# Patient Record
Sex: FEMALE | Race: WHITE | HISPANIC OR LATINO | Employment: FULL TIME | URBAN - METROPOLITAN AREA
[De-identification: names, ages, dates, MRNs, and addresses within clinical notes are randomized per-mention and may not be internally consistent; named-entity substitution may affect disease eponyms.]

---

## 2017-02-08 ENCOUNTER — ALLSCRIPTS OFFICE VISIT (OUTPATIENT)
Dept: OTHER | Facility: OTHER | Age: 27
End: 2017-02-08

## 2017-02-21 ENCOUNTER — ALLSCRIPTS OFFICE VISIT (OUTPATIENT)
Dept: OTHER | Facility: OTHER | Age: 27
End: 2017-02-21

## 2017-02-27 ENCOUNTER — GENERIC CONVERSION - ENCOUNTER (OUTPATIENT)
Dept: OTHER | Facility: OTHER | Age: 27
End: 2017-02-27

## 2017-03-01 ENCOUNTER — HOSPITAL ENCOUNTER (OUTPATIENT)
Dept: RADIOLOGY | Age: 27
Discharge: HOME | End: 2017-03-01
Admitting: FAMILY MEDICINE
Payer: COMMERCIAL

## 2017-03-01 ENCOUNTER — OFFICE VISIT (OUTPATIENT)
Dept: URGENT CARE | Age: 27
End: 2017-03-01
Payer: COMMERCIAL

## 2017-03-01 ENCOUNTER — TRANSCRIBE ORDERS (OUTPATIENT)
Dept: URGENT CARE | Age: 27
End: 2017-03-01

## 2017-03-01 DIAGNOSIS — M54.9 DORSALGIA: ICD-10-CM

## 2017-03-01 PROCEDURE — G0382 LEV 3 HOSP TYPE B ED VISIT: HCPCS

## 2017-03-01 PROCEDURE — 72100 X-RAY EXAM L-S SPINE 2/3 VWS: CPT

## 2017-03-07 RX ORDER — NORGESTIMATE AND ETHINYL ESTRADIOL 0.25-0.035
1 KIT ORAL DAILY
COMMUNITY
End: 2018-06-01 | Stop reason: SDUPTHER

## 2017-03-10 ENCOUNTER — HOSPITAL ENCOUNTER (OUTPATIENT)
Facility: HOSPITAL | Age: 27
Setting detail: OUTPATIENT SURGERY
Discharge: HOME | End: 2017-03-10
Attending: SURGERY | Admitting: SURGERY
Payer: COMMERCIAL

## 2017-03-10 ENCOUNTER — ANESTHESIA EVENT (OUTPATIENT)
Dept: PERIOP | Facility: HOSPITAL | Age: 27
End: 2017-03-10
Payer: COMMERCIAL

## 2017-03-10 ENCOUNTER — ANESTHESIA (OUTPATIENT)
Dept: PERIOP | Facility: HOSPITAL | Age: 27
End: 2017-03-10
Payer: COMMERCIAL

## 2017-03-10 VITALS
HEART RATE: 72 BPM | TEMPERATURE: 100.2 F | RESPIRATION RATE: 16 BRPM | SYSTOLIC BLOOD PRESSURE: 117 MMHG | DIASTOLIC BLOOD PRESSURE: 71 MMHG | OXYGEN SATURATION: 100 % | BODY MASS INDEX: 29.71 KG/M2 | HEIGHT: 64 IN | WEIGHT: 174 LBS

## 2017-03-10 DIAGNOSIS — K64.0 FIRST DEGREE HEMORRHOIDS: ICD-10-CM

## 2017-03-10 LAB — EXT PREGNANCY TEST URINE: NEGATIVE

## 2017-03-10 PROCEDURE — 88304 TISSUE EXAM BY PATHOLOGIST: CPT | Performed by: SURGERY

## 2017-03-10 PROCEDURE — 81025 URINE PREGNANCY TEST: CPT | Performed by: SURGERY

## 2017-03-10 RX ORDER — OXYCODONE HYDROCHLORIDE AND ACETAMINOPHEN 5; 325 MG/1; MG/1
1 TABLET ORAL EVERY 4 HOURS PRN
Status: DISCONTINUED | OUTPATIENT
Start: 2017-03-10 | End: 2017-03-10 | Stop reason: HOSPADM

## 2017-03-10 RX ORDER — PROPOFOL 10 MG/ML
INJECTION, EMULSION INTRAVENOUS AS NEEDED
Status: DISCONTINUED | OUTPATIENT
Start: 2017-03-10 | End: 2017-03-10 | Stop reason: SURG

## 2017-03-10 RX ORDER — SODIUM CHLORIDE, SODIUM LACTATE, POTASSIUM CHLORIDE, CALCIUM CHLORIDE 600; 310; 30; 20 MG/100ML; MG/100ML; MG/100ML; MG/100ML
125 INJECTION, SOLUTION INTRAVENOUS CONTINUOUS
Status: DISCONTINUED | OUTPATIENT
Start: 2017-03-10 | End: 2017-03-10 | Stop reason: HOSPADM

## 2017-03-10 RX ORDER — ONDANSETRON 2 MG/ML
4 INJECTION INTRAMUSCULAR; INTRAVENOUS ONCE
Status: DISCONTINUED | OUTPATIENT
Start: 2017-03-10 | End: 2017-03-10 | Stop reason: HOSPADM

## 2017-03-10 RX ORDER — FENTANYL CITRATE/PF 50 MCG/ML
50 SYRINGE (ML) INJECTION
Status: DISCONTINUED | OUTPATIENT
Start: 2017-03-10 | End: 2017-03-10 | Stop reason: HOSPADM

## 2017-03-10 RX ORDER — MIDAZOLAM HYDROCHLORIDE 1 MG/ML
INJECTION INTRAMUSCULAR; INTRAVENOUS AS NEEDED
Status: DISCONTINUED | OUTPATIENT
Start: 2017-03-10 | End: 2017-03-10 | Stop reason: SURG

## 2017-03-10 RX ORDER — OXYCODONE HYDROCHLORIDE AND ACETAMINOPHEN 5; 325 MG/1; MG/1
TABLET ORAL
Status: COMPLETED
Start: 2017-03-10 | End: 2017-03-10

## 2017-03-10 RX ORDER — PROMETHAZINE HYDROCHLORIDE 25 MG/ML
25 INJECTION, SOLUTION INTRAMUSCULAR; INTRAVENOUS ONCE AS NEEDED
Status: DISCONTINUED | OUTPATIENT
Start: 2017-03-10 | End: 2017-03-10 | Stop reason: HOSPADM

## 2017-03-10 RX ORDER — MEPERIDINE HYDROCHLORIDE 25 MG/ML
12.5 INJECTION INTRAMUSCULAR; INTRAVENOUS; SUBCUTANEOUS ONCE AS NEEDED
Status: DISCONTINUED | OUTPATIENT
Start: 2017-03-10 | End: 2017-03-10 | Stop reason: HOSPADM

## 2017-03-10 RX ORDER — FENTANYL CITRATE 50 UG/ML
INJECTION, SOLUTION INTRAMUSCULAR; INTRAVENOUS AS NEEDED
Status: DISCONTINUED | OUTPATIENT
Start: 2017-03-10 | End: 2017-03-10 | Stop reason: SURG

## 2017-03-10 RX ORDER — SODIUM CHLORIDE, SODIUM LACTATE, POTASSIUM CHLORIDE, CALCIUM CHLORIDE 600; 310; 30; 20 MG/100ML; MG/100ML; MG/100ML; MG/100ML
125 INJECTION, SOLUTION INTRAVENOUS CONTINUOUS
Status: DISCONTINUED | OUTPATIENT
Start: 2017-03-10 | End: 2017-03-10

## 2017-03-10 RX ORDER — PROPOFOL 10 MG/ML
INJECTION, EMULSION INTRAVENOUS CONTINUOUS PRN
Status: DISCONTINUED | OUTPATIENT
Start: 2017-03-10 | End: 2017-03-10 | Stop reason: SURG

## 2017-03-10 RX ORDER — LIDOCAINE HYDROCHLORIDE 10 MG/ML
INJECTION, SOLUTION INFILTRATION; PERINEURAL AS NEEDED
Status: DISCONTINUED | OUTPATIENT
Start: 2017-03-10 | End: 2017-03-10 | Stop reason: SURG

## 2017-03-10 RX ADMIN — SODIUM CHLORIDE, SODIUM LACTATE, POTASSIUM CHLORIDE, AND CALCIUM CHLORIDE 125 ML/HR: .6; .31; .03; .02 INJECTION, SOLUTION INTRAVENOUS at 13:16

## 2017-03-10 RX ADMIN — FENTANYL CITRATE 25 MCG: 50 INJECTION, SOLUTION INTRAMUSCULAR; INTRAVENOUS at 14:41

## 2017-03-10 RX ADMIN — FENTANYL CITRATE 25 MCG: 50 INJECTION, SOLUTION INTRAMUSCULAR; INTRAVENOUS at 14:32

## 2017-03-10 RX ADMIN — LIDOCAINE HYDROCHLORIDE 50 MG: 10 INJECTION, SOLUTION INFILTRATION; PERINEURAL at 14:31

## 2017-03-10 RX ADMIN — PROPOFOL 70 MG: 10 INJECTION, EMULSION INTRAVENOUS at 14:31

## 2017-03-10 RX ADMIN — FENTANYL CITRATE 25 MCG: 50 INJECTION, SOLUTION INTRAMUSCULAR; INTRAVENOUS at 14:55

## 2017-03-10 RX ADMIN — FENTANYL CITRATE 25 MCG: 50 INJECTION, SOLUTION INTRAMUSCULAR; INTRAVENOUS at 14:36

## 2017-03-10 RX ADMIN — PROPOFOL 100 MCG/KG/MIN: 10 INJECTION, EMULSION INTRAVENOUS at 14:31

## 2017-03-10 RX ADMIN — OXYCODONE HYDROCHLORIDE AND ACETAMINOPHEN 1 TABLET: 5; 325 TABLET ORAL at 16:32

## 2017-03-10 RX ADMIN — MIDAZOLAM HYDROCHLORIDE 2 MG: 1 INJECTION, SOLUTION INTRAMUSCULAR; INTRAVENOUS at 14:22

## 2017-06-15 ENCOUNTER — ALLSCRIPTS OFFICE VISIT (OUTPATIENT)
Dept: OTHER | Facility: OTHER | Age: 27
End: 2017-06-15

## 2017-06-15 DIAGNOSIS — M54.50 LOW BACK PAIN: ICD-10-CM

## 2017-09-12 ENCOUNTER — HOSPITAL ENCOUNTER (EMERGENCY)
Facility: HOSPITAL | Age: 27
Discharge: HOME | End: 2017-09-12
Attending: EMERGENCY MEDICINE | Admitting: EMERGENCY MEDICINE
Payer: COMMERCIAL

## 2017-09-12 VITALS
TEMPERATURE: 99 F | DIASTOLIC BLOOD PRESSURE: 65 MMHG | WEIGHT: 173 LBS | BODY MASS INDEX: 29.7 KG/M2 | HEART RATE: 83 BPM | SYSTOLIC BLOOD PRESSURE: 134 MMHG | RESPIRATION RATE: 18 BRPM | OXYGEN SATURATION: 100 %

## 2017-09-12 DIAGNOSIS — Z20.2 EXPOSURE TO STD: Primary | ICD-10-CM

## 2017-09-12 PROCEDURE — 99283 EMERGENCY DEPT VISIT LOW MDM: CPT

## 2017-09-12 PROCEDURE — 96372 THER/PROPH/DIAG INJ SC/IM: CPT

## 2017-09-12 PROCEDURE — 87591 N.GONORRHOEAE DNA AMP PROB: CPT | Performed by: EMERGENCY MEDICINE

## 2017-09-12 PROCEDURE — 87491 CHLMYD TRACH DNA AMP PROBE: CPT | Performed by: EMERGENCY MEDICINE

## 2017-09-12 RX ORDER — AZITHROMYCIN 250 MG/1
1000 TABLET, FILM COATED ORAL ONCE
Status: COMPLETED | OUTPATIENT
Start: 2017-09-12 | End: 2017-09-12

## 2017-09-12 RX ADMIN — LIDOCAINE HYDROCHLORIDE 250 MG: 10 INJECTION, SOLUTION EPIDURAL; INFILTRATION; INTRACAUDAL; PERINEURAL at 18:28

## 2017-09-12 RX ADMIN — AZITHROMYCIN 1000 MG: 250 TABLET, FILM COATED ORAL at 18:27

## 2017-09-13 LAB
CHLAMYDIA DNA CVX QL NAA+PROBE: ABNORMAL
N GONORRHOEA DNA GENITAL QL NAA+PROBE: ABNORMAL

## 2017-09-15 ENCOUNTER — TRANSCRIBE ORDERS (OUTPATIENT)
Dept: LAB | Facility: CLINIC | Age: 27
End: 2017-09-15

## 2017-09-15 ENCOUNTER — APPOINTMENT (OUTPATIENT)
Dept: LAB | Facility: CLINIC | Age: 27
End: 2017-09-15
Payer: COMMERCIAL

## 2017-09-15 ENCOUNTER — GENERIC CONVERSION - ENCOUNTER (OUTPATIENT)
Dept: OTHER | Facility: OTHER | Age: 27
End: 2017-09-15

## 2017-09-15 DIAGNOSIS — Z20.2 CONTACT WITH AND (SUSPECTED) EXPOSURE TO INFECTIONS WITH A PREDOMINANTLY SEXUAL MODE OF TRANSMISSION: ICD-10-CM

## 2017-09-15 PROCEDURE — 87340 HEPATITIS B SURFACE AG IA: CPT

## 2017-09-15 PROCEDURE — 86592 SYPHILIS TEST NON-TREP QUAL: CPT

## 2017-09-15 PROCEDURE — 36415 COLL VENOUS BLD VENIPUNCTURE: CPT

## 2017-09-15 PROCEDURE — 87389 HIV-1 AG W/HIV-1&-2 AB AG IA: CPT

## 2017-09-16 LAB — HBV SURFACE AG SER QL: NORMAL

## 2017-09-18 LAB
HIV 1+2 AB+HIV1 P24 AG SERPL QL IA: NORMAL
RPR SER QL: NORMAL

## 2018-01-13 VITALS
HEART RATE: 62 BPM | WEIGHT: 175.04 LBS | SYSTOLIC BLOOD PRESSURE: 106 MMHG | HEIGHT: 63 IN | TEMPERATURE: 98.1 F | DIASTOLIC BLOOD PRESSURE: 70 MMHG | BODY MASS INDEX: 31.02 KG/M2

## 2018-01-14 VITALS — BODY MASS INDEX: 31.54 KG/M2 | WEIGHT: 178 LBS | HEIGHT: 63 IN

## 2018-01-18 NOTE — MISCELLANEOUS
Message  Return to work or school:   Chrissy Saenz is under my professional care  She was seen in my office on 6/8/16   She is able to return to work on  6/9/16            Future Appointments    Signatures   Electronically signed by : APURVA Iqbal; Jun 8 2016  7:25PM EST                       (Author)    Electronically signed by :  APURVA Iqbal; Jun 8 2016  7:26PM EST                       (Author)

## 2018-01-18 NOTE — PROGRESS NOTES
Assessment   1  History of headache (V13 89) (Z87 898)  2  Headache (784 0) (R51)    Plan  Headache    · Start: Ondansetron 4 MG Oral Tablet Dispersible; TAKE 4 MG Every 8 hours   · Administered: Ketorolac Tromethamine 60 MG/2ML Injection Solution    Discussion/Summary  Discussion Summary:   Follow up with PCP   take meds as discussed   only use tylenol tonight and if symptoms worsen go to ER  use sinus medications as discussed  after medication pt felt better and ambulated out of office  Medication Side Effects Reviewed: Possible side effects of new medications were reviewed with the patient/guardian today  Understands and agrees with treatment plan: The treatment plan was reviewed with the patient/guardian  The patient/guardian understands and agrees with the treatment plan   Counseling Documentation With Imm: The patient was counseled regarding instructions for management, patient and family education, importance of compliance with treatment  Follow Up Instructions: Follow Up with your Primary Care Provider in 1-2 days  If your symptoms worsen, go to the nearest [facility]  Chief Complaint  Chief Complaint Free Text Note Form: c/o headache, vomiting, dizziness and itchy throat since AM,worsening through out the day  pt  took aleve @0700 and 1100 without relief      History of Present Illness  HPI: Pt has HA for the past one day, taken OTC advil, last had a headache last this about 2 years ago  Today she is nauseated and is sensitive to light, no vomiting, dizziness intermittent  She has hx of migraines and feels like this in nature  pt drove to office today, will give medications and have her wait a while to see if symptoms improve  She has no loss of balance, no worse pain with sound and no sinus symptoms  takes antihistamine daily for allergies  Hospital Based Practices Required Assessment:   Abuse And Domestic Violence Screen    Yes, the patient is safe at home   The patient states no one is hurting them  Depression And Suicide Screen  No, the patient has not had thoughts of hurting themself  No, the patient has not felt depressed in the past 7 days  Review of Systems  Focused-Female:   Constitutional: as noted in HPI    ENT: no ear ache, no loss of hearing, no nosebleeds or nasal discharge, no sore throat or hoarseness  Cardiovascular: no complaints of slow or fast heart rate, no chest pain, no palpitations, no leg claudication or lower extremity edema  Respiratory: no complaints of shortness of breath, no wheezing, no dyspnea on exertion, no orthopnea or PND  Gastrointestinal: as noted in HPI  Genitourinary: no complaints of dysuria, no incontinence, no pelvic pain, no dysmenorrhea, no vaginal discharge or abnormal vaginal bleeding  Musculoskeletal: no complaints of arthralgia, no myalgia, no joint swelling or stiffness, no limb pain or swelling  Integumentary: no complaints of skin rash or lesion, no itching or dry skin, no skin wounds  Neurological: as noted in HPI  ROS Reviewed:   ROS reviewed  Active Problems   1  Acne (706 1) (L70 9)  2  Edema of lower extremity (782 3) (R60 0)  3  Encounter for routine gynecological examination with Papanicolaou smear of cervix   (V72 31,V76 2) (Z01 419)  4  Left cervical radiculopathy (723 4) (M54 12)  5  Neck pain (723 1) (M54 2)  6  Obesity (278 00) (E66 9)  7  Oral contraceptive prescribed (V25 01) (Z30 011)  8  Seasonal allergies (477 9) (J30 2)  9  Trapezius muscle spasm (728 85) (L43 563)    Past Medical History   1  History of Acanthosis nigricans (701 2) (L83)  2  History of Age At First Period 5 Years Old (Menarche)  3  History of Cough (786 2) (R05)  4  History of Dermatitis, vesicular (692 9) (L30 8)  5  History of Finger injury (959 5) (S69 90XA)  6  History of Hematochezia (578 1) (K92 1)  7  History of amenorrhea (V13 29) (Z87 42)  8  History of anemia (V12 3) (Z86 2)  9   History of constipation (V12 79) (Z87 19)  10  History of fatigue (V13 89) (Z87 898)  11  History of headache (V13 89) (Z87 898)  12  History of hyperlipidemia (V12 29) (Z86 39)  13  History of menorrhagia (V13 29) (Z87 42)  14  History of migraine (V12 49) (Z86 69)  15  History of nausea and vomiting (V12 79) (Z87 898)  16  History of pregnancy (V13 29)  17  History of seasonal allergies (V15 09) (Z88 9)  18  History of viral infection (V12 09) (Z86 19)  19  History of Irregular menses (626 4) (N92 6)  20  History of Morbid or severe obesity due to excess calories (278 01) (E66 01)  21  History of Murmur (785 2) (R01 1)  22  History of Neck pain (723 1) (M54 2)  23  Normal delivery (650) (O80,Z37 9)  24  History of Reported A Previous Heart Murmur  25  History of Stress incontinence, female (625 6) (N39 3)  26  History of Symptoms of urinary tract infection (788 99) (R39 9)  27  History of Tension-type headache, not intractable, unspecified chronicity pattern (339 10)    (G44 209)  28  History of Thumb tendonitis (727 05) (M77 8)  29  History of Vaginal itching (698 1) (L29 8)  30  History of Wrist pain, acute, left (719 43) (M25 532)  31  History of Wrist pain, chronic, left (133 66,159 18) (M25 532,G89 29)  Active Problems And Past Medical History Reviewed: The active problems and past medical history were reviewed and updated today  Family History  Mother   1  Family history of anemia (V18 2) (Z83 2)  2  Family history of hypertension (V17 49) (Z82 49)  Brother   3  Family history of Down Syndrome Translocation  Maternal Grandmother   4  Family history of Diabetes Mellitus (V18 0)  Paternal Grandmother   11  Family history of Alzheimer's disease (V17 2) (Z82 0)  Family History Reviewed: The family history was reviewed and updated today         Social History    · Denied: History of Drug Use   · Exercises regularly   · Four children   · Housewife or homemaker   ·    · Never A Smoker   · Never smoker   · No caffeine use   · No drug use   · Social alcohol use  Social History Reviewed: The social history was reviewed and updated today  The social history was reviewed and is unchanged  Surgical History   1  History of Tubal Ligation  Surgical History Reviewed: The surgical history was reviewed and updated today  Current Meds  1  Cyclobenzaprine HCl - 10 MG Oral Tablet; TAKE 1 TABLET 3 TIMES DAILY AS NEEDED; Therapy: 73YUH3977 to (Cynthia White)  Requested for: 76YVC1984; Last   Rx:[id number] Ordered  2  PredniSONE 10 MG Oral Tablet; TAKE 3 TABLETS FOR 3 DAYS, 2 TABLETS FOR 3 DAYS,   1 TABLET FOR 4 DAYS AND THEN STOP; Therapy: 09QVV8082 to (Evaluate:04Jun2016)  Requested for: 58KZY7887; Last   Rx:[id number] Ordered  3  Sprintec 28 0 25-35 MG-MCG Oral Tablet; TAKE 1 TABLET BY MOUTH ONCE DAILY; Therapy: 71LFQ2881 to (Evaluate:05Jan2017)  Requested for: 47TTE5846; Last   Rx:[id number] Ordered  Medication List Reviewed: The medication list was reviewed and updated today  Allergies   1  No Known Drug Allergies   2  No Known Food Allergies    Vitals  Signs [Data Includes: Current Encounter]   Recorded: 93CCN2061 02:28PM   Temperature: 97 9 F, Oral  Heart Rate: 81  Respiration: 18  Systolic: 477  Diastolic: 78  Height: 5 ft 4 in  Weight: 198 lb   BMI Calculated: 33 99  BSA Calculated: 1 95  O2 Saturation: 98  LMP: 87Ttg6932  Pain Scale: 8    Physical Exam    Constitutional   General appearance: Abnormal   pt sitting in dim lit room due to symptoms improved when doing so  Eyes   Conjunctiva and lids: No swelling, erythema or discharge  Pupils and irises: Equal, round and reactive to light  Ears, Nose, Mouth, and Throat   External inspection of ears and nose: Normal     Otoscopic examination: Tympanic membranes translucent with normal light reflex  Canals patent without erythema  Nasal mucosa, septum, and turbinates: Normal without edema or erythema      Oropharynx: Abnormal   clear post nasal drip noted  Pulmonary   Respiratory effort: No increased work of breathing or signs of respiratory distress  Auscultation of lungs: Clear to auscultation  Cardiovascular   Auscultation of heart: Normal rate and rhythm, normal S1 and S2, without murmurs  Abdomen   Abdomen: Abnormal   nausea  Lymphatic   Palpation of lymph nodes in neck: Abnormal   large nodes found in neck, spoke to pt about thyroid and to follow up with PCP  Musculoskeletal   Gait and station: Normal     Skin   Skin and subcutaneous tissue: Normal without rashes or lesions  Neurologic   Reflexes: 2+ and symmetric  Sensation: No sensory loss  Psychiatric   Orientation to person, place, and time: Normal     Mood and affect: Normal        Message  Return to work or school:   Mookie Vargas is under my professional care  She was seen in my office on 6/8/16   She is able to return to work on  6/9/16            Future Appointments    Date/Time Provider Specialty Site   06/22/2016 03:05 PM Specialty Clinic, Dermatology  [facility]     Signatures   Electronically signed by :  APURVA Alonso; Jun 8 2016  7:25PM EST                       (Author)    Electronically signed by : Leo Painter DO; Jun 9 2016  7:22AM EST                       (Co-author)

## 2018-01-22 VITALS
SYSTOLIC BLOOD PRESSURE: 121 MMHG | BODY MASS INDEX: 30.7 KG/M2 | DIASTOLIC BLOOD PRESSURE: 80 MMHG | WEIGHT: 173.28 LBS | HEART RATE: 73 BPM | HEIGHT: 63 IN

## 2018-06-01 ENCOUNTER — OFFICE VISIT (OUTPATIENT)
Dept: OBGYN CLINIC | Facility: CLINIC | Age: 28
End: 2018-06-01
Payer: COMMERCIAL

## 2018-06-01 ENCOUNTER — TRANSCRIBE ORDERS (OUTPATIENT)
Dept: LAB | Facility: CLINIC | Age: 28
End: 2018-06-01

## 2018-06-01 ENCOUNTER — APPOINTMENT (OUTPATIENT)
Dept: LAB | Facility: CLINIC | Age: 28
End: 2018-06-01
Payer: COMMERCIAL

## 2018-06-01 VITALS
DIASTOLIC BLOOD PRESSURE: 85 MMHG | HEIGHT: 64 IN | BODY MASS INDEX: 30.19 KG/M2 | HEART RATE: 88 BPM | SYSTOLIC BLOOD PRESSURE: 121 MMHG | WEIGHT: 176.8 LBS

## 2018-06-01 DIAGNOSIS — Z72.51 HIGH RISK SEXUAL BEHAVIOR: ICD-10-CM

## 2018-06-01 DIAGNOSIS — Z01.419 ENCOUNTER FOR GYNECOLOGICAL EXAMINATION WITHOUT ABNORMAL FINDING: Primary | ICD-10-CM

## 2018-06-01 DIAGNOSIS — N92.0 MENORRHAGIA WITH REGULAR CYCLE: ICD-10-CM

## 2018-06-01 PROCEDURE — 36415 COLL VENOUS BLD VENIPUNCTURE: CPT

## 2018-06-01 PROCEDURE — 87591 N.GONORRHOEAE DNA AMP PROB: CPT | Performed by: NURSE PRACTITIONER

## 2018-06-01 PROCEDURE — 86592 SYPHILIS TEST NON-TREP QUAL: CPT

## 2018-06-01 PROCEDURE — 87491 CHLMYD TRACH DNA AMP PROBE: CPT | Performed by: NURSE PRACTITIONER

## 2018-06-01 PROCEDURE — 87340 HEPATITIS B SURFACE AG IA: CPT

## 2018-06-01 PROCEDURE — 99395 PREV VISIT EST AGE 18-39: CPT | Performed by: NURSE PRACTITIONER

## 2018-06-01 PROCEDURE — 87389 HIV-1 AG W/HIV-1&-2 AB AG IA: CPT

## 2018-06-01 RX ORDER — NORGESTIMATE AND ETHINYL ESTRADIOL 0.25-0.035
1 KIT ORAL DAILY
Qty: 28 TABLET | Refills: 11 | Status: SHIPPED | OUTPATIENT
Start: 2018-06-01 | End: 2019-06-14 | Stop reason: SDUPTHER

## 2018-06-01 RX ORDER — NORGESTIMATE AND ETHINYL ESTRADIOL 0.25-0.035
1 KIT ORAL DAILY
COMMUNITY
Start: 2011-06-08 | End: 2018-06-01

## 2018-06-01 RX ORDER — NORGESTIMATE AND ETHINYL ESTRADIOL 0.25-0.035
1 KIT ORAL DAILY
Qty: 28 TABLET | Refills: 11 | Status: SHIPPED | OUTPATIENT
Start: 2018-06-01 | End: 2018-06-01

## 2018-06-01 NOTE — PROGRESS NOTES
Diagnoses and all orders for this visit:    Encounter for gynecological examination without abnormal finding    Menorrhagia with regular cycle  -     Discontinue: norgestimate-ethinyl estradiol ([facility] 28) 0 25-35 MG-MCG per tablet; Take 1 tablet by mouth daily  -     norgestimate-ethinyl estradiol (ORTHO-CYCLEN) 0 25-35 MG-MCG per tablet; Take 1 tablet by mouth daily    High risk sexual behavior  -     Hepatitis B surface antigen; Future  -     HIV 1/2 AG-AB combo; Future  -     RPR; Future  -     Chlamydia/GC amplified DNA by PCR    Other orders  -     Discontinue: norgestimate-ethinyl estradiol (SPRINTEC 28) 0 25-35 MG-MCG per tablet; Take 1 tablet by mouth daily                ASSESSMENT & PLAN: Gallo Rosales is a 33 y o  No obstetric history on file  with normal gynecologic exam     1   Routine well woman exam done today  2  Pap:  The patient's Pap was not done today  Current ASCCP Guidelines reviewed  Patient's last Pap wa done 2016 and was negative, next Pap is due in  Current ASCCP Guidelines reviewed  3   STD testing  was done patient desires testing  4  The following were reviewed in today's visit: breast self exam, STD testing, use and side effects of OCPs, adequate intake of calcium and vitamin D, exercise and healthy diet  5  Desires to stay on Sprintec to regulate her menses, history of menorrhagia-irregular menses after her tubal   One pack Sprintec with 11 refill sent to pharmacy  CC:  Annual Gynecologic Examination    HPI: Gallo Rosales is a 33 y o   who presents for annual gynecologic examination     History of a tubal ligation  After her tubal her periods were irregular and were heavy and will last about 12 days  So she was started on birth control pills and that has helped her periods, currently they last about 4 days and are not heavy  She does not miss any pills  She desires to stay on Sprintec  She denies any ACHES sx with pill use    She desires STD testing  Health Maintenance:    She wears her seatbelt routinely  She does perform regular monthly self breast exams  She feels safe at home  Past Medical History:   Diagnosis Date    Heart murmur     Hemorrhoids     Migraine     Seasonal allergies        Past Surgical History:   Procedure Laterality Date    ABDOMINOPLASTY      [place] HEMORRHOIDECTOMY,INT/EXT, 2+ COLUMNS/GROUPS N/A 3/10/2017    Procedure: HEMORRHOIDECTOMY ;  Surgeon: Fermín Saucedo MD;  [facility]: [facility];  Service: Colorectal    TUBAL LIGATION         OB/Gyn History:    Pt does not have menstrual issues  History of sexually transmitted infection: Yes  Chlamydia  History of abnormal pap smears: Yes , states a long time ago and follow-ups have always been negative  Patient is currently sexually active  The current method of family planning is tubal ligation and OCP (estrogen/progesterone)  OB History     No data available          No family history on file  Social History:  Social History     Social History    Marital status: /Civil Union     Spouse name: N/A    Number of children: N/A    Years of education: N/A     Occupational History    Not on file       Social History Main Topics    Smoking status: Never Smoker    Smokeless tobacco: Never Used    Alcohol use Yes      Comment: weekends     Drug use: No    Sexual activity: Yes     Partners: Male     Birth control/ protection: OCP     Other Topics Concern    Not on file     Social History Narrative    No narrative on file         Allergies   Allergen Reactions    Pollen Extract          Current Outpatient Prescriptions:     norgestimate-ethinyl estradiol (ORTHO-CYCLEN) 0 25-35 MG-MCG per tablet, Take 1 tablet by mouth daily, Disp: 28 tablet, Rfl: 11    Review of Systems:  Constitutional :no fever, feels well, no tiredness, no recent weight gain or loss  ENT: no ear ache, no loss of hearing, no nosebleeds or nasal discharge, no sore throat or hoarseness  Cardiovascular: no complaints of slow or fast heart beat, no chest pain, no palpitations, no leg claudication or lower extremity edema  Respiratory: no complaints of shortness of shortness of breath, no HARLEY  Breasts:no complaints of breast pain, breast lump, or nipple discharge  Gastrointestinal: no complaints of abdominal pain, constipation, nausea, vomiting, or diarrhea or bloody stools  Genitourinary : no complaints of dysuria, incontinence, pelvic pain, no dysmenorrhea, vaginal discharge or abnormal vaginal bleeding and as noted in HPI  Musculoskeletal: no complaints of arthralgia, no myalgia, no joint swelling or stiffness, no limb pain or swelling  Integumentary: no complaints of skin rash or lesion, itching or dry skin  Neurological: no complaints of headache, no confusion, no numbness or tingling, no dizziness or fainting    Objective      /85 (BP Location: Left arm, Patient Position: Sitting, Cuff Size: Adult)   Pulse 88   Ht 5' 4" (1 626 m)   Wt 80 2 kg (176 lb 12 8 oz)   LMP 05/03/2018   Breastfeeding? No   BMI 30 35 kg/m²     General:   appears stated age, cooperative, alert normal mood and affect   Neck: normal, supple,trachea midline, no masses   Heart: regular rate and rhythm, S1, S2 normal, no murmur, click, rub or gallop   Lungs: clear to auscultation bilaterally   Breasts: normal appearance, no masses or tenderness, Inspection negative   Abdomen: soft, non-tender, without masses or organomegaly   Vulva: normal female genitalia, Bartholin's, Urethra, Idana normal   Vagina: normal vagina, no discharge, exudate, lesion, or erythema   Urethra: normal   Cervix: Normal, no discharge  Nontender  No lesions   Uterus: normal size, contour, position, consistency, mobility, non-tender and anteverted, NT   Adnexa: normal adnexa, NT   Lymphatic palpation of lymph nodes in neck, axilla, groin and/or other locations: no lymphadenopathy or masses noted   Skin normal skin turgor and no jaclyn     Psychiatric orientation to person, place, and time: normal  mood and affect: normal

## 2018-06-02 LAB — HBV SURFACE AG SER QL: NORMAL

## 2018-06-04 LAB
HIV 1+2 AB+HIV1 P24 AG SERPL QL IA: NORMAL
RPR SER QL: NORMAL

## 2018-06-05 LAB
CHLAMYDIA DNA CVX QL NAA+PROBE: NORMAL
N GONORRHOEA DNA GENITAL QL NAA+PROBE: NORMAL

## 2018-07-14 ENCOUNTER — HOSPITAL ENCOUNTER (EMERGENCY)
Facility: HOSPITAL | Age: 28
Discharge: HOME | End: 2018-07-14
Attending: EMERGENCY MEDICINE
Payer: COMMERCIAL

## 2018-07-14 VITALS
RESPIRATION RATE: 18 BRPM | TEMPERATURE: 98.2 F | OXYGEN SATURATION: 98 % | SYSTOLIC BLOOD PRESSURE: 145 MMHG | DIASTOLIC BLOOD PRESSURE: 82 MMHG | HEART RATE: 82 BPM

## 2018-07-14 DIAGNOSIS — N89.8 VAGINAL DISCHARGE: Primary | ICD-10-CM

## 2018-07-14 DIAGNOSIS — Z20.2 POSSIBLE EXPOSURE TO STD: ICD-10-CM

## 2018-07-14 LAB
BILIRUB UR QL STRIP: NEGATIVE
CLARITY UR: CLEAR
COLOR UR: YELLOW
EXT PREG TEST URINE: NEGATIVE
GLUCOSE UR STRIP-MCNC: NEGATIVE MG/DL
HGB UR QL STRIP.AUTO: NEGATIVE
KETONES UR STRIP-MCNC: NEGATIVE MG/DL
LEUKOCYTE ESTERASE UR QL STRIP: NEGATIVE
NITRITE UR QL STRIP: NEGATIVE
PH UR STRIP.AUTO: 6 [PH] (ref 4.5–8)
PROT UR STRIP-MCNC: NEGATIVE MG/DL
SP GR UR STRIP.AUTO: 1.02 (ref 1–1.03)
UROBILINOGEN UR QL STRIP.AUTO: 0.2 E.U./DL

## 2018-07-14 PROCEDURE — 87491 CHLMYD TRACH DNA AMP PROBE: CPT | Performed by: EMERGENCY MEDICINE

## 2018-07-14 PROCEDURE — 81025 URINE PREGNANCY TEST: CPT | Performed by: EMERGENCY MEDICINE

## 2018-07-14 PROCEDURE — 87591 N.GONORRHOEAE DNA AMP PROB: CPT | Performed by: EMERGENCY MEDICINE

## 2018-07-14 PROCEDURE — 99283 EMERGENCY DEPT VISIT LOW MDM: CPT

## 2018-07-14 PROCEDURE — 96372 THER/PROPH/DIAG INJ SC/IM: CPT

## 2018-07-14 PROCEDURE — 81003 URINALYSIS AUTO W/O SCOPE: CPT

## 2018-07-14 RX ORDER — AZITHROMYCIN 250 MG/1
1000 TABLET, FILM COATED ORAL ONCE
Status: COMPLETED | OUTPATIENT
Start: 2018-07-14 | End: 2018-07-14

## 2018-07-14 RX ADMIN — AZITHROMYCIN 1000 MG: 250 TABLET, FILM COATED ORAL at 16:20

## 2018-07-14 RX ADMIN — LIDOCAINE HYDROCHLORIDE 250 MG: 10 INJECTION, SOLUTION EPIDURAL; INFILTRATION; INTRACAUDAL; PERINEURAL at 16:20

## 2018-07-14 NOTE — ED PROVIDER NOTES
History  Chief Complaint   Patient presents with    Vaginal Itching     pt reports having unprotected sex with new partner on Wednesday and Thursday started with vaginal itching and pain with urination, denies discharge     21-year-old female presents today complaining of vaginal discharge and dysuria which started 2 days ago  She admits to having unprotected sex with a new partner on Wednesday in the symptoms started Thursday  History provided by:  Patient  Vaginal Itching   Severity:  Moderate  Onset quality:  Sudden  Duration:  2 days  Timing:  Constant  Progression:  Unchanged  Chronicity:  New  Context:  See HPI  Associated symptoms: no abdominal pain, no congestion, no fever, no nausea and no rash        Prior to Admission Medications   Prescriptions Last Dose Informant Patient Reported? Taking?   norgestimate-ethinyl estradiol (ORTHO-CYCLEN) 0 25-35 MG-MCG per tablet   No No   Sig: Take 1 tablet by mouth daily      Facility-Administered Medications: None       Past Medical History:   Diagnosis Date    Heart murmur     Hemorrhoids     Migraine     Seasonal allergies        Past Surgical History:   Procedure Laterality Date    ABDOMINOPLASTY      [place] HEMORRHOIDECTOMY,INT/EXT, 2+ COLUMNS/GROUPS N/A 3/10/2017    Procedure: HEMORRHOIDECTOMY ;  Surgeon: Adam Louise MD;  [facility]: [facility];  Service: Colorectal    TUBAL LIGATION         History reviewed  No pertinent family history  I have reviewed and agree with the history as documented  Social History   Substance Use Topics    Smoking status: Never Smoker    Smokeless tobacco: Never Used    Alcohol use Yes      Comment: socially        Review of Systems   Constitutional: Negative for chills and fever  HENT: Negative for congestion  Gastrointestinal: Negative for abdominal pain and nausea  Genitourinary: Positive for dysuria and vaginal discharge  Negative for difficulty urinating and pelvic pain     Musculoskeletal: Negative for back pain  Skin: Negative for pallor, rash and wound  Psychiatric/Behavioral: Negative for confusion  Physical Exam  Physical Exam   Constitutional: She is oriented to person, place, and time  She appears well-developed and well-nourished  HENT:   Head: Normocephalic and atraumatic  Eyes: EOM are normal  Pupils are equal, round, and reactive to light  Neck: Normal range of motion  Neck supple  Cardiovascular: Normal rate  Pulmonary/Chest: Effort normal    Abdominal: Soft  Bowel sounds are normal  There is no tenderness  Genitourinary: Vagina normal and uterus normal  Pelvic exam was performed with patient prone  There is no rash, tenderness or lesion on the right labia  There is no rash, tenderness or lesion on the left labia  Uterus is not enlarged and not tender  Cervix exhibits discharge (Thick, white)  Cervix exhibits no motion tenderness and no friability  Right adnexum displays no mass, no tenderness and no fullness  Left adnexum displays no mass and no tenderness  Musculoskeletal: Normal range of motion  Neurological: She is alert and oriented to person, place, and time  Skin: Skin is warm  Capillary refill takes less than 2 seconds  No rash noted  Psychiatric: She has a normal mood and affect  Nursing note and vitals reviewed        Vital Signs  ED Triage Vitals [07/14/18 1444]   Temperature Pulse Respirations Blood Pressure SpO2   98 2 °F (36 8 °C) 82 18 145/82 98 %      Temp Source Heart Rate Source Patient Position - Orthostatic VS BP Location FiO2 (%)   Oral Monitor Sitting Left arm --      Pain Score       No Pain           Vitals:    07/14/18 1444   BP: 145/82   Pulse: 82   Patient Position - Orthostatic VS: Sitting       Visual Acuity      ED Medications  Medications   cefTRIAXone (ROCEPHIN) 250 mg in lidocaine (PF) (XYLOCAINE-MPF) 1 % IM only syringe (not administered)   azithromycin (ZITHROMAX) tablet 1,000 mg (not administered)       Diagnostic Studies  Results Reviewed     Procedure Component Value Units Date/Time    Chlamydia/GC amplified DNA by PCR [[id number]] Collected:  07/14/18 1605    Lab Status: In process Specimen:  Genital from Cervix Updated:  07/14/18 1607    POCT pregnancy, urine [[id number]]  (Normal) Resulted:  07/14/18 1531    Lab Status:  Final result Updated:  07/14/18 1531     EXT PREG TEST UR (Ref: Negative) NEGATIVE    ED Urine Macroscopic [[id number]] Collected:  07/14/18 1521    Lab Status:  Final result Specimen:  Urine Updated:  07/14/18 1514     Color, UA Yellow     Clarity, UA Clear     pH, UA 6 0     Leukocytes, UA Negative     Nitrite, UA Negative     Protein, UA Negative mg/dl      Glucose, UA Negative mg/dl      Ketones, UA Negative mg/dl      Urobilinogen, UA 0 2 E U /dl      Bilirubin, UA Negative     Blood, UA Negative     Specific Gravity, UA 1 025    Narrative:       CLINITEK RESULT                 No orders to display              Procedures  Procedures       Phone Contacts  ED Phone Contact    ED Course                               MDM  Number of Diagnoses or Management Options  Possible exposure to STD: new and requires workup  Vaginal discharge: new and requires workup  Diagnosis management comments: Vaginal exam shows thick white discharge  Patient does admit to unprotected sex with a new partner  Will treat with Zithromax and Rocephin  Patient instructed that someone will call with results of her  vaginal cultures  Patient is stable for discharge         Amount and/or Complexity of Data Reviewed  Clinical lab tests: ordered    Risk of Complications, Morbidity, and/or Mortality  Presenting problems: moderate  Diagnostic procedures: moderate  Management options: moderate    Patient Progress  Patient progress: stable    CritCare Time    Disposition  Final diagnoses:   Vaginal discharge   Possible exposure to STD     Time reflects when diagnosis was documented in both MDM as applicable and the Disposition within this note     Time User Action Codes Description Comment    7/14/2018  4:09 PM Alex Balnco Add [N89 8] Vaginal discharge     7/14/2018  4:10 PM Alex Blanco Add [Z20 2] Possible exposure to STD       ED Disposition     ED Disposition Condition Comment    Discharge  Earl Barnes 31 discharge to home/self care  Condition at discharge: Stable        Follow-up Information     Follow up With Specialties Details Why Contact Info    Nathaniel Hunter MD Internal Medicine Schedule an appointment as soon as possible for a visit  [address]  [phone]            Patient's Medications   Discharge Prescriptions    No medications on file     No discharge procedures on file      ED Provider  Electronically Signed by           Reggie Spencer DO  07/14/18 0012

## 2018-07-14 NOTE — DISCHARGE INSTRUCTIONS
Vaginal Discharge   WHAT YOU NEED TO KNOW:   Vaginal discharge is normal  It is usually clear or white and odorless  A change in the amount, smell, or color may indicate an underlying problem  Irritation, itching, or burning may also be a sign of a problem  Infection, a foreign body, or chemical irritants can cause abnormal vaginal discharge  Birth control pills, creams, or jellies may also cause abnormal vaginal discharge  DISCHARGE INSTRUCTIONS:   Medicines:   · Medicines  may help fight a fungal or bacterial infection  The medicine may be given as a pill  You may instead get a cream or gel you insert into your vagina  · Take your medicine as directed  Contact your healthcare provider if you think your medicine is not helping or if you have side effects  Tell him of her if you are allergic to any medicine  Keep a list of the medicines, vitamins, and herbs you take  Include the amounts, and when and why you take them  Bring the list or the pill bottles to follow-up visits  Carry your medicine list with you in case of an emergency  Contact your healthcare provider or gynecologist if:   · Your symptoms do not get better in 3 to 5 days  · You have trouble urinating, or you urinate often and with urgency  · You have abdominal pain or cramps  · Your discharge is bloody and it is not your monthly period  · You have pain with sexual intercourse  · You have a fever or chills  · You have low back pain or side pain  · You have questions or concerns about your condition or care  Self-care:   · Clean in and around your vagina with mild soap and warm water each day  Gently dry the area after washing  · Take a sitz bath  Fill a bathtub with 4 to 6 inches of warm water  You may also use a sitz bath pan that fits over a toilet  Sit in the sitz bath for 20 minutes  Do this 2 to 3 times a day, or as directed  The warm water can help decrease pain and swelling       · Do not wear tight-fitting clothes or undergarments  These can make your symptoms worse  · Ask about douching  Douching may help decrease your symptoms  Use a solution of 5 tablespoons of white vinegar mixed with 2 liters of warm water  · Do not have sex until your symptoms go away  Have your partner wear a condom until you complete your course of medication  Follow up with your healthcare provider or gynecologist in 1 week:  Write down your questions so you remember to ask them during your visits  © 2017 [address] Information is for End User's use only and may not be sold, redistributed or otherwise used for commercial purposes  All illustrations and images included in CareNotes® are the copyrighted property of [employer] M [employer]  or [employer]  The above information is an  only  It is not intended as medical advice for individual conditions or treatments  Talk to your doctor, nurse or pharmacist before following any medical regimen to see if it is safe and effective for you

## 2018-07-14 NOTE — ED NOTES
Nursing student assessments and treatments reviewed and approved by myself       Mendel Cox, RN  07/14/18 8819

## 2018-07-16 LAB
CHLAMYDIA DNA CVX QL NAA+PROBE: NORMAL
N GONORRHOEA DNA GENITAL QL NAA+PROBE: NORMAL

## 2018-07-18 ENCOUNTER — DOCUMENTATION (OUTPATIENT)
Dept: OBGYN CLINIC | Facility: HOSPITAL | Age: 28
End: 2018-07-18

## 2018-07-18 NOTE — PROGRESS NOTES
Med refill request for ortho-cyclen from pt via [employer]  I called pt's [facility] pharmacy and verified that they have this medication on file from 6/1/18 with 11 refills  I called pt and left VM saying her medication is at her [facility] pharmacy on [address]  Also responded to pt via [employer]

## 2018-08-27 ENCOUNTER — APPOINTMENT (EMERGENCY)
Dept: RADIOLOGY | Facility: HOSPITAL | Age: 28
End: 2018-08-27
Payer: COMMERCIAL

## 2018-08-27 ENCOUNTER — HOSPITAL ENCOUNTER (EMERGENCY)
Facility: HOSPITAL | Age: 28
Discharge: HOME | End: 2018-08-27
Attending: EMERGENCY MEDICINE | Admitting: EMERGENCY MEDICINE
Payer: COMMERCIAL

## 2018-08-27 VITALS
DIASTOLIC BLOOD PRESSURE: 66 MMHG | BODY MASS INDEX: 31.73 KG/M2 | OXYGEN SATURATION: 100 % | RESPIRATION RATE: 20 BRPM | TEMPERATURE: 98.3 F | SYSTOLIC BLOOD PRESSURE: 124 MMHG | HEIGHT: 64 IN | HEART RATE: 75 BPM | WEIGHT: 185.85 LBS

## 2018-08-27 DIAGNOSIS — M25.511 ACUTE PAIN OF RIGHT SHOULDER: Primary | ICD-10-CM

## 2018-08-27 PROCEDURE — 73030 X-RAY EXAM OF SHOULDER: CPT

## 2018-08-27 PROCEDURE — 99283 EMERGENCY DEPT VISIT LOW MDM: CPT

## 2018-08-27 RX ORDER — IBUPROFEN 400 MG/1
400 TABLET ORAL EVERY 6 HOURS PRN
Qty: 30 TABLET | Refills: 0 | Status: SHIPPED | OUTPATIENT
Start: 2018-08-27 | End: 2018-12-06 | Stop reason: ALTCHOICE

## 2018-08-27 RX ORDER — ACETAMINOPHEN 500 MG
500 TABLET ORAL EVERY 6 HOURS PRN
Qty: 30 TABLET | Refills: 0 | Status: SHIPPED | OUTPATIENT
Start: 2018-08-27 | End: 2018-12-06 | Stop reason: ALTCHOICE

## 2018-08-27 RX ORDER — IBUPROFEN 600 MG/1
600 TABLET ORAL ONCE
Status: COMPLETED | OUTPATIENT
Start: 2018-08-27 | End: 2018-08-27

## 2018-08-27 RX ADMIN — IBUPROFEN 600 MG: 600 TABLET ORAL at 12:41
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
## 2018-08-27 NOTE — DISCHARGE INSTRUCTIONS
Shoulder Pain, Ambulatory Care   GENERAL INFORMATION:   Shoulder pain  is a common problem and can affect your daily activities  Pain can be caused by a problem within your shoulder  Shoulder pain may also be caused by pain that spreads to your shoulder from another part of your body  Seek immediate care for the following symptoms:   · Severe pain    · Inability to move your arm or shoulder    · Numbness or tingling in your shoulder or arm  Treatment for shoulder pain  may include any of the following:  · Acetaminophen  decreases pain and fever  It is available without a doctor's order  Ask how much to take and how often to take it  Follow directions  Acetaminophen can cause liver damage if not taken correctly  · NSAIDs  help decrease swelling and pain or fever  This medicine is available with or without a doctor's order  NSAIDs can cause stomach bleeding or kidney problems in certain people  If you take blood thinner medicine, always ask your healthcare provider if NSAIDs are safe for you  Always read the medicine label and follow directions  · A steroid injection  may help decrease pain and swelling  · Surgery  may be needed for long-term pain and loss of function  Manage your symptoms:   · Apply ice  on your shoulder for 20 to 30 minutes every 2 hours or as directed  Use an ice pack, or put crushed ice in a plastic bag  Cover it with a towel  Ice helps prevent tissue damage and decreases swelling and pain  · Apply heat if ice does not help your symptoms  Apply heat on your shoulder for 20 to 30 minutes every 2 hours for as many days as directed  Heat helps decrease pain and muscle spasms  · Go to physical or occupational therapy as directed  A physical therapist teaches you exercises to help improve movement and strength, and to decrease pain  An occupational therapist teaches you skills to help with your daily activities  Prevent shoulder pain:   · Stretch and strengthen your shoulder  Use proper technique during exercises and sports  · Limit activities as directed  Try to avoid repeated overhead movements  Follow up with your healthcare provider or orthopedist as directed:  Write down your questions so you remember to ask them during your visits  CARE AGREEMENT:   You have the right to help plan your care  Learn about your health condition and how it may be treated  Discuss treatment options with your caregivers to decide what care you want to receive  You always have the right to refuse treatment  The above information is an  only  It is not intended as medical advice for individual conditions or treatments  Talk to your doctor, nurse or pharmacist before following any medical regimen to see if it is safe and effective for you  © 2014 [address] is for End User's use only and may not be sold, redistributed or otherwise used for commercial purposes  All illustrations and images included in CareNotes® are the copyrighted property of [employer] M [employer]  or [employer]  Exercises for Internal and External Shoulder Rotation   AMBULATORY CARE:   Muscles worked during internal and external shoulder exercises:  Exercises for internal shoulder rotation work the muscles in your chest and front of your shoulder  Exercises for external shoulder rotation work the muscles in the back of your shoulder and upper back  Contact your healthcare provider if:   · You have sharp or worsening pain during exercise or at rest     · You have questions or concerns about your shoulder exercises  Before you exercise:  Warm up and stretch before you exercise  Walk or ride a stationary bike for 5 to 10 minutes to help you warm up  Stretching helps increase range of motion  It may also decrease muscle soreness and help prevent another injury   Your healthcare provider will tell you which of the following stretches to do:  · Crossover arm stretch:  Relax your shoulders  Hold your upper arm with the opposite hand  Pull your arm across your chest until you feel a stretch  Hold the stretch for 30 seconds  Return to the starting position  · Shoulder flexion stretch:  Stand facing a wall  Slowly walk your fingers up the wall until you feel a stretch  Hold the stretch for 30 seconds  Return to the starting position  · Sleeper stretch:  Lie on your injured side on a firm, flat surface  Bend the elbow of your injured arm 90° with your hand facing up  Use your arm that is not injured to slowly push your injured arm down  Stop when you feel a stretch at the back of your injured shoulder  Hold the stretch for 30 seconds  Slowly return to the starting position  How to exercise with a weight:  Your healthcare provider will tell you how much weight to exercise with  · Shoulder internal rotation:  Sit in a chair  Place a rolled up towel between your elbow and your side  Bend your elbow to 90°  Gently squeeze the towel with your elbow to prevent it from falling out  Hold the weight with your thumb pointing up  Slowly move the weight across your chest  Stop when your hand reaches your opposite arm  Hold this position for as many seconds as directed  Slowly return to the starting position  · Shoulder external rotation:  Lie on your side with your injured shoulder facing up  Bend your elbow 90°  Place a rolled up towel between your elbow and your side  Hold a weight in your hand  Gently squeeze the towel with your elbow to prevent it from falling out  Slowly rotate your arm outward, but keep your elbow bent  Stop when you feel a stretch  Hold this position for 30 seconds or as directed  Slowly return to the starting position  How to exercise with an exercise band:   · Shoulder internal rotation:  Tie one end of the exercise band to a heavy, secure object  Sit in a chair  Place a rolled up towel between your elbow and your side  Bend your elbow to 90°   Gently squeeze the towel with your elbow to prevent it from falling out  Slowly pull the band across your chest  Stop when your hand reaches your opposite arm  Hold this position for as many seconds as directed  Slowly return to the starting position  · Shoulder external rotation:  Hold one end of the exercise band on the side that is not injured  Place a rolled up towel between your elbow and your side  Bend your elbow 90°  Squeeze the towel with your elbow  Grab the end of the band and slowly turn your arm outward, but keep your elbow bent  Stop when you feel a stretch  Hold this position for 30 seconds or as directed  Slowly return to the starting position  Follow up with your physical therapist as directed:  Write down your questions so you remember to ask them at your visits  © 2017 [address]  Information is for End User's use only and may not be sold, redistributed or otherwise used for commercial purposes  All illustrations and images included in CareNotes® are the copyrighted property of [employer]  or [employer]  The above information is an  only  It is not intended as medical advice for individual conditions or treatments  Talk to your doctor, nurse or pharmacist before following any medical regimen to see if it is safe and effective for you  Exercises for Shoulder Abduction and Adduction   WHAT YOU NEED TO KNOW:   What are shoulder abduction and adduction exercises? Shoulder abduction and adduction exercises work the muscles at the back of your shoulder and your upper back  What should I do before I exercise? Warm up and stretch before you exercise  Walk or ride a stationary bike for 5 to 10 minutes to help you warm up  Stretching helps increase range of motion  It may also decrease muscle soreness and help prevent another injury  Your healthcare provider will tell you which of the following stretches to do:  · Crossover arm stretch:  Relax your shoulders  Hold your upper arm with the opposite hand  Pull your arm across your chest until you feel a stretch  Hold the stretch for 30 seconds  Return to the starting position  · Shoulder flexion stretch:  Stand facing a wall  Slowly walk your fingers up the wall until you feel a stretch  Hold the stretch for 30 seconds  Return to the starting position  · Sleeper stretch:  Lie on your injured side on a firm, flat surface  Bend the elbow of your injured arm 90° with your hand facing up  Use your arm that is not injured to slowly push your injured arm down  Stop when you feel a stretch at the back of your injured shoulder  Hold the stretch for 30 seconds  Slowly return to the starting position  How do I exercise with a weight? Your healthcare provider will tell you how much weight to use  · Shoulder abduction:  Stand and hold a weight in your hand with your palm facing your body  Slowly raise your arm to the side with your thumb pointing up  Then raise your arm over your head as far as you can without pain  Hold this position for as long as directed  Do not raise your arm over your head unless your healthcare provider says it is okay  · Shoulder adduction:  Lie on your back on a firm surface  Extend your arm out to a "T " Bend your elbow so your forearm in the air  Hold a weight in your hand  Slowly raise your arm toward the ceiling and straighten your elbow  Hold this position for as long as directed  Slowly return to the starting position  How do I exercise with an exercise band? · Shoulder abduction:  Wrap the exercise band around a heavy, stable object near your foot  Grab the band with the hand of your injured shoulder  Keep your arm straight  Slowly raise your arm to the side with your thumb pointing up  Then, slowly pull the band over your head as far as you can without pain  Do not raise your arm over your head unless your healthcare provider says it is okay  Do not let your shoulder shrug   Hold this position for as long as directed  Slowly return to the starting position  · Shoulder adduction:  Wrap the exercise band around a heavy, stable object  Stand and face away from where the band is anchored  Hold each end of the band in both hands with your elbows bent  Your elbows should not be behind your body  Keep your arms parallel to the floor and slowly straighten your elbows  Hold this position for as long as directed  Slowly return to the starting position  When should I contact my healthcare provider? · You have sharp or worsening pain during exercise or at rest     · You have questions or concerns about your shoulder exercises  CARE AGREEMENT:   You have the right to help plan your care  Learn about your health condition and how it may be treated  Discuss treatment options with your caregivers to decide what care you want to receive  You always have the right to refuse treatment  The above information is an  only  It is not intended as medical advice for individual conditions or treatments  Talk to your doctor, nurse or pharmacist before following any medical regimen to see if it is safe and effective for you  © 2017 [address] Information is for End User's use only and may not be sold, redistributed or otherwise used for commercial purposes  All illustrations and images included in CareNotes® are the copyrighted property of [employer]  or [employer]

## 2018-09-27 ENCOUNTER — HOSPITAL ENCOUNTER (EMERGENCY)
Facility: HOSPITAL | Age: 28
Discharge: HOME | End: 2018-09-27
Attending: EMERGENCY MEDICINE | Admitting: EMERGENCY MEDICINE
Payer: COMMERCIAL

## 2018-09-27 VITALS
HEART RATE: 85 BPM | BODY MASS INDEX: 31.79 KG/M2 | WEIGHT: 185.19 LBS | DIASTOLIC BLOOD PRESSURE: 66 MMHG | RESPIRATION RATE: 18 BRPM | SYSTOLIC BLOOD PRESSURE: 129 MMHG | TEMPERATURE: 98 F | OXYGEN SATURATION: 100 %

## 2018-09-27 DIAGNOSIS — K29.70 GASTRITIS: Primary | ICD-10-CM

## 2018-09-27 LAB
ALBUMIN SERPL BCP-MCNC: 3.5 G/DL (ref 3.5–5)
ALP SERPL-CCNC: 49 U/L (ref 46–116)
ALT SERPL W P-5'-P-CCNC: 23 U/L (ref 12–78)
ANION GAP SERPL CALCULATED.3IONS-SCNC: 9 MMOL/L (ref 4–13)
AST SERPL W P-5'-P-CCNC: 17 U/L (ref 5–45)
BASOPHILS # BLD AUTO: 0.03 THOUSANDS/ΜL (ref 0–0.1)
BASOPHILS NFR BLD AUTO: 0 % (ref 0–1)
BILIRUB SERPL-MCNC: 0.8 MG/DL (ref 0.2–1)
BILIRUB UR QL STRIP: NEGATIVE
BUN SERPL-MCNC: 17 MG/DL (ref 5–25)
CALCIUM SERPL-MCNC: 8.3 MG/DL (ref 8.3–10.1)
CHLORIDE SERPL-SCNC: 105 MMOL/L (ref 100–108)
CLARITY UR: CLEAR
CO2 SERPL-SCNC: 22 MMOL/L (ref 21–32)
COLOR UR: YELLOW
CREAT SERPL-MCNC: 0.7 MG/DL (ref 0.6–1.3)
EOSINOPHIL # BLD AUTO: 0.11 THOUSAND/ΜL (ref 0–0.61)
EOSINOPHIL NFR BLD AUTO: 2 % (ref 0–6)
ERYTHROCYTE [DISTWIDTH] IN BLOOD BY AUTOMATED COUNT: 12.1 % (ref 11.6–15.1)
EXT PREG TEST URINE: NEGATIVE
GFR SERPL CREATININE-BSD FRML MDRD: 117 ML/MIN/1.73SQ M
GLUCOSE SERPL-MCNC: 78 MG/DL (ref 65–140)
GLUCOSE UR STRIP-MCNC: NEGATIVE MG/DL
HCT VFR BLD AUTO: 34.8 % (ref 34.8–46.1)
HGB BLD-MCNC: 11.7 G/DL (ref 11.5–15.4)
HGB UR QL STRIP.AUTO: NEGATIVE
IMM GRANULOCYTES # BLD AUTO: 0.02 THOUSAND/UL (ref 0–0.2)
IMM GRANULOCYTES NFR BLD AUTO: 0 % (ref 0–2)
KETONES UR STRIP-MCNC: ABNORMAL MG/DL
LEUKOCYTE ESTERASE UR QL STRIP: NEGATIVE
LIPASE SERPL-CCNC: 91 U/L (ref 73–393)
LYMPHOCYTES # BLD AUTO: 2.27 THOUSANDS/ΜL (ref 0.6–4.47)
LYMPHOCYTES NFR BLD AUTO: 30 % (ref 14–44)
MCH RBC QN AUTO: 30.7 PG (ref 26.8–34.3)
MCHC RBC AUTO-ENTMCNC: 33.6 G/DL (ref 31.4–37.4)
MCV RBC AUTO: 91 FL (ref 82–98)
MONOCYTES # BLD AUTO: 0.58 THOUSAND/ΜL (ref 0.17–1.22)
MONOCYTES NFR BLD AUTO: 8 % (ref 4–12)
NEUTROPHILS # BLD AUTO: 4.54 THOUSANDS/ΜL (ref 1.85–7.62)
NEUTS SEG NFR BLD AUTO: 60 % (ref 43–75)
NITRITE UR QL STRIP: NEGATIVE
NRBC BLD AUTO-RTO: 0 /100 WBCS
PH UR STRIP.AUTO: 5 [PH] (ref 4.5–8)
PLATELET # BLD AUTO: 279 THOUSANDS/UL (ref 149–390)
PMV BLD AUTO: 9.5 FL (ref 8.9–12.7)
POTASSIUM SERPL-SCNC: 3.4 MMOL/L (ref 3.5–5.3)
PROT SERPL-MCNC: 7.6 G/DL (ref 6.4–8.2)
PROT UR STRIP-MCNC: NEGATIVE MG/DL
RBC # BLD AUTO: 3.81 MILLION/UL (ref 3.81–5.12)
SODIUM SERPL-SCNC: 136 MMOL/L (ref 136–145)
SP GR UR STRIP.AUTO: >=1.03 (ref 1–1.03)
UROBILINOGEN UR QL STRIP.AUTO: 0.2 E.U./DL
WBC # BLD AUTO: 7.55 THOUSAND/UL (ref 4.31–10.16)

## 2018-09-27 PROCEDURE — 36415 COLL VENOUS BLD VENIPUNCTURE: CPT | Performed by: EMERGENCY MEDICINE

## 2018-09-27 PROCEDURE — 81003 URINALYSIS AUTO W/O SCOPE: CPT

## 2018-09-27 PROCEDURE — 99284 EMERGENCY DEPT VISIT MOD MDM: CPT

## 2018-09-27 PROCEDURE — 81025 URINE PREGNANCY TEST: CPT | Performed by: EMERGENCY MEDICINE

## 2018-09-27 PROCEDURE — 83690 ASSAY OF LIPASE: CPT | Performed by: EMERGENCY MEDICINE

## 2018-09-27 PROCEDURE — 80053 COMPREHEN METABOLIC PANEL: CPT | Performed by: EMERGENCY MEDICINE

## 2018-09-27 PROCEDURE — 85025 COMPLETE CBC W/AUTO DIFF WBC: CPT | Performed by: EMERGENCY MEDICINE

## 2018-09-27 RX ORDER — MAGNESIUM HYDROXIDE/ALUMINUM HYDROXICE/SIMETHICONE 120; 1200; 1200 MG/30ML; MG/30ML; MG/30ML
30 SUSPENSION ORAL ONCE
Status: COMPLETED | OUTPATIENT
Start: 2018-09-27 | End: 2018-09-27

## 2018-09-27 RX ORDER — RANITIDINE 150 MG/1
150 TABLET ORAL 2 TIMES DAILY
Qty: 28 TABLET | Refills: 0 | Status: SHIPPED | OUTPATIENT
Start: 2018-09-27 | End: 2018-10-08 | Stop reason: ALTCHOICE

## 2018-09-27 RX ADMIN — LIDOCAINE HYDROCHLORIDE 10 ML: 20 SOLUTION ORAL; TOPICAL at 09:50

## 2018-09-27 RX ADMIN — Medication 30 ML: at 09:51

## 2018-09-27 NOTE — DISCHARGE INSTRUCTIONS
Gastritis   WHAT YOU NEED TO KNOW:   Gastritis is inflammation or irritation of the lining of your stomach  DISCHARGE INSTRUCTIONS:   Call 911 for any of the following:   · You develop chest pain or shortness of breath  Seek care immediately if:   · You vomit blood  · You have black or bloody bowel movements  · You have severe stomach or back pain  Contact your healthcare provider if:   · You have a fever  · You have new or worsening symptoms, even after treatment  · You have questions or concerns about your condition or care  Medicines:   · Medicines  may be given to help treat a bacterial infection or decrease stomach acid  · Take your medicine as directed  Contact your healthcare provider if you think your medicine is not helping or if you have side effects  Tell him or her if you are allergic to any medicine  Keep a list of the medicines, vitamins, and herbs you take  Include the amounts, and when and why you take them  Bring the list or the pill bottles to follow-up visits  Carry your medicine list with you in case of an emergency  Manage or prevent gastritis:   · Do not smoke  Nicotine and other chemicals in cigarettes and cigars can make your symptoms worse and cause lung damage  Ask your healthcare provider for information if you currently smoke and need help to quit  E-cigarettes or smokeless tobacco still contain nicotine  Talk to your healthcare provider before you use these products  · Do not drink alcohol  Alcohol can prevent healing and make your gastritis worse  Talk to your healthcare provider if you need help to stop drinking  · Do not take NSAIDs or aspirin unless directed  These and similar medicines can cause irritation  If your healthcare provider says it is okay to take NSAIDs, take them with food  · Do not eat foods that cause irritation  Foods such as oranges and salsa can cause burning or pain  Eat a variety of healthy foods   Examples include fruits (not citrus), vegetables, low-fat dairy products, beans, whole-grain breads, and lean meats and fish  Try to eat small meals, and drink water with your meals  Do not eat for at least 3 hours before you go to bed  · Find ways to relax and decrease stress  Stress can increase stomach acid and make gastritis worse  Activities such as yoga, meditation, or listening to music can help you relax  Spend time with friends, or do things you enjoy  Follow up with your healthcare provider as directed: You may need ongoing tests or treatment, or referral to a gastroenterologist  Write down your questions so you remember to ask them during your visits  © 2017 [employer] Information is for End User's use only and may not be sold, redistributed or otherwise used for commercial purposes  All illustrations and images included in CareNotes® are the copyrighted property of [employer]  or [employer]  The above information is an  only  It is not intended as medical advice for individual conditions or treatments  Talk to your doctor, nurse or pharmacist before following any medical regimen to see if it is safe and effective for you

## 2018-09-27 NOTE — ED PROVIDER NOTES
History  Chief Complaint   Patient presents with    Back Pain     c/o left rib/back pain that woke pt from sleep; pain radiates to epigastric area  states it "feels like its burning"  denies injury or any similar hx   Abdominal Pain     27-year-old female presents to the emergency department relating I woke up with this pain    She gestures to the left flank and relates that it is right here    She relates that it is burning and horrible    She believes that this discomfort did wake her from sleep  She had no pain upon going to bed last night  She notes that this discomfort occasionally wraps around from the side towards the anterior aspect of the abdomen  She does not notice anything that modifies it and initially denies that anything seems to change it  During evaluation upon laying down she stated Shari Zelaya is going to make it worse    She denies having noted any nausea, vomiting, change in appetite, problems with bowel movements or urination  She has in general appreciated a fair amount of reflux recently  She relates that she has had similar pain before though not as bad    She had never looked into this previously  She has not taken any medication for the discomfort  Abdominal surgical history significant only for tubal ligation  Prior to Admission Medications   Prescriptions Last Dose Informant Patient Reported?  Taking?   acetaminophen (TYLENOL) 500 mg tablet   No No   Sig: Take 1 tablet (500 mg total) by mouth every 6 (six) hours as needed for mild pain   ibuprofen (MOTRIN) 400 mg tablet   No No   Sig: Take 1 tablet (400 mg total) by mouth every 6 (six) hours as needed for mild pain   norgestimate-ethinyl estradiol (ORTHO-CYCLEN) 0 25-35 MG-MCG per tablet   No No   Sig: Take 1 tablet by mouth daily      Facility-Administered Medications: None       Past Medical History:   Diagnosis Date    Heart murmur     Hemorrhoids     Migraine     Seasonal allergies        Past Surgical History:   Procedure Laterality Date    ABDOMINOPLASTY      [place] HEMORRHOIDECTOMY,INT/EXT, 2+ COLUMNS/GROUPS N/A 3/10/2017    Procedure: HEMORRHOIDECTOMY ;  Surgeon: Lucas Brasher MD;  Location: [facility];  Service: Colorectal    TUBAL LIGATION         History reviewed  No pertinent family history  I have reviewed and agree with the history as documented  Social History   Substance Use Topics    Smoking status: Never Smoker    Smokeless tobacco: Never Used    Alcohol use Yes      Comment: socially        Review of Systems   All other systems reviewed and are negative  Physical Exam  Physical Exam   Constitutional: She is oriented to person, place, and time  She appears well-developed and well-nourished  She appears distressed (Patient appears mildly uncomfortable  She frequently holds the left side  )  HENT:   Head: Normocephalic  Eyes: Conjunctivae and EOM are normal    Cardiovascular: Normal rate and regular rhythm  Pulmonary/Chest: Effort normal and breath sounds normal    Abdominal: Soft  Bowel sounds are normal  She exhibits no distension  There is tenderness ( mild to moderate epigastric and left upper quadrant)  Patient with very mild left CVA tenderness  No right CVA tenderness  Musculoskeletal: Normal range of motion  Neurological: She is alert and oriented to person, place, and time  Skin: Skin is warm and dry  Psychiatric: She has a normal mood and affect  Her behavior is normal    Nursing note and vitals reviewed        Vital Signs  ED Triage Vitals [09/27/18 0923]   Temperature Pulse Respirations Blood Pressure SpO2   98 °F (36 7 °C) 85 18 129/66 100 %      Temp Source Heart Rate Source Patient Position - Orthostatic VS BP Location FiO2 (%)   Oral Monitor Sitting Right arm --      Pain Score       --           Vitals:    09/27/18 0923   BP: 129/66   Pulse: 85   Patient Position - Orthostatic VS: Sitting       Visual Acuity      ED Medications  Medications aluminum-magnesium hydroxide-simethicone (MYLANTA) 200-200-20 mg/5 mL oral suspension 30 mL (30 mL Oral Given 9/27/18 0951)   lidocaine viscous (XYLOCAINE) 2 % mucosal solution 10 mL (10 mL Swish & Swallow Given 9/27/18 0950)       Diagnostic Studies  Results Reviewed     Procedure Component Value Units Date/Time    POCT pregnancy, urine [[id number]]  (Normal) Resulted:  09/27/18 1048    Lab Status:  Final result Updated:  09/27/18 1048     EXT PREG TEST UR (Ref: Negative) negative    Comprehensive metabolic panel [[id number]]  (Abnormal) Collected:  09/27/18 0953    Lab Status:  Final result Specimen:  Blood from Arm, Left Updated:  09/27/18 1033     Sodium 136 mmol/L      Potassium 3 4 (L) mmol/L      Chloride 105 mmol/L      CO2 22 mmol/L      ANION GAP 9 mmol/L      BUN 17 mg/dL      Creatinine 0 70 mg/dL      Glucose 78 mg/dL      Calcium 8 3 mg/dL      AST 17 U/L      ALT 23 U/L      Alkaline Phosphatase 49 U/L      Total Protein 7 6 g/dL      Albumin 3 5 g/dL      Total Bilirubin 0 80 mg/dL      eGFR 117 ml/min/1 73sq m     Narrative:         National Kidney Disease Education Program recommendations are as follows:  GFR calculation is accurate only with a steady state creatinine  Chronic Kidney disease less than 60 ml/min/1 73 sq  meters  Kidney failure less than 15 ml/min/1 73 sq  meters      Lipase [[id number]]  (Normal) Collected:  09/27/18 0953    Lab Status:  Final result Specimen:  Blood from Arm, Left Updated:  09/27/18 1033     Lipase 91 u/L     ED Urine Macroscopic [[id number]]  (Abnormal) Collected:  09/27/18 1031    Lab Status:  Final result Specimen:  Urine Updated:  09/27/18 1018     Color, UA Yellow     Clarity, UA Clear     pH, UA 5 0     Leukocytes, UA Negative     Nitrite, UA Negative     Protein, UA Negative mg/dl      Glucose, UA Negative mg/dl      Ketones, UA 15 (1+) (A) mg/dl      Urobilinogen, UA 0 2 E U /dl      Bilirubin, UA Negative     Blood, UA Negative     Specific Gravity, UA >=1 030 Narrative:       CLINITEK RESULT    CBC and differential [[id number]] Collected:  09/27/18 0953    Lab Status:  Final result Specimen:  Blood from Arm, Left Updated:  09/27/18 1000     WBC 7 55 Thousand/uL      RBC 3 81 Million/uL      Hemoglobin 11 7 g/dL      Hematocrit 34 8 %      MCV 91 fL      MCH 30 7 pg      MCHC 33 6 g/dL      RDW 12 1 %      MPV 9 5 fL      Platelets 671 Thousands/uL      nRBC 0 /100 WBCs      Neutrophils Relative 60 %      Immat GRANS % 0 %      Lymphocytes Relative 30 %      Monocytes Relative 8 %      Eosinophils Relative 2 %      Basophils Relative 0 %      Neutrophils Absolute 4 54 Thousands/µL      Immature Grans Absolute 0 02 Thousand/uL      Lymphocytes Absolute 2 27 Thousands/µL      Monocytes Absolute 0 58 Thousand/µL      Eosinophils Absolute 0 11 Thousand/µL      Basophils Absolute 0 03 Thousands/µL                  No orders to display              Procedures  Procedures       Phone Contacts  ED Phone Contact    ED Course  ED Course as of Sep 27 1240   Thu Sep 27, 2018   1043 Patient feeling much improved at this time  Discussed plan to initiate H2 blocker and have patient follow up with PCP  MDM  CritCare Time    Disposition  Final diagnoses:   Gastritis     Time reflects when diagnosis was documented in both MDM as applicable and the Disposition within this note     Time User Action Codes Description Comment    9/27/2018 10:47 AM Kristen PALOMO Add [K29 70] Gastritis       ED Disposition     ED Disposition Condition Comment    Discharge  Raven Lombardo discharge to home/self care  Condition at discharge: Good        Follow-up Information     Follow up With Specialties Details Why Contact Jorge Corona PA-C Internal Medicine, Physician Assistant Schedule an appointment as soon as possible for a visit For re-evaluation in 1 to 2 weeks [address] [facility]  [address] [place] [address],[address]  [address] Discharge Medication List as of 9/27/2018 10:52 AM      START taking these medications    Details   ranitidine (ZANTAC) 150 mg tablet Take 1 tablet (150 mg total) by mouth 2 (two) times a day, Starting Thu 9/27/2018, Print         CONTINUE these medications which have NOT CHANGED    Details   acetaminophen (TYLENOL) 500 mg tablet Take 1 tablet (500 mg total) by mouth every 6 (six) hours as needed for mild pain, Starting Mon 8/27/2018, Print      ibuprofen (MOTRIN) 400 mg tablet Take 1 tablet (400 mg total) by mouth every 6 (six) hours as needed for mild pain, Starting Mon 8/27/2018, Print      norgestimate-ethinyl estradiol (ORTHO-CYCLEN) 0 25-35 MG-MCG per tablet Take 1 tablet by mouth daily, Starting Fri 6/1/2018, Normal           No discharge procedures on file      ED Provider  Electronically Signed by           Chris Oliva MD  09/27/18 6924

## 2018-10-04 ENCOUNTER — HOSPITAL ENCOUNTER (EMERGENCY)
Facility: HOSPITAL | Age: 28
Discharge: HOME | End: 2018-10-04
Attending: EMERGENCY MEDICINE | Admitting: EMERGENCY MEDICINE
Payer: COMMERCIAL

## 2018-10-04 VITALS
HEIGHT: 64 IN | RESPIRATION RATE: 18 BRPM | WEIGHT: 182 LBS | BODY MASS INDEX: 31.07 KG/M2 | DIASTOLIC BLOOD PRESSURE: 60 MMHG | TEMPERATURE: 99.2 F | OXYGEN SATURATION: 97 % | HEART RATE: 72 BPM | SYSTOLIC BLOOD PRESSURE: 132 MMHG

## 2018-10-04 DIAGNOSIS — J03.90 TONSILLITIS: Primary | ICD-10-CM

## 2018-10-04 DIAGNOSIS — R50.9 FEVER: ICD-10-CM

## 2018-10-04 LAB
BACTERIA UR QL AUTO: ABNORMAL /HPF
BASOPHILS # BLD AUTO: 0.03 THOUSANDS/ΜL (ref 0–0.1)
BASOPHILS NFR BLD AUTO: 0 % (ref 0–1)
BILIRUB UR QL STRIP: NEGATIVE
CLARITY UR: CLEAR
COLOR UR: YELLOW
EOSINOPHIL # BLD AUTO: 0.01 THOUSAND/ΜL (ref 0–0.61)
EOSINOPHIL NFR BLD AUTO: 0 % (ref 0–6)
ERYTHROCYTE [DISTWIDTH] IN BLOOD BY AUTOMATED COUNT: 12.1 % (ref 11.6–15.1)
EXT PREG TEST URINE: NEGATIVE
GLUCOSE UR STRIP-MCNC: NEGATIVE MG/DL
HCT VFR BLD AUTO: 35.2 % (ref 34.8–46.1)
HGB BLD-MCNC: 12 G/DL (ref 11.5–15.4)
HGB UR QL STRIP.AUTO: NEGATIVE
IMM GRANULOCYTES # BLD AUTO: 0.07 THOUSAND/UL (ref 0–0.2)
IMM GRANULOCYTES NFR BLD AUTO: 0 % (ref 0–2)
KETONES UR STRIP-MCNC: NEGATIVE MG/DL
LEUKOCYTE ESTERASE UR QL STRIP: ABNORMAL
LYMPHOCYTES # BLD AUTO: 0.81 THOUSANDS/ΜL (ref 0.6–4.47)
LYMPHOCYTES NFR BLD AUTO: 5 % (ref 14–44)
MCH RBC QN AUTO: 30.5 PG (ref 26.8–34.3)
MCHC RBC AUTO-ENTMCNC: 34.1 G/DL (ref 31.4–37.4)
MCV RBC AUTO: 90 FL (ref 82–98)
MONOCYTES # BLD AUTO: 1.18 THOUSAND/ΜL (ref 0.17–1.22)
MONOCYTES NFR BLD AUTO: 7 % (ref 4–12)
NEUTROPHILS # BLD AUTO: 15.18 THOUSANDS/ΜL (ref 1.85–7.62)
NEUTS SEG NFR BLD AUTO: 88 % (ref 43–75)
NITRITE UR QL STRIP: NEGATIVE
NON-SQ EPI CELLS URNS QL MICRO: ABNORMAL /HPF
NRBC BLD AUTO-RTO: 0 /100 WBCS
PH UR STRIP.AUTO: 7 [PH] (ref 4.5–8)
PLATELET # BLD AUTO: 264 THOUSANDS/UL (ref 149–390)
PMV BLD AUTO: 9.6 FL (ref 8.9–12.7)
PROT UR STRIP-MCNC: NEGATIVE MG/DL
RBC # BLD AUTO: 3.93 MILLION/UL (ref 3.81–5.12)
RBC #/AREA URNS AUTO: ABNORMAL /HPF
S PYO AG THROAT QL: NEGATIVE
SP GR UR STRIP.AUTO: 1.01 (ref 1–1.03)
UROBILINOGEN UR QL STRIP.AUTO: 0.2 E.U./DL
WBC # BLD AUTO: 17.28 THOUSAND/UL (ref 4.31–10.16)
WBC #/AREA URNS AUTO: ABNORMAL /HPF

## 2018-10-04 PROCEDURE — 87631 RESP VIRUS 3-5 TARGETS: CPT | Performed by: PHYSICIAN ASSISTANT

## 2018-10-04 PROCEDURE — 81001 URINALYSIS AUTO W/SCOPE: CPT | Performed by: PHYSICIAN ASSISTANT

## 2018-10-04 PROCEDURE — 96360 HYDRATION IV INFUSION INIT: CPT

## 2018-10-04 PROCEDURE — 85025 COMPLETE CBC W/AUTO DIFF WBC: CPT | Performed by: PHYSICIAN ASSISTANT

## 2018-10-04 PROCEDURE — 36415 COLL VENOUS BLD VENIPUNCTURE: CPT | Performed by: PHYSICIAN ASSISTANT

## 2018-10-04 PROCEDURE — 87430 STREP A AG IA: CPT | Performed by: PHYSICIAN ASSISTANT

## 2018-10-04 PROCEDURE — 81025 URINE PREGNANCY TEST: CPT | Performed by: PHYSICIAN ASSISTANT

## 2018-10-04 PROCEDURE — 99283 EMERGENCY DEPT VISIT LOW MDM: CPT

## 2018-10-04 RX ORDER — IBUPROFEN 600 MG/1
600 TABLET ORAL ONCE
Status: COMPLETED | OUTPATIENT
Start: 2018-10-04 | End: 2018-10-04

## 2018-10-04 RX ORDER — AMOXICILLIN 500 MG/1
500 CAPSULE ORAL EVERY 12 HOURS SCHEDULED
Qty: 20 CAPSULE | Refills: 0 | Status: SHIPPED | OUTPATIENT
Start: 2018-10-04 | End: 2018-10-14

## 2018-10-04 RX ORDER — AMOXICILLIN 500 MG/1
500 CAPSULE ORAL EVERY 12 HOURS SCHEDULED
Qty: 20 CAPSULE | Refills: 0 | Status: SHIPPED | OUTPATIENT
Start: 2018-10-04 | End: 2018-10-04

## 2018-10-04 RX ADMIN — DEXAMETHASONE SODIUM PHOSPHATE 10 MG: 10 INJECTION, SOLUTION INTRAMUSCULAR; INTRAVENOUS at 21:43

## 2018-10-04 RX ADMIN — IBUPROFEN 600 MG: 600 TABLET ORAL at 20:31

## 2018-10-04 RX ADMIN — SODIUM CHLORIDE 1000 ML: 0.9 INJECTION, SOLUTION INTRAVENOUS at 20:33

## 2018-10-04 NOTE — ED PROVIDER NOTES
History  Chief Complaint   Patient presents with    Fever - 9 weeks to 74 years     Fever and sore throat today  Tylenol 4 hours ago  Yony Malone is a 33 y o  female who presents to the ED with complaints of fever (Tmax 102), body aches, sore throat, and nausea x 1 day  Denies HA, nasal congestion, rhinorrhea, abdominal pain, NVD, dysphagia, voice changes, drooling, trismus, chest pain, SOB, dysuria, urinary frequency, urinary urgency, hematuria, abdominal pain, NVD, cough  Denies sick contacts  Denies current influenza vaccination  Patient does have young children at home  Sore Throat   Location:  Generalized  Quality:  Sore  Severity:  Severe  Onset quality:  Sudden  Duration:  1 day  Timing:  Constant  Progression:  Unchanged  Chronicity:  New  Relieved by:  Cold food  Associated symptoms: adenopathy, chills and fever    Associated symptoms: no abdominal pain, no chest pain, no cough, no drooling, no ear discharge, no ear pain, no epistaxis, no eye discharge, no headaches, no neck stiffness, no night sweats, no plugged ear sensation, no postnasal drip, no rash, no rhinorrhea, no shortness of breath, no sinus congestion, no stridor, no trouble swallowing and no voice change    Risk factors: no exposure to strep, no exposure to mono, no sick contacts and no recent dental procedure        Prior to Admission Medications   Prescriptions Last Dose Informant Patient Reported?  Taking?   acetaminophen (TYLENOL) 500 mg tablet   No No   Sig: Take 1 tablet (500 mg total) by mouth every 6 (six) hours as needed for mild pain   ibuprofen (MOTRIN) 400 mg tablet   No No   Sig: Take 1 tablet (400 mg total) by mouth every 6 (six) hours as needed for mild pain   norgestimate-ethinyl estradiol (ORTHO-CYCLEN) 0 25-35 MG-MCG per tablet   No No   Sig: Take 1 tablet by mouth daily   ranitidine (ZANTAC) 150 mg tablet   No No   Sig: Take 1 tablet (150 mg total) by mouth 2 (two) times a day Facility-Administered Medications: None       Past Medical History:   Diagnosis Date    Heart murmur     Hemorrhoids     Migraine     Seasonal allergies        Past Surgical History:   Procedure Laterality Date    ABDOMINOPLASTY      [place] HEMORRHOIDECTOMY,INT/EXT, 2+ COLUMNS/GROUPS N/A 3/10/2017    Procedure: HEMORRHOIDECTOMY ;  Surgeon: Marky Jain MD;  [facility];  Service: Colorectal    TUBAL LIGATION         History reviewed  No pertinent family history  I have reviewed and agree with the history as documented  Social History   Substance Use Topics    Smoking status: Never Smoker    Smokeless tobacco: Never Used    Alcohol use Yes      Comment: socially        Review of Systems   Constitutional: Positive for chills and fever  Negative for appetite change, diaphoresis, fatigue, night sweats and unexpected weight change  HENT: Positive for sore throat  Negative for congestion, drooling, ear discharge, ear pain, nosebleeds, postnasal drip, rhinorrhea, trouble swallowing and voice change  Eyes: Negative for pain, discharge, redness and visual disturbance  Respiratory: Negative for cough, shortness of breath, wheezing and stridor  Cardiovascular: Negative for chest pain, palpitations and leg swelling  Gastrointestinal: Negative for abdominal pain, blood in stool, constipation, diarrhea, nausea and vomiting  Genitourinary: Negative for dysuria, flank pain, frequency, hematuria and urgency  Musculoskeletal: Positive for myalgias  Negative for arthralgias, back pain, gait problem, joint swelling, neck pain and neck stiffness  Skin: Negative for color change, pallor, rash and wound  Neurological: Negative for dizziness, seizures, light-headedness and headaches  Hematological: Positive for adenopathy  Does not bruise/bleed easily  Physical Exam  Physical Exam   Constitutional: She is oriented to person, place, and time   She appears well-developed and well-nourished  She is cooperative  Non-toxic appearance  No distress  HENT:   Head: Normocephalic and atraumatic  Right Ear: Hearing, external ear and ear canal normal  Tympanic membrane is bulging  A middle ear effusion is present  Left Ear: Hearing, external ear and ear canal normal  Tympanic membrane is bulging  A middle ear effusion is present  Nose: Nose normal    Mouth/Throat: Uvula is midline and mucous membranes are normal  Oropharyngeal exudate, posterior oropharyngeal edema and posterior oropharyngeal erythema present  Tonsils are 2+ on the right  Tonsils are 2+ on the left  Tonsillar exudate  No tonsillar asymmetry  No trismus  No drooling  Eyes: Pupils are equal, round, and reactive to light  Conjunctivae, EOM and lids are normal    Neck: Trachea normal, normal range of motion, full passive range of motion without pain and phonation normal  Neck supple  No muscular tenderness present  No neck rigidity  No Brudzinski's sign and no Kernig's sign noted  Cardiovascular: Normal rate, regular rhythm, intact distal pulses and normal pulses  Pulmonary/Chest: Effort normal and breath sounds normal  No respiratory distress  She has no wheezes  She has no rales  Abdominal: Soft  Bowel sounds are normal    Musculoskeletal: Normal range of motion  Lymphadenopathy:     She has cervical adenopathy  She has no axillary adenopathy  Neurological: She is alert and oriented to person, place, and time  Skin: Skin is warm and dry  Capillary refill takes less than 2 seconds  Psychiatric: She has a normal mood and affect  Nursing note and vitals reviewed        Vital Signs  ED Triage Vitals   Temperature Pulse Respirations Blood Pressure SpO2   10/04/18 1923 10/04/18 1923 10/04/18 1923 10/04/18 1923 10/04/18 1923   (!) 102 7 °F (39 3 °C) (!) 119 20 132/60 97 %      Temp Source Heart Rate Source Patient Position - Orthostatic VS BP Location FiO2 (%)   10/04/18 2138 10/04/18 2138 -- -- -- Oral Monitor         Pain Score       10/04/18 1923       Worst Possible Pain           Vitals:    10/04/18 1923 10/04/18 2138   BP: 132/60    Pulse: (!) 119 72       Visual Acuity      ED Medications  Medications   ibuprofen (MOTRIN) tablet 600 mg (600 mg Oral Given 10/4/18 2031)   sodium chloride 0 9 % bolus 1,000 mL (0 mL Intravenous Stopped 10/4/18 2138)   dexamethasone 10 mg/mL oral liquid 10 mg 1 mL (10 mg Oral Given 10/4/18 2143)       Diagnostic Studies  Results Reviewed     Procedure Component Value Units Date/Time    Urine Microscopic [[id number]]  (Abnormal) Collected:  10/04/18 2108    Lab Status:  Final result Specimen:  Urine from Urine, Clean Catch Updated:  10/04/18 2143     RBC, UA None Seen /hpf      WBC, UA 2-4 (A) /hpf      Epithelial Cells Occasional /hpf      Bacteria, UA Occasional /hpf     UA w Reflex to Microscopic [[id number]]  (Abnormal) Collected:  10/04/18 2108    Lab Status:  Final result Specimen:  Urine from Urine, Clean Catch Updated:  10/04/18 2117     Color, UA Yellow     Clarity, UA Clear     Specific Gravity, UA 1 010     pH, UA 7 0     Leukocytes, UA Small (A)     Nitrite, UA Negative     Protein, UA Negative mg/dl      Glucose, UA Negative mg/dl      Ketones, UA Negative mg/dl      Urobilinogen, UA 0 2 E U /dl      Bilirubin, UA Negative     Blood, UA Negative    POCT pregnancy, urine [[id number]]  (Normal) Resulted:  10/04/18 2113    Lab Status:  Final result Updated:  10/04/18 2113     EXT PREG TEST UR (Ref: Negative) negative    CBC and differential [[id number]]  (Abnormal) Collected:  10/04/18 2032    Lab Status:  Final result Specimen:  Blood from Arm, Right Updated:  10/04/18 2038     WBC 17 28 (H) Thousand/uL      RBC 3 93 Million/uL      Hemoglobin 12 0 g/dL      Hematocrit 35 2 %      MCV 90 fL      MCH 30 5 pg      MCHC 34 1 g/dL      RDW 12 1 %      MPV 9 6 fL      Platelets 944 Thousands/uL      nRBC 0 /100 WBCs      Neutrophils Relative 88 (H) %      Immat GRANS % 0 % Lymphocytes Relative 5 (L) %      Monocytes Relative 7 %      Eosinophils Relative 0 %      Basophils Relative 0 %      Neutrophils Absolute 15 18 (H) Thousands/µL      Immature Grans Absolute 0 07 Thousand/uL      Lymphocytes Absolute 0 81 Thousands/µL      Monocytes Absolute 1 18 Thousand/µL      Eosinophils Absolute 0 01 Thousand/µL      Basophils Absolute 0 03 Thousands/µL     Influenza A/B and RSV by PCR (indicated for patients >2 mo of age) [[id number]] Collected:  10/04/18 2032    Lab Status: In process Specimen:  Nasopharyngeal from Nasopharyngeal Swab Updated:  10/04/18 2035    Rapid Strep A Screen Only, Adults [[id number]]  (Normal) Collected:  10/04/18 1948    Lab Status:  Final result Specimen:  Throat from Throat Updated:  10/04/18 2002     Rapid Strep A Screen Negative                 No orders to display              Procedures  Procedures       Phone Contacts  ED Phone Contact    ED Course  ED Course as of Oct 04 2223   Thu Oct 04, 2018   2056 Patient states she is feeling better after ibuprofen and fluids  Most likely tonsillitis in etiology  Will wait for urine  2124 Educated patient regarding diagnosis and management  Advised patient to follow up with PCP  Advised patient to RTER for persistent or worsening symptoms  MDM  Number of Diagnoses or Management Options  Fever: new and does not require workup  Tonsillitis: new and does not require workup  Diagnosis management comments: Larita Blizzard is a 33 y o  female who presented ED with complaints of fever (Tmax 102), body aches, sore throat, and nausea x 1 day  Initial examination revealed enlarged tonsils, no exudates on the tonsils and oropharynx, and cervical anterior lymphadenopathy  Centor Criteria = 4/4  Rapid strep negative  Most likely tonsillitis and bacterial etiology  Patient improved clinically and vital signs stabilized after ibuprofen and IVF   Educated patient regarding diagnosis and management  Advised patient to follow up with PCP  Advised patient to RTER for persistent or worsening symptoms  Patient Progress  Patient progress: improved    CritCare Time    Disposition  Final diagnoses: Tonsillitis   Fever     Time reflects when diagnosis was documented in both MDM as applicable and the Disposition within this note     Time User Action Codes Description Comment    10/4/2018  9:24 PM Aneta Thomas Add [J03 90] Tonsillitis     10/4/2018  9:24 PM Aneta Crews [R50 9] Fever       ED Disposition     ED Disposition Condition Comment    Discharge  Uriel Mitchelld discharge to home/self care  Condition at discharge: Good        Follow-up Information     Follow up With Specialties Details Why Contact Info Additional [address] Emergency Department Emergency Medicine Go to If symptoms worsen [address] [id number]  [phone]  ED, [address] GABI Willson Internal Medicine, Physician Assistant Schedule an appointment as soon as possible for a visit     [address] [facility]  [phone]             Patient's Medications   Discharge Prescriptions    AMOXICILLIN (AMOXIL) 500 MG CAPSULE    Take 1 capsule (500 mg total) by mouth every 12 (twelve) hours for 10 days       Start Date: 10/4/2018 End Date: 10/14/2018       Order Dose: 500 mg       Quantity: 20 capsule    Refills: 0     No discharge procedures on file      ED Provider  Electronically Signed by           Roseanna Garcia PA-C  10/04/18 2613

## 2018-10-05 LAB
FLUAV AG SPEC QL: NORMAL
FLUBV AG SPEC QL: NORMAL
RSV B RNA SPEC QL NAA+PROBE: NORMAL

## 2018-10-05 NOTE — DISCHARGE INSTRUCTIONS
Tonsillitis   WHAT YOU NEED TO KNOW:   Tonsillitis is inflammation of your tonsils  Tonsils are the lumps of tissue on both sides of the back of your throat  Tonsils are part of your immune system  They help you fight infections  Recurrent tonsillitis is when you have tonsillitis many times in 1 year  Chronic tonsillitis is when you have a sore throat that lasts 3 months or longer  DISCHARGE INSTRUCTIONS:   Medicines: You may need any of the following:  · Acetaminophen  decreases pain and fever  It is available without a doctor's order  Ask how much to take and how often to take it  Follow directions  Acetaminophen can cause liver damage if not taken correctly  · NSAIDs , such as ibuprofen, help decrease swelling, pain, and fever  This medicine is available with or without a doctor's order  NSAIDs can cause stomach bleeding or kidney problems in certain people  If you take blood thinner medicine, always ask your healthcare provider if NSAIDs are safe for you  Always read the medicine label and follow directions  · Antibiotics  help treat a bacterial infection  · Take your medicine as directed  Contact your healthcare provider if you think your medicine is not helping or if you have side effects  Tell him or her if you are allergic to any medicine  Keep a list of the medicines, vitamins, and herbs you take  Include the amounts, and when and why you take them  Bring the list or the pill bottles to follow-up visits  Carry your medicine list with you in case of an emergency  Call 911 for the following:   · You have trouble breathing because your tonsils are swollen  Contact your healthcare provider if:   · You have a fever  · Your pain gets worse or does not get better after you take pain medicine  · Your sore throat is not better after you have finished antibiotic treatment  · You have trouble sleeping and wake up trying to catch your breath      · You have questions or concerns about your condition or care  Rest  when you feel it is needed  Slowly start to do more each day  Return to your daily activities as directed  Drink liquids as directed: You may need to drink more liquid than usual to help prevent dehydration  Ask how much liquid to drink each day and which liquids are best for you  Gargle with warm salt water: This may help decrease throat pain  Mix 1 teaspoon of salt in 8 ounces of warm water  Ask how often you should do this  Prevent the spread of germs:  Wash your hands often  Do not share food or drinks with anyone  You may be able to return to work when you feel better and your fever is gone for at least 24 hours  Follow up with your healthcare provider as directed:  Write down your questions so you remember to ask them during your visits  © 2017 [address] [employer] Information is for End User's use only and may not be sold, redistributed or otherwise used for commercial purposes  All illustrations and images included in CareNotes® are the copyrighted property of A D A M , Inc  or [employer]  The above information is an  only  It is not intended as medical advice for individual conditions or treatments  Talk to your doctor, nurse or pharmacist before following any medical regimen to see if it is safe and effective for you  Fever in Adults   WHAT YOU NEED TO KNOW:   A fever is an increase in your body temperature  Normal body temperature is 98 6°F (37°C)  Fever is generally defined as greater than 100 4°F (38°C)  Common causes include an infection, injury, or disease such as arthritis  DISCHARGE INSTRUCTIONS:   Return to the emergency department if:   · Your fever does not go away or gets worse even after treatment  · You have a stiff neck and a bad headache  · You are confused  You may not be able to think clearly or remember things like you normally do       · Your heart beats faster than usual even after treatment  · You have shortness of breath or chest pain when you breathe  · You urinate small amounts or not at all  · Your skin, lips, or nails turn blue  Contact your healthcare provider if:   · You have abdominal pain or you feel bloated  · You have nausea or are vomiting  · You have pain or burning when you urinate, or you have pain in your back  · You have questions or concerns about your condition or care  Medicines: You may need any of the following:  · NSAIDs , such as ibuprofen, help decrease swelling, pain, and fever  This medicine is available with or without a doctor's order  NSAIDs can cause stomach bleeding or kidney problems in certain people  If you take blood thinner medicine, always ask if NSAIDs are safe for you  Always read the medicine label and follow directions  Do not give these medicines to children under 10months of age without direction from your child's healthcare provider  · Acetaminophen  decreases pain and fever  It is available without a doctor's order  Ask how much to take and how often to take it  Follow directions  Read the labels of all other medicines you are using to see if they also contain acetaminophen, or ask your doctor or pharmacist  Acetaminophen can cause liver damage if not taken correctly  Do not use more than 4 grams (4,000 milligrams) total of acetaminophen in one day  · Antibiotics  may be given if you have an infection caused by bacteria  · Take your medicine as directed  Contact your healthcare provider if you think your medicine is not helping or if you have side effects  Tell him of her if you are allergic to any medicine  Keep a list of the medicines, vitamins, and herbs you take  Include the amounts, and when and why you take them  Bring the list or the pill bottles to follow-up visits  Carry your medicine list with you in case of an emergency    Follow up with your healthcare provider as directed:  Write down your questions so you remember to ask them during your visits  Self-care:   · Drink more liquids as directed  A fever makes you sweat  This can increase your risk for dehydration  Liquids can help prevent dehydration  ¨ Drink at least 6 to 8 eight-ounce cups of clear liquids each day  Drink water, juice, or broth  Do not drink sports drinks  They may contain caffeine  ¨ Ask your healthcare provider if you should drink an oral rehydration solution (ORS)  An ORS has the right amounts of water, salts, and sugar you need to replace body fluids  · Dress in lightweight clothes  Shivers may be a sign that your fever is rising  Do not put extra blankets or clothes on  This may cause your fever to rise even higher  Dress in light, comfortable clothing  Use a lightweight blanket or sheet when you sleep  Change your clothes, blanket, or sheets if they get wet  · Cool yourself safely  Take a bath in cool or lukewarm water  Use an ice pack wrapped in a small towel or wet a washcloth with cool water  Place the ice pack or wet washcloth on your forehead or the back of your neck  © 2017 [facility] [facility] Information is for End User's use only and may not be sold, redistributed or otherwise used for commercial purposes  All illustrations and images included in CareNotes® are the copyrighted property of [employer] Inc  or [employer]ino[employer]  The above information is an  only  It is not intended as medical advice for individual conditions or treatments  Talk to your doctor, nurse or pharmacist before following any medical regimen to see if it is safe and effective for you

## 2018-10-07 PROBLEM — A74.9 CHLAMYDIA: Status: ACTIVE | Noted: 2017-09-15

## 2018-10-07 PROBLEM — A74.9 CHLAMYDIA: Status: RESOLVED | Noted: 2017-09-15 | Resolved: 2018-10-07

## 2018-10-07 PROBLEM — K64.9 HEMORRHOIDS: Status: RESOLVED | Noted: 2017-02-21 | Resolved: 2018-10-07

## 2018-10-07 PROBLEM — K64.9 HEMORRHOIDS: Status: ACTIVE | Noted: 2017-02-21

## 2018-10-07 PROBLEM — Z01.419 ENCOUNTER FOR GYNECOLOGICAL EXAMINATION WITHOUT ABNORMAL FINDING: Status: RESOLVED | Noted: 2018-06-01 | Resolved: 2018-10-07

## 2018-10-07 PROBLEM — Z72.51 HIGH RISK SEXUAL BEHAVIOR: Status: RESOLVED | Noted: 2018-06-01 | Resolved: 2018-10-07

## 2018-10-08 ENCOUNTER — OFFICE VISIT (OUTPATIENT)
Dept: INTERNAL MEDICINE CLINIC | Facility: CLINIC | Age: 28
End: 2018-10-08
Payer: COMMERCIAL

## 2018-10-08 VITALS
SYSTOLIC BLOOD PRESSURE: 104 MMHG | WEIGHT: 183.42 LBS | HEIGHT: 63 IN | TEMPERATURE: 98.1 F | DIASTOLIC BLOOD PRESSURE: 82 MMHG | HEART RATE: 64 BPM | BODY MASS INDEX: 32.5 KG/M2

## 2018-10-08 DIAGNOSIS — K21.9 GASTROESOPHAGEAL REFLUX DISEASE WITHOUT ESOPHAGITIS: Primary | ICD-10-CM

## 2018-10-08 DIAGNOSIS — Z23 NEED FOR INFLUENZA VACCINATION: ICD-10-CM

## 2018-10-08 DIAGNOSIS — B37.3 VAGINAL CANDIDIASIS: ICD-10-CM

## 2018-10-08 DIAGNOSIS — J02.8 ACUTE PHARYNGITIS DUE TO OTHER SPECIFIED ORGANISMS: ICD-10-CM

## 2018-10-08 DIAGNOSIS — Z23 NEED FOR TDAP VACCINATION: ICD-10-CM

## 2018-10-08 PROBLEM — B37.31 VAGINAL CANDIDIASIS: Status: ACTIVE | Noted: 2018-10-08

## 2018-10-08 PROCEDURE — 90686 IIV4 VACC NO PRSV 0.5 ML IM: CPT

## 2018-10-08 PROCEDURE — 90715 TDAP VACCINE 7 YRS/> IM: CPT

## 2018-10-08 PROCEDURE — 90472 IMMUNIZATION ADMIN EACH ADD: CPT

## 2018-10-08 PROCEDURE — 99213 OFFICE O/P EST LOW 20 MIN: CPT | Performed by: PHYSICIAN ASSISTANT

## 2018-10-08 PROCEDURE — 90471 IMMUNIZATION ADMIN: CPT

## 2018-10-08 RX ORDER — OMEPRAZOLE 20 MG/1
20 CAPSULE, DELAYED RELEASE ORAL DAILY
Qty: 15 CAPSULE | Refills: 0 | Status: SHIPPED | OUTPATIENT
Start: 2018-10-08 | End: 2018-11-18 | Stop reason: SDUPTHER

## 2018-10-08 RX ORDER — FLUCONAZOLE 150 MG/1
150 TABLET ORAL ONCE
Qty: 2 TABLET | Refills: 0 | Status: SHIPPED | OUTPATIENT
Start: 2018-10-08 | End: 2018-10-08

## 2018-10-08 NOTE — PROGRESS NOTES
Assessment/Plan:    No problem-specific Assessment & Plan notes found for this encounter  Diagnoses and all orders for this visit:    Gastroesophageal reflux disease without esophagitis  -     omeprazole (PriLOSEC) 20 mg delayed release capsule; Take 1 capsule (20 mg total) by mouth daily for 15 days    Acute pharyngitis due to other specified organisms    Vaginal candidiasis  -     fluconazole (DIFLUCAN) 150 mg tablet; Take 1 tablet (150 mg total) by mouth once for 1 dose    Need for Tdap vaccination  -     TDAP VACCINE GREATER THAN OR EQUAL TO 8YO IM    Need for influenza vaccination  -     SYRINGE/SINGLE-DOSE VIAL: influenza vaccine, [id number], quadrivalent, 0 5 mL, preservative-free, for patients 3+ yr (FLUZONE)          Subjective:      Patient ID: Desirae Soriano is a 33 y o  female  Pt here for follow up from ER X 2  9/27 for reflux abdominal pain and again on 10/4 for tonsillitis was placed on amoxicillin twice a day for 10 days is on day 4, and admits throat is improving  Patient reports that she is had much more mild heartburn in the past that resolved in a short period of time  Patient reports that this time when she had the very sharp epigastric pain it woke her from sleep  This is what prompted her to report to the emergency room  Patient does have a number of foods and liquids that can be contributing to her symptoms  We discussed these and that we will be providing her with an outpatient list for modification  Patient has been taking the Zantac twice daily as prescribed in the emergency room on September 28th  Patient reports that she has had some relief of her pa her triggers  in with this medication however she feels it is making her nauseous  We discussed that we will place her on a different medication to take once daily for the next 2 weeks    Patient then instructed to start adding foods back into her diet 1 at a time after she is off the medication and if she develops reflux and pain then this will help identify        When asked patient does confirm that she currently has been having episode of vaginal candidiasis secondary to the antibiotic use  Patient reports she is on birth control pills however she had a tubal ligation and therefore does not require backup method for protection while on the antibiotic  Heartburn   She complains of abdominal pain, heartburn, nausea and a sore throat (improving)  She reports no belching, no chest pain or no coughing  This is a recurrent problem  The current episode started 1 to 4 weeks ago  The problem occurs constantly  The problem has been gradually improving (taking the zantac twice a day and some improvement to epigastric pain, except feels nauseaus with med)  The heartburn duration is an hour  The heartburn is of moderate intensity  The heartburn wakes (when went to ER) her from sleep  The heartburn limits her activity  The heartburn doesn't change with position  Pertinent negatives include no fatigue or weight loss  She has tried an antacid for the symptoms  The treatment provided moderate relief  The following portions of the patient's history were reviewed and updated as appropriate: allergies, current medications, past family history, past medical history, past social history, past surgical history and problem list     Review of Systems   Constitutional: Negative for chills, fatigue, fever, unexpected weight change and weight loss  HENT: Positive for sore throat (improving)  Eyes: Negative  Respiratory: Negative  Negative for cough and shortness of breath  Cardiovascular: Negative  Negative for chest pain and palpitations  Gastrointestinal: Positive for abdominal pain, heartburn and nausea  Negative for constipation and diarrhea  Endocrine: Negative  Genitourinary: Negative  Negative for dysuria and frequency  Musculoskeletal: Negative  Skin: Negative  Negative for rash  Allergic/Immunologic: Positive for environmental allergies  Neurological: Negative  Negative for light-headedness and headaches  Psychiatric/Behavioral: Negative  Objective:      /82 (BP Location: Right arm, Patient Position: Sitting, Cuff Size: Standard)   Pulse 64   Temp 98 1 °F (36 7 °C) (Oral)   Ht 5' 3" (1 6 m)   Wt 83 2 kg (183 lb 6 8 oz)   BMI 32 49 kg/m²          Physical Exam   Constitutional: She is oriented to person, place, and time  She appears well-developed and well-nourished  No distress  HENT:   Head: Normocephalic and atraumatic  Right Ear: External ear normal    Left Ear: External ear normal    Mouth/Throat: Oropharynx is clear and moist    On exam in a patient patient's posterior pharynx is without swelling, no erythema, no exudates  Eyes: Pupils are equal, round, and reactive to light  Neck: Normal range of motion  Cardiovascular: Normal rate, regular rhythm and normal heart sounds  Pulmonary/Chest: Effort normal and breath sounds normal    Abdominal: Soft  Normal appearance  There is tenderness in the epigastric area  There is no rigidity, no rebound, no guarding and negative Berry's sign  Neurological: She is alert and oriented to person, place, and time  Skin: No rash noted  Psychiatric: She has a normal mood and affect   Her behavior is normal

## 2018-10-08 NOTE — PATIENT INSTRUCTIONS
Please make the diet changes as discussed  Can start re introducing foods one at a time after complete the medication / omeprazole  If get pain after eating the food then will need to avoid  as we discussed please complete the course of antibiotics for your throat  I have also sent a prescription for Diflucan tablet that can be used for the vaginal candidiasis  You are aware that the symptoms can continue as long as you are taking the antibiotics  Flu and tetanus vaccines updated today  Dieta para úlceras estomacales y gastritis   LO QUE NECESITA SABER:   ¿Qué es judith dieta para úlceras estomacales y gastritis? Judith dieta para úlceras y gastritis es un plan alimenticio que limita los alimentos que irritan bailey BJURHOLM  Ciertos alimentos [employer] síntomas xiomara dolor de Cliff GARCIA, acidez estomacal o indigestión  ¿Qué alimentos josiah limitar o evitar? Es posible que usted necesite evitar los alimentos ácidos, picantes o altos en grasa  No todos los alimentos afectan a las personas de la [address] [employer]  Usted necesitará aprender cuáles alimentos empeoran leatha síntomas y tendrá que limitar esos alimentos   Los siguientes son algunos alimentos que podrían empeorar judith úlcera o los síntomas de la gastritis:  · Bebidas:      ¨ Leche entera y [place] chocolatada    ¨ Chocolate caliente y refrescos de cola    ¨ Cualquier bebida con cafeína    ¨ Café regular y descafeinado    ¨ Té de hierbabuena y menta mic    ¨ Té mic o nito, regular o descafeinado    ¨ Jugos de [place] y toronja    ¨ Bebidas alcohólicas    · Especias y condimentos:      Sheree Hensley y norma    ¨ Polvo de chile    ¨ Semilla de mostaza y Elenita Sark moscada    · Otros alimentos:      ¨ Alimentos lácteos hechos de leche entera o crema    ¨ Chocolate    ¨ Quesos picantes o de sabor tatyana, xiomara de [employer] o kelvin jennifer    ¨ Carne con alto contenido de grasa y muy condimentada, xiomara [facility], [place], [place], [place] y embutidos    ¨ Chiles picantes    ¨ Productos derivados del tomate, xiomara pasta de [place], salsa o jugo del mismo  ¿Cuáles alimentos puedo consumir? Consuma judith variedad de alimentos saludables de todos los grupos alimenticios  Consuma frutas, verduras, granos enteros y productos lácteos descremados o bajos en grasa  Los granos [employer], los cereales, la pasta y el arroz integral  Elija la rozina Valenzuela, aves de guaman (angel y [place]), pescado, frijoles, huevos y carlos secos  Un plan alimenticio saludable tiene un contenido bajo de grasas no saludables, sal y azúcar  Las grasas saludables incluyen el aceite de latif y de canola  Solicite a berg dietista más información acerca de un plan alimenticio saludable  ¿Qué otras pautas podrían ser [place]? · No coma sanjay antes de acostarse  Deje de comer por lo menos 2 horas antes de acostarse  · Coma comidas pequeñas y con frecuencia  Es probable que berg estómago tolere mejor comidas pequeñas y frecuentes en vez de comidas grandes  ACUERDOS SOBRE BERG CUIDADO:   Usted tiene el derecho de ayudar a planear berg cuidado  Discuta leatha opciones de tratamiento con leatha médicos para decidir el cuidado que usted desea recibir  Usted siempre tiene el derecho de rechazar el tratamiento  Esta información es sólo para uso en educación  Berg intención no es darle un consejo médico sobre enfermedades o tratamientos  Colsulte con berg Jadyn Keith farmacéutico antes de seguir cualquier régimen médico para saber si es seguro y efectivo para usted  © 2017 [address] Information is for End User's use only and may not be sold, redistributed or otherwise used for commercial purposes  All illustrations and images included in CareNotes® are the copyrighted property of A D A M , Inc  or [employer]

## 2018-11-18 DIAGNOSIS — K21.9 GASTROESOPHAGEAL REFLUX DISEASE WITHOUT ESOPHAGITIS: ICD-10-CM

## 2018-11-18 RX ORDER — OMEPRAZOLE 20 MG/1
CAPSULE, DELAYED RELEASE ORAL
Qty: 15 CAPSULE | Refills: 0 | Status: SHIPPED | OUTPATIENT
Start: 2018-11-18 | End: 2018-12-06 | Stop reason: SDUPTHER

## 2018-11-27 ENCOUNTER — OFFICE VISIT (OUTPATIENT)
Dept: OBGYN CLINIC | Facility: CLINIC | Age: 28
End: 2018-11-27
Payer: COMMERCIAL

## 2018-11-27 VITALS
WEIGHT: 185 LBS | BODY MASS INDEX: 31.58 KG/M2 | HEART RATE: 96 BPM | HEIGHT: 64 IN | DIASTOLIC BLOOD PRESSURE: 88 MMHG | SYSTOLIC BLOOD PRESSURE: 127 MMHG

## 2018-11-27 DIAGNOSIS — N76.0 BV (BACTERIAL VAGINOSIS): ICD-10-CM

## 2018-11-27 DIAGNOSIS — B96.89 BV (BACTERIAL VAGINOSIS): ICD-10-CM

## 2018-11-27 DIAGNOSIS — Z11.3 SCREENING FOR STDS (SEXUALLY TRANSMITTED DISEASES): ICD-10-CM

## 2018-11-27 DIAGNOSIS — R31.9 HEMATURIA, UNSPECIFIED TYPE: ICD-10-CM

## 2018-11-27 DIAGNOSIS — N89.8 VAGINAL DISCHARGE: Primary | ICD-10-CM

## 2018-11-27 LAB
SL AMB  POCT GLUCOSE, UA: NEGATIVE
SL AMB LEUKOCYTE ESTERASE,UA: ABNORMAL
SL AMB POCT BILIRUBIN,UA: NEGATIVE
SL AMB POCT BLOOD,UA: ABNORMAL
SL AMB POCT CLARITY,UA: YELLOW
SL AMB POCT COLOR,UA: CLEAR
SL AMB POCT KETONES,UA: NEGATIVE
SL AMB POCT NITRITE,UA: NEGATIVE
SL AMB POCT PH,UA: 7
SL AMB POCT SPECIFIC GRAVITY,UA: 1.02
SL AMB POCT URINE PROTEIN: NEGATIVE
SL AMB POCT UROBILINOGEN: NEGATIVE

## 2018-11-27 PROCEDURE — 81002 URINALYSIS NONAUTO W/O SCOPE: CPT | Performed by: FAMILY MEDICINE

## 2018-11-27 PROCEDURE — 87491 CHLMYD TRACH DNA AMP PROBE: CPT | Performed by: FAMILY MEDICINE

## 2018-11-27 PROCEDURE — 99213 OFFICE O/P EST LOW 20 MIN: CPT | Performed by: FAMILY MEDICINE

## 2018-11-27 PROCEDURE — 87591 N.GONORRHOEAE DNA AMP PROB: CPT | Performed by: FAMILY MEDICINE

## 2018-11-27 PROCEDURE — 87086 URINE CULTURE/COLONY COUNT: CPT | Performed by: FAMILY MEDICINE

## 2018-11-27 NOTE — PROGRESS NOTES
Assessment/Plan:       Problem List Items Addressed This Visit     Vaginal discharge     Brown discharge x 5 weeks  LMP 11/7/2018 currently on OCP for a history of AUB  Wet mount positive for Clue   KOH negative   Rx Metronidazole 500 mg bid x7 days  GC/Chlamydia collected   F/U results  Suggest endometrial biopsy in the future given the h o DUB         Relevant Orders    Chlamydia/GC amplified DNA by PCR    Screening for STDs (sexually transmitted diseases)    Relevant Orders    Chlamydia/GC amplified DNA by PCR    Hematuria - Primary     Episodes of hematuria with clots reported  Urine dip in office positive for blood  Will send for for formal analysis  If more than than 3 hpf RBC on 2 separate UAs, will refer to Urology  Otherwise, I believe the RBCs are a result of BV          Relevant Orders    Urine culture    POCT urine dip (Completed)    BV (bacterial vaginosis)    Relevant Medications    metroNIDAZOLE (FLAGYL) 500 mg tablet            Subjective:      Patient ID: Jana Pina is a 33 y o  female  LMP 11/7/2018  Tubal ligation 2014  Currently on OCP for abnormal uterine bleeding  Vaginal Discharge   The patient's primary symptoms include genital itching, vaginal bleeding and vaginal discharge  The patient's pertinent negatives include no genital odor, missed menses or pelvic pain  This is a new problem  Episode onset: 5 weeks ago  The problem occurs daily  The problem has been unchanged  The patient is experiencing no pain  Associated symptoms include hematuria  Pertinent negatives include no abdominal pain, back pain, chills, diarrhea, dysuria or fever  The vaginal discharge was brown and thick  Nothing aggravates the symptoms  She is sexually active (2 partner in the last 12 months )  She uses oral contraceptives for contraception  Her menstrual history has been regular         The following portions of the patient's history were reviewed and updated as appropriate: allergies, current medications, past family history, past medical history, past social history, past surgical history and problem list     Review of Systems   Constitutional: Negative for chills and fever  Gastrointestinal: Negative for abdominal pain and diarrhea  Genitourinary: Positive for hematuria and vaginal discharge  Negative for dysuria, missed menses and pelvic pain  Musculoskeletal: Negative for back pain  Objective:      /88 (BP Location: Left arm, Patient Position: Sitting, Cuff Size: Adult)   Pulse 96   Ht 5' 4" (1 626 m)   Wt 83 9 kg (185 lb)   LMP 11/07/2018   BMI 31 76 kg/m²          Physical Exam   Constitutional: She appears well-developed and well-nourished  No distress  Neck: Neck supple  No JVD present  Cardiovascular: Normal rate, regular rhythm and normal heart sounds  No murmur heard  Pulmonary/Chest: Effort normal and breath sounds normal  No respiratory distress  She has no wheezes  She has no rales  Genitourinary: Vagina normal  Pelvic exam was performed with patient supine  There is no rash, tenderness or lesion on the right labia  There is no rash, tenderness or lesion on the left labia  Cervix exhibits discharge (brown discharge ) and friability  Lymphadenopathy:     She has no cervical adenopathy  Neurological: She is alert  Skin: Skin is warm

## 2018-11-28 PROBLEM — N76.0 BV (BACTERIAL VAGINOSIS): Status: ACTIVE | Noted: 2018-11-28

## 2018-11-28 PROBLEM — R31.9 HEMATURIA: Status: ACTIVE | Noted: 2018-11-28

## 2018-11-28 PROBLEM — B96.89 BV (BACTERIAL VAGINOSIS): Status: ACTIVE | Noted: 2018-11-28

## 2018-11-28 PROBLEM — B37.31 VAGINAL CANDIDIASIS: Status: RESOLVED | Noted: 2018-10-08 | Resolved: 2018-11-28

## 2018-11-28 PROBLEM — N89.8 VAGINAL DISCHARGE: Status: ACTIVE | Noted: 2018-11-28

## 2018-11-28 PROBLEM — B37.3 VAGINAL CANDIDIASIS: Status: RESOLVED | Noted: 2018-10-08 | Resolved: 2018-11-28

## 2018-11-28 PROBLEM — Z11.3 SCREENING FOR STDS (SEXUALLY TRANSMITTED DISEASES): Status: ACTIVE | Noted: 2018-11-28

## 2018-11-28 LAB — BACTERIA UR CULT: NORMAL

## 2018-11-28 RX ORDER — METRONIDAZOLE 500 MG/1
500 TABLET ORAL EVERY 12 HOURS SCHEDULED
Qty: 14 TABLET | Refills: 0 | Status: SHIPPED | OUTPATIENT
Start: 2018-11-28 | End: 2018-12-06 | Stop reason: ALTCHOICE

## 2018-11-28 NOTE — ASSESSMENT & PLAN NOTE
Brown discharge x 5 weeks  LMP 11/7/2018 currently on OCP for a history of AUB  Wet mount positive for Clue   KOH negative   Rx Metronidazole 500 mg bid x7 days  GC/Chlamydia collected   F/U results  Suggest endometrial biopsy in the future given the h o DUB

## 2018-11-28 NOTE — ASSESSMENT & PLAN NOTE
Episodes of hematuria with clots reported  Urine dip in office positive for blood  Will send for for formal analysis  If more than than 3 hpf RBC on 2 separate UAs, will refer to Urology    Otherwise, I believe the RBCs are a result of BV

## 2018-11-29 ENCOUNTER — OFFICE VISIT (OUTPATIENT)
Dept: URGENT CARE | Age: 28
End: 2018-11-29
Payer: COMMERCIAL

## 2018-11-29 VITALS
SYSTOLIC BLOOD PRESSURE: 133 MMHG | HEART RATE: 68 BPM | OXYGEN SATURATION: 100 % | HEIGHT: 64 IN | DIASTOLIC BLOOD PRESSURE: 77 MMHG | BODY MASS INDEX: 31.07 KG/M2 | RESPIRATION RATE: 16 BRPM | WEIGHT: 182 LBS

## 2018-11-29 DIAGNOSIS — M79.604 RIGHT LEG PAIN: Primary | ICD-10-CM

## 2018-11-29 LAB
C TRACH DNA SPEC QL NAA+PROBE: NEGATIVE
N GONORRHOEA DNA SPEC QL NAA+PROBE: NEGATIVE

## 2018-11-29 PROCEDURE — 99213 OFFICE O/P EST LOW 20 MIN: CPT | Performed by: FAMILY MEDICINE

## 2018-11-29 RX ORDER — NAPROXEN 500 MG/1
500 TABLET ORAL 2 TIMES DAILY WITH MEALS
Qty: 30 TABLET | Refills: 0 | Status: SHIPPED | OUTPATIENT
Start: 2018-11-29 | End: 2019-01-17 | Stop reason: ALTCHOICE

## 2018-11-29 NOTE — PATIENT INSTRUCTIONS
Rest, elevate right leg, limit activity  Stop ibuprofen (Motrin/Advil)  Naprosyn twice a day for pain and inflammation (please take with food)  Ice to painful areas 2 to 3 times a day for 15-20 minutes  Recheck/follow-up with family physician/Orthopedics as discussed  Pat Oh   [phone]    Please go to the hospital emergency department if needed

## 2018-12-06 ENCOUNTER — OFFICE VISIT (OUTPATIENT)
Dept: INTERNAL MEDICINE CLINIC | Facility: CLINIC | Age: 28
End: 2018-12-06
Payer: COMMERCIAL

## 2018-12-06 VITALS
HEART RATE: 72 BPM | WEIGHT: 182.98 LBS | BODY MASS INDEX: 32.42 KG/M2 | TEMPERATURE: 97.4 F | HEIGHT: 63 IN | DIASTOLIC BLOOD PRESSURE: 80 MMHG | SYSTOLIC BLOOD PRESSURE: 110 MMHG

## 2018-12-06 DIAGNOSIS — K59.09 OTHER CONSTIPATION: ICD-10-CM

## 2018-12-06 DIAGNOSIS — R51.9 CHRONIC DAILY HEADACHE: ICD-10-CM

## 2018-12-06 DIAGNOSIS — K21.9 GASTROESOPHAGEAL REFLUX DISEASE WITHOUT ESOPHAGITIS: Primary | ICD-10-CM

## 2018-12-06 PROCEDURE — 99213 OFFICE O/P EST LOW 20 MIN: CPT | Performed by: PHYSICIAN ASSISTANT

## 2018-12-06 RX ORDER — POLYETHYLENE GLYCOL 3350 17 G/17G
17 POWDER, FOR SOLUTION ORAL DAILY
Qty: 500 G | Refills: 1 | Status: SHIPPED | OUTPATIENT
Start: 2018-12-06 | End: 2019-01-17 | Stop reason: SDUPTHER

## 2018-12-06 RX ORDER — OMEPRAZOLE 20 MG/1
20 CAPSULE, DELAYED RELEASE ORAL DAILY
Qty: 30 CAPSULE | Refills: 0 | Status: SHIPPED | OUTPATIENT
Start: 2018-12-06 | End: 2019-01-17 | Stop reason: ALTCHOICE

## 2018-12-06 NOTE — PROGRESS NOTES
Assessment/Plan:    Gastroesophageal reflux disease without esophagitis  As we discussed today we are placing you on a full course of the omeprazole  Please take this once daily  Please also resume the ulcer free diet and I have reprinted this for you today  You are aware that eating does not shows and fried foods was likely the trigger of your episode this morning  Chronic daily headache  As we discussed today please discontinue all the over-the-counter medications such as the ibuprofen Motrin Advil and Aleve  Not only did these contribute to your ongoing stomach pain but taking them so frequently also can trigger a chronic rebound headache problem  Other constipation  As we discussed today the chronic use of laxatives is not advised  I have sent a prescription for MiraLax  Please use 1 cap full daily and I have also included in your information high fiber foods as well as increasing water  This will be re-evaluated at your follow-up appointment       Diagnoses and all orders for this visit:    Gastroesophageal reflux disease without esophagitis  -     Ambulatory referral to Gastroenterology; Future  -     polyethylene glycol (GLYCOLAX) powder; Take 17 g by mouth daily for 90 days  -     omeprazole (PriLOSEC) 20 mg delayed release capsule; Take 1 capsule (20 mg total) by mouth daily for 30 days  -     CBC and differential    Other constipation  -     Ambulatory referral to Gastroenterology; Future  -     polyethylene glycol (GLYCOLAX) powder; Take 17 g by mouth daily for 90 days    Chronic daily headache          Subjective:      Patient ID: Tre Pittman is a 33 y o  female  Pt here for episodic regarding epigastric abdominal pain, last visit 10/2018  At last visit, pt was rx omeprazole 20 mg x 15 days  Pt states she completed the course and the omeprazole helped, but she still reports burning epigastric pain 1-2 times per week   Pt states the burning pain starts in epigastric region and radiates around left flank  Pt states the pain normally lasts about an hour and is relieved with Tums  Pt reports the pain usually in the morning  Pt reports hot sauce and hot foods typically trigger  Pt admits to constipation, not having BM for 3 days at a time  Pt states takes Dulcolax every day for at least 1 year  Pt denies blood in stool or other changes in BM  Pt states she currently has burning epigastric pain that woke her from sleep and has been constant since 5am this morning  Pt states she tried taking Tums without relief  Today, she ate cereal and a banana with hot tea  Pt reports pain is worse when lying flat and sitting up  Pt states pain feels better when lying at 45 degree angle  Pt reports last meal last night was nachos with fried food meal but denies hot sauce or hot salsa  Pt reports pain sometimes goes away with food, but not today  Of note, pt reports taking naproxen 500 mg occasionally for knee pain 1-2 times per week, also taking ibuprofen 400 mg for headaches 2-3 times per week for many months  The following portions of the patient's history were reviewed and updated as appropriate: allergies, current medications, past family history, past medical history, past social history and past surgical history  Review of Systems   Constitutional: Negative  Negative for chills, fever and unexpected weight change  HENT: Negative  Negative for sore throat  Respiratory: Negative  Negative for cough and shortness of breath  Cardiovascular: Negative  Negative for chest pain  Gastrointestinal: Positive for abdominal pain and constipation  Negative for nausea and vomiting  Genitourinary: Negative  Musculoskeletal: Negative  Skin: Negative  Neurological: Positive for headaches  Negative for light-headedness  Psychiatric/Behavioral: Negative            Objective:      /80 (BP Location: Left arm, Patient Position: Sitting, Cuff Size: Standard)   Pulse 72   Temp (!) 97 4 °F (36 3 °C) (Oral)   Ht 5' 3" (1 6 m)   Wt 83 kg (182 lb 15 7 oz)   LMP 11/07/2018   BMI 32 41 kg/m²          Physical Exam   Constitutional: She is oriented to person, place, and time  She appears well-developed and well-nourished  No distress  HENT:   Head: Normocephalic and atraumatic  Cardiovascular: Normal rate, regular rhythm, normal heart sounds and intact distal pulses  Exam reveals no gallop and no friction rub  No murmur heard  Pulmonary/Chest: Effort normal and breath sounds normal  No respiratory distress  She has no wheezes  She has no rales  Abdominal: Soft  Bowel sounds are normal  There is tenderness (epigastric)  There is no rebound, no guarding, no tenderness at McBurney's point and negative Berry's sign  Neurological: She is alert and oriented to person, place, and time  Skin: Skin is warm and dry  She is not diaphoretic  Psychiatric: She has a normal mood and affect   Her behavior is normal

## 2018-12-06 NOTE — ASSESSMENT & PLAN NOTE
As we discussed today we are placing you on a full course of the omeprazole  Please take this once daily  Please also resume the ulcer free diet and I have reprinted this for you today  You are aware that eating does not shows and fried foods was likely the trigger of your episode this morning

## 2018-12-06 NOTE — ASSESSMENT & PLAN NOTE
As we discussed today please discontinue all the over-the-counter medications such as the ibuprofen Motrin Advil and Aleve  Not only did these contribute to your ongoing stomach pain but taking them so frequently also can trigger a chronic rebound headache problem

## 2018-12-06 NOTE — ASSESSMENT & PLAN NOTE
As we discussed today the chronic use of laxatives is not advised  I have sent a prescription for MiraLax  Please use 1 cap full daily and I have also included in your information high fiber foods as well as increasing water    This will be re-evaluated at your follow-up appointment

## 2018-12-06 NOTE — PATIENT INSTRUCTIONS
Restart the omeprazole once a day  Stop taking NSAID as this can cause your stomach pain as well as your chronic headaches  Need to follow all of the diet to help improve abdominal pain  Start the Miralax once a day to help with constipation but also increase high fiber foods and water, stop the daily use of laxitives  Sched follow up with GI as pain not improved      Migraine Headache   AMBULATORY CARE:   A migraine headache  is a severe headache  The pain can be so severe that it interferes with your daily activities  A migraine can last a few hours up to several days  The exact cause of migraines is not known  Common triggers for a migraine include the following:   · Stress, eye strain, oversleeping, or not getting enough sleep    · Hormone changes in women from birth control pills, pregnancy, menopause, or during a monthly period    · Skipping meals, going too long without eating, or not drinking enough liquids    · Certain foods or drinks such as chocolate, hard cheese, red wine, or drinks that contain caffeine    · Foods that contain gluten, nitrates, MSG, or artificial sweeteners    · Sunlight, bright or flashing lights, loud noises, smoke, or strong smells    · Heat, humidity, or changes in the weather  Common warning signs include the following:  Warning signs usually start 15 to 60 minutes before the headache:  · Visual changes (auras), such as blurred vision, temporary blind or bright spots, lines, or hallucinations    · Unusual tiredness or frequent yawning    · Tingling in an arm or leg  Seek care immediately if:   · You have a headache that seems different or much worse than your usual migraine headache  · You have a severe headache with a fever or a stiff neck  · You have new problems with speech, vision, balance, or movement  · You feel like you are going to faint, you become confused, or you have a seizure    Contact your healthcare provider or neurologist if:   · You have a fever     · Your migraines interfere with your daily activities  · Your medicines or treatments stop working  · You have questions about your condition or care  Treatment:  Migraines cannot be cured  The goal of treatment is to reduce your symptoms  Take medicine as soon as you feel a migraine begin  · Prescription pain medicine  may be given  Do not wait until the pain is severe before you take your medicine  · Migraine medicines  are used to help prevent a migraine or stop it once it starts  · Antinausea medicine  may be given to calm your stomach and to help prevent vomiting  This medicine can also help relieve pain  Manage your symptoms:   · Rest in a dark, quiet room  This will help decrease your pain  Sleep may also help relieve the pain  · Apply ice to decrease pain  Use an ice pack, or put crushed ice in a plastic bag  Cover the ice pack with a towel and place it on your head where it hurts for 15 to 20 minutes every hour  · Apply heat to decrease pain and muscle spasms  Use a small towel dampened with warm water or a heating pad, or sit in a warm bath  Apply heat on the area for 20 to 30 minutes every 2 hours  You may alternate heat and ice  · Keep a migraine record  Write down when your migraines start and stop  Include your symptoms and what you were doing when a migraine began  Record what you ate or drank for 24 hours before the migraine started  Keep track of what you did to treat your migraine and if it worked  Follow up with your healthcare provider as directed:  Bring your migraine record with you  Write down your questions so you remember to ask them during your visits  Prevent another migraine headache:   · Do not smoke  Nicotine and other chemicals in cigarettes and cigars can trigger a migraine and also cause lung damage  Ask your healthcare provider for information if you currently smoke and need help to quit   E-cigarettes or smokeless tobacco still contain nicotine  Talk to your healthcare provider before you use these products  · Do not drink alcohol  Alcohol can trigger a migraine  It can also interfere with the medicines used to treat your migraine  · Exercise regularly  Ask about the best exercise plan for you  · Manage stress  Stress may trigger a migraine  Learn new ways to relax, such as deep breathing  · Follow a sleep schedule  Go to bed and get up at the same time each day  · Eat a variety of healthy foods  Healthy foods include fruit, vegetables, whole-grain breads, low-fat dairy products, beans, lean meats, and fish  Do not have foods or drinks that trigger your migraines  © 2017 [address] [facility] Information is for End User's use only and may not be sold, redistributed or otherwise used for commercial purposes  All illustrations and images included in CareNotes® are the copyrighted property of [employer]  or [employer]  The above information is an  only  It is not intended as medical advice for individual conditions or treatments  Talk to your doctor, nurse or pharmacist before following any medical regimen to see if it is safe and effective for you  High Fiber Diet   AMBULATORY CARE:   A high-fiber diet  includes foods that have a high amount of fiber  Fiber is the part of fruits, vegetables, and grains that is not broken down by your body  Fiber keeps your bowel movements regular  Fiber can also help lower your cholesterol level, control blood sugar in people with diabetes, and relieve constipation  Fiber can also help you control your weight because it helps you feel full faster  Most adults should eat 25 to 35 grams of fiber each day  Talk to your dietitian or healthcare provider about the amount of fiber you need    Good sources of fiber:   · Foods with at least 4 grams of fiber per serving:      ¨ ? to ½ cup of high-fiber cereal (check the nutrition label on the box)    ¨ ½ cup of blackberries or raspberries    ¨ 4 dried prunes    ¨ 1 cooked artichoke    ¨ ½ cup of cooked legumes, such as lentils, or red, kidney, and sellers beans    · Foods with 1 to 3 grams of fiber per serving:      ¨ 1 slice of whole-wheat, pumpernickel, or rye bread    ¨ ½ cup of cooked brown rice    ¨ 4 whole-wheat crackers    ¨ 1 cup of oatmeal    ¨ ½ cup of cereal with 1 to 3 grams of fiber per serving (check the nutrition label on the box)    ¨ 1 small piece of fruit, such as an apple, banana, pear, kiwi, or orange    ¨ 3 dates    ¨ ½ cup of canned apricots, fruit cocktail, peaches, or pears    ¨ ½ cup of raw or cooked vegetables, such as carrots, cauliflower, cabbage, spinach, squash, or corn  Ways that you can increase fiber in your diet:   · Choose brown or wild rice instead of white rice  · Use whole wheat flour in recipes instead of white or all-purpose flour  · Add beans and peas to casseroles or soups  · Choose fresh fruit and vegetables with peels or skins on instead of juices  Other diet guidelines to follow:   · Add fiber to your diet slowly  You may have abdominal discomfort, bloating, and gas if you add fiber to your diet too quickly  · Drink plenty of liquids as you add fiber to your diet  You may have nausea or develop constipation if you do not drink enough water  Ask how much liquid to drink each day and which liquids are best for you  © 2017 [address] Information is for End User's use only and may not be sold, redistributed or otherwise used for commercial purposes  All illustrations and images included in CareNotes® are the copyrighted property of [employer]  or [employer]  The above information is an  only  It is not intended as medical advice for individual conditions or treatments  Talk to your doctor, nurse or pharmacist before following any medical regimen to see if it is safe and effective for you    Diet for Stomach Ulcers and Gastritis   WHAT YOU NEED TO KNOW:   What is a diet for stomach ulcers and gastritis? A diet for ulcers and gastritis is a meal plan that limits foods that irritate your stomach  Certain foods may worsen symptoms such as stomach pain, bloating, heartburn, or indigestion  Which foods should I limit or avoid? You may need to avoid acidic, spicy, or high-fat foods  Not all foods affect everyone the same way  You will need to learn which foods worsen your symptoms and limit those foods  The following are some foods that may worsen ulcer or gastritis symptoms:  · Beverages:      ¨ Whole milk and chocolate milk    ¨ Hot cocoa and cola    ¨ Any beverage with caffeine    ¨ Regular and decaffeinated coffee    ¨ Peppermint and spearmint tea    ¨ Green and black tea, with or without caffeine    ¨ Orange and grapefruit juices    ¨ Drinks that contain alcohol    · Spices and seasonings:      ¨ Black and red pepper    ¨ Chili powder    ¨ Mustard seed and nutmeg    · Other foods:      ¨ Dairy foods made from whole milk or cream    ¨ Chocolate    ¨ Spicy or strongly flavored cheeses, such as jalapeno or black pepper    ¨ Highly seasoned, high-fat meats, such as sausage, salami, thompson, ham, and cold cuts    ¨ Hot chiles and peppers    ¨ Tomato products, such as tomato paste, tomato sauce, or tomato juice  Which foods can I eat and drink? Eat a variety of healthy foods from all the food groups  Eat fruits, vegetables, whole grains, and fat-free or low-fat dairy foods  Whole grains include whole-wheat breads, cereals, pasta, and brown rice  Choose lean meats, poultry (chicken and turkey), fish, beans, eggs, and nuts  A healthy meal plan is low in unhealthy fats, salt, and added sugar  Healthy fats include olive oil and canola oil  Ask your dietitian for more information about a healthy meal plan  What other guidelines may be helpful? · Do not eat right before bedtime  Stop eating at least 2 hours before bedtime      · Eat small, frequent meals  Your stomach may tolerate small, frequent meals better than large meals  CARE AGREEMENT:   You have the right to help plan your care  Discuss treatment options with your caregivers to decide what care you want to receive  You always have the right to refuse treatment  The above information is an  only  It is not intended as medical advice for individual conditions or treatments  Talk to your doctor, nurse or pharmacist before following any medical regimen to see if it is safe and effective for you  © 2017 [address] [facility] Information is for End User's use only and may not be sold, redistributed or otherwise used for commercial purposes  All illustrations and images included in CareNotes® are the copyrighted property of [employer]  or [employer]

## 2018-12-11 ENCOUNTER — APPOINTMENT (OUTPATIENT)
Dept: LAB | Age: 28
End: 2018-12-11
Payer: COMMERCIAL

## 2018-12-11 ENCOUNTER — TRANSCRIBE ORDERS (OUTPATIENT)
Dept: ADMINISTRATIVE | Age: 28
End: 2018-12-11

## 2018-12-11 LAB
BASOPHILS # BLD AUTO: 0.07 THOUSANDS/ΜL (ref 0–0.1)
BASOPHILS NFR BLD AUTO: 1 % (ref 0–1)
EOSINOPHIL # BLD AUTO: 0.23 THOUSAND/ΜL (ref 0–0.61)
EOSINOPHIL NFR BLD AUTO: 3 % (ref 0–6)
ERYTHROCYTE [DISTWIDTH] IN BLOOD BY AUTOMATED COUNT: 12.4 % (ref 11.6–15.1)
HCT VFR BLD AUTO: 39.9 % (ref 34.8–46.1)
HGB BLD-MCNC: 13.1 G/DL (ref 11.5–15.4)
IMM GRANULOCYTES # BLD AUTO: 0.02 THOUSAND/UL (ref 0–0.2)
IMM GRANULOCYTES NFR BLD AUTO: 0 % (ref 0–2)
LYMPHOCYTES # BLD AUTO: 1.9 THOUSANDS/ΜL (ref 0.6–4.47)
LYMPHOCYTES NFR BLD AUTO: 28 % (ref 14–44)
MCH RBC QN AUTO: 29.8 PG (ref 26.8–34.3)
MCHC RBC AUTO-ENTMCNC: 32.8 G/DL (ref 31.4–37.4)
MCV RBC AUTO: 91 FL (ref 82–98)
MONOCYTES # BLD AUTO: 0.49 THOUSAND/ΜL (ref 0.17–1.22)
MONOCYTES NFR BLD AUTO: 7 % (ref 4–12)
NEUTROPHILS # BLD AUTO: 4.12 THOUSANDS/ΜL (ref 1.85–7.62)
NEUTS SEG NFR BLD AUTO: 61 % (ref 43–75)
NRBC BLD AUTO-RTO: 0 /100 WBCS
PLATELET # BLD AUTO: 311 THOUSANDS/UL (ref 149–390)
PMV BLD AUTO: 9.9 FL (ref 8.9–12.7)
RBC # BLD AUTO: 4.39 MILLION/UL (ref 3.81–5.12)
WBC # BLD AUTO: 6.83 THOUSAND/UL (ref 4.31–10.16)

## 2018-12-11 PROCEDURE — 36415 COLL VENOUS BLD VENIPUNCTURE: CPT | Performed by: PHYSICIAN ASSISTANT

## 2018-12-11 PROCEDURE — 85025 COMPLETE CBC W/AUTO DIFF WBC: CPT | Performed by: PHYSICIAN ASSISTANT

## 2019-01-17 ENCOUNTER — OFFICE VISIT (OUTPATIENT)
Dept: INTERNAL MEDICINE CLINIC | Facility: CLINIC | Age: 29
End: 2019-01-17

## 2019-01-17 VITALS
SYSTOLIC BLOOD PRESSURE: 118 MMHG | BODY MASS INDEX: 32.97 KG/M2 | TEMPERATURE: 97.2 F | HEIGHT: 63 IN | HEART RATE: 80 BPM | WEIGHT: 186.07 LBS | DIASTOLIC BLOOD PRESSURE: 88 MMHG

## 2019-01-17 DIAGNOSIS — R51.9 CHRONIC DAILY HEADACHE: Primary | ICD-10-CM

## 2019-01-17 DIAGNOSIS — K59.09 OTHER CONSTIPATION: ICD-10-CM

## 2019-01-17 DIAGNOSIS — K21.9 GASTROESOPHAGEAL REFLUX DISEASE WITHOUT ESOPHAGITIS: ICD-10-CM

## 2019-01-17 DIAGNOSIS — M54.31 RIGHT SCIATIC NERVE PAIN: ICD-10-CM

## 2019-01-17 DIAGNOSIS — M62.838 TRAPEZIUS MUSCLE SPASM: ICD-10-CM

## 2019-01-17 PROBLEM — N76.0 BV (BACTERIAL VAGINOSIS): Status: RESOLVED | Noted: 2018-11-28 | Resolved: 2019-01-17

## 2019-01-17 PROBLEM — Z11.3 SCREENING FOR STDS (SEXUALLY TRANSMITTED DISEASES): Status: RESOLVED | Noted: 2018-11-28 | Resolved: 2019-01-17

## 2019-01-17 PROBLEM — N89.8 VAGINAL DISCHARGE: Status: RESOLVED | Noted: 2018-11-28 | Resolved: 2019-01-17

## 2019-01-17 PROBLEM — B96.89 BV (BACTERIAL VAGINOSIS): Status: RESOLVED | Noted: 2018-11-28 | Resolved: 2019-01-17

## 2019-01-17 PROBLEM — J02.8 ACUTE PHARYNGITIS DUE TO OTHER SPECIFIED ORGANISMS: Status: RESOLVED | Noted: 2018-10-08 | Resolved: 2019-01-17

## 2019-01-17 PROCEDURE — 99214 OFFICE O/P EST MOD 30 MIN: CPT | Performed by: INTERNAL MEDICINE

## 2019-01-17 RX ORDER — POLYETHYLENE GLYCOL 3350 17 G/17G
17 POWDER, FOR SOLUTION ORAL DAILY
Qty: 500 G | Refills: 1 | Status: SHIPPED | OUTPATIENT
Start: 2019-01-17 | End: 2019-09-14

## 2019-01-17 RX ORDER — METHOCARBAMOL 750 MG/1
750 TABLET, FILM COATED ORAL
Qty: 20 TABLET | Refills: 0 | Status: SHIPPED | OUTPATIENT
Start: 2019-01-17 | End: 2019-04-11 | Stop reason: ALTCHOICE

## 2019-01-17 RX ORDER — TOPIRAMATE 25 MG/1
CAPSULE, COATED PELLETS ORAL
Qty: 60 CAPSULE | Refills: 2 | Status: SHIPPED | OUTPATIENT
Start: 2019-01-17 | End: 2019-03-01 | Stop reason: SINTOL

## 2019-01-17 NOTE — PROGRESS NOTES
Assessment/Plan:    No problem-specific Assessment & Plan notes found for this encounter  Diagnoses and all orders for this visit:    Chronic daily headache  -     topiramate (TOPAMAX) 25 mg sprinkle capsule; Take 1 tablet daily at bedtime for 2 weeks then increase to 2 tablets together at bedtime    Right sciatic nerve pain  -     methocarbamol (ROBAXIN) 750 mg tablet; Take 1 tablet (750 mg total) by mouth daily at bedtime as needed for muscle spasms for up to 20 days  -     Ambulatory referral to Physical Therapy; Future    Gastroesophageal reflux disease without esophagitis  -     polyethylene glycol (GLYCOLAX) powder; Take 17 g by mouth daily for 90 days    Other constipation  -     polyethylene glycol (GLYCOLAX) powder; Take 17 g by mouth daily for 90 days    Trapezius muscle spasm  -     Ambulatory referral to Physical Therapy; Future          Subjective:      Patient ID: Jon Moeller is a 33 y o  female  Pt here for follow up  Patient reports she is no longer having any of the epigastric or reflux symptoms  Patient realizes she now understands from our visit and discontinuing all of the nonsteroidals that this was likely the cause of her abdominal pain  Patient completed the full course of PPI and has not had any return of symptoms at this time  Patient also confirm she has stopped the over-the-counter laxatives and has been using the MiraLax daily  She is very happy with the outcome and reports no longer having any constipation  She reports she is having regular bowel movements without straining  We discussed that she is had significant improvement and in fact resolution of most of the symptoms she does not need to keep the appointment with the gastroenterologist   We did discuss that should symptoms return or any new symptoms these would be evaluated and referrals as needed  Still having the headaches 3-4 x a week    Patient is aware as we discussed that she would likely have increased frequency of headaches for little while after stopping all of the over-the-counter medications  She was aware it was important to stop these as these were not only causing rebound headaches but also her stomach pain  Patient confirms the headache can be throbbing very often left-sided or right-sided temporal   Patient notes some white spots with vision associated with nausea with onset of headache  Patient however has not noticed any neurologic symptoms prior to the onset of the headache  Reviewed with patient due to the frequency of her headaches that a medication that she would take for the headaches right away would not be appropriate as would be to frequent  We reviewed that we will be starting her on a once a day medication and that this would need to be taken every day in which to get better control of her chronic headaches  We also reviewed that this will take a number of weeks to a few months to get full control  Patient also reporting ongoing left trapezius pain however has now developed a pain in right sciatic  Patient works in a [employer] as a  with a lot of walking squatting and bending  Patient denying any significant low back pain but does get some right lower back pain however most of the pain is from the sciatic arch extending into her right leg a stopping before her knee  Patient denies any changes to bowel or bladder function, patient denies any weakness to lower extremities  Pain is made worse by frequent bending and walking  The following portions of the patient's history were reviewed and updated as appropriate: allergies, current medications, past family history, past medical history, past social history, past surgical history and problem list     Review of Systems   Constitutional: Negative  Negative for unexpected weight change  HENT: Negative  Eyes: Positive for photophobia and visual disturbance          With onset of migraine   Respiratory: Negative  Negative for cough and shortness of breath  Cardiovascular: Negative  Negative for chest pain and palpitations  Gastrointestinal: Negative for abdominal pain, constipation and diarrhea  Endocrine: Negative  Genitourinary: Negative  Musculoskeletal: Positive for back pain and myalgias  Negative for joint swelling and neck pain  Skin: Negative for rash  Neurological: Positive for headaches  Negative for tremors, weakness and numbness  As noted in HPI pain radiating into R thigh   Psychiatric/Behavioral: Negative  Objective:      /88 (BP Location: Left arm, Patient Position: Sitting, Cuff Size: Adult)   Pulse 80   Temp (!) 97 2 °F (36 2 °C) (Oral)   Ht 5' 3" (1 6 m)   Wt 84 4 kg (186 lb 1 1 oz)   BMI 32 96 kg/m²          Physical Exam   Constitutional: She is oriented to person, place, and time  She appears well-developed and well-nourished  HENT:   Head: Normocephalic and atraumatic  Eyes: Pupils are equal, round, and reactive to light  Conjunctivae and EOM are normal  Right eye exhibits no nystagmus  Left eye exhibits no nystagmus  Neck: Normal range of motion  Neck supple  Cardiovascular: Normal rate, regular rhythm and normal heart sounds  Pulmonary/Chest: Effort normal and breath sounds normal    Abdominal: Bowel sounds are normal  There is no tenderness  There is no rebound  Musculoskeletal: She exhibits tenderness  She exhibits no edema  Lumbar back: She exhibits decreased range of motion and tenderness  She exhibits no bony tenderness and no deformity  Back:    Bilateral lower extremity strength 5/5  Deep tendon reflexes intact  Positive tenderness to palpation left greater than right trapezius  Cervical full range of motion without reproduction of pain  Bilateral upper extremities full range of motion  You do report some increased pain to left trapezius with full flexion and abduction of left upper extremity    Bilateral upper extremity strength 5/5   Neurological: She is alert and oriented to person, place, and time  She displays normal reflexes  No cranial nerve deficit  She exhibits normal muscle tone  Skin: Skin is warm and dry  No rash noted  Psychiatric: She has a normal mood and affect   Her behavior is normal  Thought content normal

## 2019-01-17 NOTE — PATIENT INSTRUCTIONS
Please continue the MiraLax once daily as this has significantly improved your constipation and no longer report having any issues with bowel movements  You also report your epigastric abdominal pain and reflux symptoms have resolved once you stop taking the daily over-the-counter NSAIDs  You are aware that should symptoms return to call for re-evaluation  As we discussed today for your migraines we are starting you on a medication called Topamax  We reviewed that you will start with 25 mg once daily at bedtime for 2 weeks then increase to 2 tablets together for 50 mg at bedtime  We will schedule a follow-up in a few weeks to re-evaluate your headaches on this new medication  If you develop any new symptoms or worsening of headaches please call for re-evaluation  We discussed as you have had a return of your left trapezius pain as well as a new complaint of right leg pain that radiates from right lower back into your right thigh we will be providing you with a referral for physical therapy  We discussed that this is important to not only help the pain that you are having now but also to help prevent you from getting the pain again  We discussed it will be very important to complete the full course and to continue all home exercise programs  I have sent a prescription for medication that you may take at bedtime for the muscle spasms and aware that initially at physical therapy you may have a slight increase in pain

## 2019-01-30 ENCOUNTER — APPOINTMENT (OUTPATIENT)
Dept: PHYSICAL THERAPY | Age: 29
End: 2019-01-30
Payer: COMMERCIAL

## 2019-02-18 ENCOUNTER — EVALUATION (OUTPATIENT)
Dept: PHYSICAL THERAPY | Age: 29
End: 2019-02-18
Payer: COMMERCIAL

## 2019-02-18 DIAGNOSIS — M54.31 RIGHT SCIATIC NERVE PAIN: ICD-10-CM

## 2019-02-18 DIAGNOSIS — M62.838 TRAPEZIUS MUSCLE SPASM: ICD-10-CM

## 2019-02-18 PROCEDURE — 97162 PT EVAL MOD COMPLEX 30 MIN: CPT | Performed by: PHYSICAL THERAPIST

## 2019-02-18 PROCEDURE — G8991 OTHER PT/OT GOAL STATUS: HCPCS | Performed by: PHYSICAL THERAPIST

## 2019-02-18 PROCEDURE — G8990 OTHER PT/OT CURRENT STATUS: HCPCS | Performed by: PHYSICAL THERAPIST

## 2019-02-18 NOTE — PROGRESS NOTES
PT Evaluation     Today's date: 2019  Patient name: Sandra Duran  : 1990  MRN: [id number]  Referring provider: Yeimy Dailey PA-C  Dx:   Encounter Diagnosis     ICD-10-CM    1  Right sciatic nerve pain M54 31 Ambulatory referral to Physical Therapy   2  Trapezius muscle spasm M62 838 Ambulatory referral to Physical Therapy                  Assessment  Assessment details: 33year old female patient reports to PT with R LE pain and L upper trap pain  LQS was unremarkable  Neural tension tests were positive for reproduction of some of patient's R LE  Patient's R ASIS was also anteriorly rotated compared to her L, which could be contributing to patient's symptoms, and two SI provocation tests were positive, indicating patient's symptoms could be originating from her SI joint  Directional preference was negative  Patient's L upper trap pain seems to be correlated with L shoulder weakness as patient had moderately limited shoulder AROM, but had full PROM, and was also weak with proximal shoulder and scapular strength  Patient will benefit from skilled PT services to address current impairments and functional limitations to help patient return to her PLOF  Impairments: abnormal or restricted ROM, abnormal movement, activity intolerance, impaired physical strength and pain with function    Symptom irritability: moderateUnderstanding of Dx/Px/POC: good   Prognosis: fair  Prognosis details: Chronicity     Goals  STG  1  Patient will be independent with completion of HEP throughout therapy  2  Patient will have at worst 7/10 pain so patient can sleep with less discomfort in 3 weeks  LTG  1  Patient will increase R shoulder AROM by at least 50% so patient can lift her R UE overhead with less difficulty in 4 weeks  2  Patient will increase R shoulder proximal strength by at least 1/2 grade so patient can lift with less difficulty in 6 weeks    3  Patient will stand for prolonged periods so patient can complete household tasks with less difficulty in 6 weeks  4  Patient will ambulate for prolonged periods so patient can navigate throughout the community with less difficulty in 6 weeks  Plan  Patient would benefit from: skilled physical therapy  Planned therapy interventions: abdominal trunk stabilization, joint mobilization, manual therapy, neuromuscular re-education, patient education, strengthening, stretching, therapeutic activities, therapeutic exercise, home exercise program, flexibility, functional ROM exercises and body mechanics training  Frequency: 2x week  Duration in weeks: 6  Treatment plan discussed with: patient        Subjective Evaluation    History of Present Illness  Mechanism of injury: Patient reports with R LE pain that goes down to the back of her knee about 2 months ago  Patient denies numbness/tingling, saddle anesthesia, progressive weakness, bowel/bladder issues  Patient notes she only has it when she stands or walks for greater than 20 minutes  Patient also has some trouble sleeping due to her symptoms  Patient also reports with L upper trap pain that is chronic  Patient also notes she gets headaches into her temporal region when her L upper trap pain is bad  Patient denies dizziness, dexterity issues, balance deficits  Patient notes she has difficulty with overhead activity due to her symptoms  Pain  Current pain ratin  At best pain ratin  At worst pain rating: 10  Quality: burning, dull ache and sharp  Relieving factors: change in position  Aggravating factors: walking, standing and lifting    Treatments  Current treatment: physical therapy  Patient Goals  Patient goals for therapy: decreased pain          Objective     Concurrent Complaints  Positive for disturbed sleep  Negative for night pain, bladder dysfunction, bowel dysfunction and saddle (S4) numbness    Palpation   Left   Tenderness of the levator scapulae and upper trapezius  Neurological Testing     Sensation     Lumbar   Left   Intact: light touch    Right   Intact: light touch    Reflexes   Left   Patellar (L4): normal (2+)  Achilles (S1): normal (2+)  Clonus sign: negative    Right   Patellar (L4): normal (2+)  Achilles (S1): normal (2+)  Clonus sign: negative    Active Range of Motion   Cervical/Thoracic Spine       Cervical    Flexion:  WFL and with pain  Extension: 30 degrees     with pain  Left rotation: 60 degrees with pain  Right rotation: 60 degrees    with pain  Left Shoulder   Flexion: 120 degrees with pain  Abduction: 90 degrees with pain    Right Shoulder   Normal active range of motion    Lumbar   Flexion:  WFL and with pain  Extension: 30 degrees     Passive Range of Motion   Left Shoulder   Normal passive range of motion    Right Shoulder   Normal passive range of motion    Strength/Myotome Testing     Left Shoulder     Planes of Motion   External rotation at 0°: 3+   Internal rotation at 0°: 4-     Isolated Muscles   Lower trapezius: 3+   Middle trapezius: 3+   Serratus anterior: 3+     Right Shoulder     Planes of Motion   External rotation at 0°: 4   Internal rotation at 0°: 4     Isolated Muscles   Lower trapezius: 3+   Middle trapezius: 3+   Serratus anterior: 3+     Left Hip   Planes of Motion   Flexion: 4  Extension: 3+    Right Hip   Planes of Motion   Flexion: 4  Extension: 3+    Left Knee   Flexion: 4  Extension: 4    Right Knee   Flexion: 4  Extension: 4    Left Ankle/Foot   Dorsiflexion: 4  Great toe extension: 4    Right Ankle/Foot   Dorsiflexion: 4  Great toe extension: 4    Tests     Left Shoulder   Positive empty can and Hawkin's  Negative drop arm and external rotation lag sign  Lumbar   Positive sacroiliac distraction  and sacral spring   Left   Negative crossed SLR, passive SLR and slump test      Right   Positive passive SLR and slump test    Negative crossed SLR       Additional Tests Details  Scours negative B     General Comments:      Lumbar Comments  No directional preference noted during objective testing      Flowsheet Rows      Most Recent Value   PT/OT G-Codes   Current Score  60   Projected Score  67   FOTO information reviewed  Yes   Assessment Type  Evaluation   G code set  Other PT/OT Primary   Other PT Primary Current Status ()  CK   Other PT Primary Goal Status ()  CJ          Precautions: none     Daily Treatment Diary     Manual                                                     Assess ASLR test                               Exercise Diary              Pelvic muscle energy for R anterior              bridges             Supine R nerve glides                          Recumbent bike             SLR             Ab marching                                                                              sidelying shoulder ER L             Prone low trap retraction L             Wall slides L             Supine serratus punches             No moneys                                                         Modalities              MH to L UT during bike maybe

## 2019-02-21 ENCOUNTER — OFFICE VISIT (OUTPATIENT)
Dept: PHYSICAL THERAPY | Age: 29
End: 2019-02-21
Payer: COMMERCIAL

## 2019-02-21 DIAGNOSIS — M62.838 TRAPEZIUS MUSCLE SPASM: ICD-10-CM

## 2019-02-21 DIAGNOSIS — M54.31 RIGHT SCIATIC NERVE PAIN: Primary | ICD-10-CM

## 2019-02-21 PROCEDURE — 97110 THERAPEUTIC EXERCISES: CPT | Performed by: PHYSICAL THERAPIST

## 2019-02-21 PROCEDURE — 97112 NEUROMUSCULAR REEDUCATION: CPT | Performed by: PHYSICAL THERAPIST

## 2019-02-21 PROCEDURE — 97140 MANUAL THERAPY 1/> REGIONS: CPT | Performed by: PHYSICAL THERAPIST

## 2019-02-21 NOTE — PROGRESS NOTES
Daily Note     Today's date: 2019  Patient name: Joy Landon  : 1990  MRN: [id number]  Referring provider: Jeanna Delvalle PA-C  Dx:   Encounter Diagnosis     ICD-10-CM    1  Right sciatic nerve pain M54 31    2  Trapezius muscle spasm M62 838                   Subjective: Patient notes neck symptoms same since IE, but R LE symptoms are better  Patient hasn't had R LE symptoms since IE  Objective: See treatment diary below    Precautions: none      Daily Treatment Diary      Manual                        L cervical side glides  5 min Gr III/IV lower                      graston to L UT*                                               Assess ASLR test                                                      Exercise Diary                        Pelvic muscle energy for R anterior   5x5" holds                      bridges  2x12                     Supine R nerve glides                                               Recumbent bike  8 min /w MH to L shoulder                     SLR  2x10                      Ab marching  2x10                                                                                                                                              sidelying shoulder ER L  2x12                     Prone low trap retraction L  2x12                      Wall slides L  10x, 5x w/ LT activation                      Supine serratus punches  2x10                      No moneys   2x10                       ball on wall   20x 2 ways                                                                           Modalities                        MH to L UT during bike Did see tx                                                                            Assessment: Tolerated treatment fair   Patient L UT symptoms seem to be most contributed from a muscular oriign, as during side glides patient was having increased L sided headache, and then with palpation to L UT, patient symptoms were also reproduced  Next visit trial IASTM to L UT  Patient had decrease in symptoms during wall slides when cued to activate low trap during elevation  Next visit warm up on treadmill as recumbent bike increased patient's R knee pain  Plan: Progress treatment as tolerated

## 2019-02-27 ENCOUNTER — OFFICE VISIT (OUTPATIENT)
Dept: PHYSICAL THERAPY | Age: 29
End: 2019-02-27
Payer: COMMERCIAL

## 2019-02-27 DIAGNOSIS — M54.31 RIGHT SCIATIC NERVE PAIN: Primary | ICD-10-CM

## 2019-02-27 DIAGNOSIS — M62.838 TRAPEZIUS MUSCLE SPASM: ICD-10-CM

## 2019-02-27 PROCEDURE — 97110 THERAPEUTIC EXERCISES: CPT | Performed by: PHYSICAL THERAPIST

## 2019-02-27 PROCEDURE — 97140 MANUAL THERAPY 1/> REGIONS: CPT | Performed by: PHYSICAL THERAPIST

## 2019-02-27 PROCEDURE — 97112 NEUROMUSCULAR REEDUCATION: CPT | Performed by: PHYSICAL THERAPIST

## 2019-02-28 ENCOUNTER — OFFICE VISIT (OUTPATIENT)
Dept: PHYSICAL THERAPY | Age: 29
End: 2019-02-28
Payer: COMMERCIAL

## 2019-02-28 DIAGNOSIS — M62.838 TRAPEZIUS MUSCLE SPASM: ICD-10-CM

## 2019-02-28 DIAGNOSIS — M54.31 RIGHT SCIATIC NERVE PAIN: Primary | ICD-10-CM

## 2019-02-28 PROCEDURE — 97112 NEUROMUSCULAR REEDUCATION: CPT | Performed by: PHYSICAL THERAPIST

## 2019-02-28 PROCEDURE — 97110 THERAPEUTIC EXERCISES: CPT | Performed by: PHYSICAL THERAPIST

## 2019-02-28 PROCEDURE — 97140 MANUAL THERAPY 1/> REGIONS: CPT | Performed by: PHYSICAL THERAPIST

## 2019-02-28 NOTE — PROGRESS NOTES
Daily Note     Today's date: 2019  Patient name: Senait Hernandez  : 1990  MRN: [id number]  Referring provider: Issa Hernandez PA-C  Dx:   Encounter Diagnosis     ICD-10-CM    1  Right sciatic nerve pain M54 31    2  Trapezius muscle spasm M62 838                   Subjective: Patient notes no issues since yesterday's treatment      Objective: See treatment diary below    Precautions: none      Daily Treatment Diary      Manual                     L cervical side glides  5 min Gr III/IV lower  6 min Gr III/IV  7 min Gr III/IV                   graston to L UT*                                               Assess ASLR test                         patellar mobs    *  * R did                       Exercise Diary                    Pelvic muscle energy for R anterior   5x5" holds   5x5" holds                    bridges  2x12  2x12  3x10                  Supine R nerve glides                                               MH to L cervical seated  8 min  8 min  8 min                 SLR  2x10   2x12   2x10                  Ab marching  2x10                       supine hip flexor stretch   3x30" holds R  3x30" R                   leg press      2x10 70 lbs                  knee ext mach 2 up 1 down      20 lbs R down                                                                 sidelying shoulder ER L  2x12  2x12   2x12                  Prone low trap retraction L  2x12     2x12                 Wall slides L  10x, 5x w/ LT activation     15x w/ LT activation                 Supine serratus punches  2x10   2x10                    No moneys   2x10   2x12 red                     ball on wall   20x 2 ways    20x 2 ways                                                                       Modalities                        MH to L UT during bike Did see tx   did see tx   did see tx                                                                       Assessment: Tolerated treatment well  Patient continues to progress and had no increase in symptoms even with shoulder elevation during wall slides  Some more R LE exercises were incorporated to further strengthen proximal hip musculature and R quad  Patient continues to have decreased L cervical symptoms and no longer has her R low back/radiating LE pain  Some exercises weren't completed this treatment due to patient have last visit yesterday  Plan: Progress treatment as tolerated

## 2019-03-01 ENCOUNTER — OFFICE VISIT (OUTPATIENT)
Dept: INTERNAL MEDICINE CLINIC | Facility: CLINIC | Age: 29
End: 2019-03-01

## 2019-03-01 VITALS
WEIGHT: 186.95 LBS | DIASTOLIC BLOOD PRESSURE: 80 MMHG | SYSTOLIC BLOOD PRESSURE: 120 MMHG | TEMPERATURE: 97.5 F | HEIGHT: 63 IN | BODY MASS INDEX: 33.12 KG/M2 | HEART RATE: 80 BPM

## 2019-03-01 DIAGNOSIS — R40.0 DAYTIME SOMNOLENCE: ICD-10-CM

## 2019-03-01 DIAGNOSIS — F41.0 PANIC ATTACKS: ICD-10-CM

## 2019-03-01 DIAGNOSIS — K12.0 APHTHOUS ULCER: ICD-10-CM

## 2019-03-01 DIAGNOSIS — R51.9 CHRONIC DAILY HEADACHE: Primary | ICD-10-CM

## 2019-03-01 DIAGNOSIS — R06.83 SNORING: ICD-10-CM

## 2019-03-01 PROCEDURE — 99213 OFFICE O/P EST LOW 20 MIN: CPT | Performed by: PHYSICIAN ASSISTANT

## 2019-03-01 RX ORDER — AMITRIPTYLINE HYDROCHLORIDE 10 MG/1
10 TABLET, FILM COATED ORAL
Qty: 30 TABLET | Refills: 2 | Status: SHIPPED | OUTPATIENT
Start: 2019-03-01 | End: 2019-04-11

## 2019-03-01 RX ORDER — TRIAMCINOLONE ACETONIDE 0.1 %
1 PASTE (GRAM) DENTAL 2 TIMES DAILY
Qty: 5 G | Refills: 0 | Status: SHIPPED | OUTPATIENT
Start: 2019-03-01 | End: 2019-04-11 | Stop reason: ALTCHOICE

## 2019-03-01 NOTE — ASSESSMENT & PLAN NOTE
As reviewed today you report you are having increased episodes of panic attacks over the past year  You are aware that you will contact her insurance for locations that except for mental health care  You report your not interested in medications and we did discuss that a therapist can provide you with cognitive behavioral therapy

## 2019-03-01 NOTE — PROGRESS NOTES
Assessment/Plan:    Chronic daily headache  As we reviewed today you did get some benefit from her headaches with Topamax unfortunately you had adverse reaction and were unable to continue this medication  As discussed today we will put you on a low dose of amitriptyline 10 mg at bedtime and magnesium oxide 400 mg daily in the morning  I have also provided you with a referral to Neurology as you may need additional evaluations and treatments if this when does not prove effective  Aphthous ulcer  I have sent a oral paste to your pharmacy  Please use this twice daily and remember not to eat or drink for 30 minutes after applying this medication  We also reviewed that it is likely your insurance may not cover this but you may purchase this  Daytime somnolence  We discussed today the fact that you have been told use nor along with feeling very tired in the daytime may indicate you have sleep disordered breathing  We reviewed today that this can also contribute to chronic headaches  Please call and schedule consultation with Sleep Center  Panic attacks  As reviewed today you report you are having increased episodes of panic attacks over the past year  You are aware that you will contact her insurance for locations that except for mental health care  You report your not interested in medications and we did discuss that a therapist can provide you with cognitive behavioral therapy  Diagnoses and all orders for this visit:    Chronic daily headache  -     amitriptyline (ELAVIL) 10 mg tablet; Take 1 tablet (10 mg total) by mouth daily at bedtime for 90 days  -     Ambulatory referral to Neurology; Future  -     magnesium oxide (MAG-OX) 400 mg; Take 1 tablet (400 mg total) by mouth daily for 90 days    Aphthous ulcer  -     triamcinolone (KENALOG) 0 1 % oral topical paste;  Apply 1 application topically 2 (two) times a day for 7 days    Snoring  -     Ambulatory referral to Sleep Medicine; Future    Daytime somnolence  -     Ambulatory referral to Sleep Medicine; Future    Panic attacks          Subjective:      Patient ID: Najma Brown is a 33 y o  female  Patient is here for follow up  Patient has been taking the Topamax  She started with one at night then increased to two  During this time she had tingling around the mouth and face and this sensation increased when she increased the dose  She denies difficulty breathing or swelling of the tongue  She stopped taking them three weeks ago  Patient will have three headaches a week still  They feel the same as they always have  While taking the Topamax, she stopped having the headaches after a couple of days  Patient states her  tells her she snores  She admits to waking up sometimes gasping for air  She also will have daytime somnolence after a fulls nights rest      PT for back and shoulder going well  Shoulder is still symptomatic but back better  Patient states she is having "more panic attacks " Mid January at work, her heart was racing, she could not breath, and "did not feel well at all " It was similar to an event the year prior for which she went by ambulance to the hospital      Patient has a sore on her bottom lip for the past week that is very painful  She has had them before but never lasting this long  The following portions of the patient's history were reviewed and updated as appropriate: allergies, current medications, past family history, past medical history, past social history, past surgical history and problem list     Review of Systems   Constitutional: Positive for fatigue  HENT:        As noted in HPI aphthous ulcer   Eyes: Negative  Negative for visual disturbance  Respiratory: Negative  Negative for shortness of breath  Cardiovascular: Negative  Negative for chest pain and palpitations  Gastrointestinal: Negative  Genitourinary: Negative      Musculoskeletal: Positive for myalgias  Skin: Negative for rash  Neurological: Positive for headaches  Negative for dizziness, tremors, weakness, light-headedness and numbness  Psychiatric/Behavioral: Positive for sleep disturbance (snoring)  The patient is nervous/anxious  Objective:      /80 (BP Location: Left arm, Patient Position: Sitting, Cuff Size: Standard)   Pulse 80   Temp 97 5 °F (36 4 °C) (Oral)   Ht 5' 3" (1 6 m)   Wt 84 8 kg (186 lb 15 2 oz)   BMI 33 12 kg/m²          Physical Exam   Constitutional: She is oriented to person, place, and time  She appears well-developed and well-nourished  No distress  HENT:   Head: Normocephalic and atraumatic  Mouth/Throat: Uvula is midline and oropharynx is clear and moist  Dental caries present  No tonsillar exudate  Inner lower lip mucosa with 0 5 cm white ulcer and erythematous border  No lesions outside of mouth  Eyes: Pupils are equal, round, and reactive to light  Conjunctivae, EOM and lids are normal    Cardiovascular: Normal rate, regular rhythm and normal heart sounds  Exam reveals no gallop and no friction rub  No murmur heard  Pulmonary/Chest: Effort normal and breath sounds normal  No respiratory distress  Neurological: She is alert and oriented to person, place, and time  No cranial nerve deficit or sensory deficit  Skin: Skin is warm and dry  She is not diaphoretic  Psychiatric: She has a normal mood and affect  Her behavior is normal  Judgment and thought content normal    Nursing note and vitals reviewed

## 2019-03-01 NOTE — ASSESSMENT & PLAN NOTE
We discussed today the fact that you have been told use nor along with feeling very tired in the daytime may indicate you have sleep disordered breathing  We reviewed today that this can also contribute to chronic headaches  Please call and schedule consultation with Sleep Center

## 2019-03-01 NOTE — ASSESSMENT & PLAN NOTE
As we reviewed today you did get some benefit from her headaches with Topamax unfortunately you had adverse reaction and were unable to continue this medication  As discussed today we will put you on a low dose of amitriptyline 10 mg at bedtime and magnesium oxide 400 mg daily in the morning  I have also provided you with a referral to Neurology as you may need additional evaluations and treatments if this when does not prove effective

## 2019-03-01 NOTE — ASSESSMENT & PLAN NOTE
I have sent a oral paste to your pharmacy  Please use this twice daily and remember not to eat or drink for 30 minutes after applying this medication  We also reviewed that it is likely your insurance may not cover this but you may purchase this

## 2019-03-04 ENCOUNTER — OFFICE VISIT (OUTPATIENT)
Dept: PHYSICAL THERAPY | Age: 29
End: 2019-03-04
Payer: COMMERCIAL

## 2019-03-04 DIAGNOSIS — M62.838 TRAPEZIUS MUSCLE SPASM: Primary | ICD-10-CM

## 2019-03-04 PROCEDURE — 97140 MANUAL THERAPY 1/> REGIONS: CPT

## 2019-03-04 PROCEDURE — 97110 THERAPEUTIC EXERCISES: CPT

## 2019-03-04 NOTE — PROGRESS NOTES
Daily Note     Today's date: 3/4/2019  Patient name: Najma Brown  : 1990  MRN: [id number]  Referring provider: Duncan Huitron PA-C  Dx:   Encounter Diagnosis     ICD-10-CM    1  Trapezius muscle spasm M62 838                   Subjective: Pt reports L sided UT tightness and soreness today but no radiating pain into L arm   Noted pt constantly looking at phone in waiting room    Objective: See treatment diary below    Precautions: none      Daily Treatment Diary      Manual     3/4                L cervical side glides  5 min Gr III/IV lower  6 min Gr III/IV  7 min Gr III/IV   7m                graston to L UT*                                               Assess ASLR test                         patellar mobs    *  * R did  3min                     Exercise Diary   2/21  2/27  2/28  3/4               Pelvic muscle energy for R anterior   5x5" holds   5x5" holds                    bridges  2x12  2x12  3x10   3x10               Supine R nerve glides                                               MH to L cervical seated  8 min  8 min  8 min  8min               SLR  2x10   2x12   2x10   1# 2x10               Ab marching  2x10                       supine hip flexor stretch   3x30" holds R  3x30" R   3 x30 s                leg press      2x10 70 lbs  2x10 70 lbs                knee ext mach 2 up 1 down      20 lbs R down  20# x20 reps                                                               sidelying shoulder ER L  2x12  2x12   2x12   1# 2x10               Prone low trap retraction L  2x12     2x12  2x12               Wall slides L  10x, 5x w/ LT activation     15x w/ LT activation  20x w/LT activation               Supine serratus punches  2x10   2x10     2# 2x10               No moneys   2x10   2x12 red     NT                ball on wall   20x 2 ways    20x 2 ways  20x ea                s/l hip abd        2x10                                             Modalities                         to L UT during bike Did see tx   did see tx   did see tx                                                                       Assessment: Tolerated treatment well  Patient continues to progress and had no increase in symptoms even with shoulder elevation during wall slides  Some more R LE exercises were incorporated to further strengthen proximal hip musculature and R quad  Patient continues to have decreased L cervical symptoms and no longer has her R low back/radiating LE pain  Reports increased fatigue with increased intensity    Plan: Progress treatment as tolerated

## 2019-03-06 ENCOUNTER — TELEPHONE (OUTPATIENT)
Dept: NEUROLOGY | Facility: CLINIC | Age: 29
End: 2019-03-06

## 2019-03-07 ENCOUNTER — OFFICE VISIT (OUTPATIENT)
Dept: PHYSICAL THERAPY | Age: 29
End: 2019-03-07
Payer: COMMERCIAL

## 2019-03-07 DIAGNOSIS — M62.838 TRAPEZIUS MUSCLE SPASM: Primary | ICD-10-CM

## 2019-03-07 DIAGNOSIS — M54.31 RIGHT SCIATIC NERVE PAIN: ICD-10-CM

## 2019-03-07 PROCEDURE — 97110 THERAPEUTIC EXERCISES: CPT | Performed by: PHYSICAL THERAPIST

## 2019-03-07 PROCEDURE — 97140 MANUAL THERAPY 1/> REGIONS: CPT | Performed by: PHYSICAL THERAPIST

## 2019-03-07 NOTE — PROGRESS NOTES
Daily Note     Today's date: 3/7/2019  Patient name: Kasey Bowser  : 1990  MRN: [id number]  Referring provider: Martinez Birch PA-C  Dx:   Encounter Diagnosis     ICD-10-CM    1  Trapezius muscle spasm M62 838    2  Right sciatic nerve pain M54 31                   Subjective: Patient reports continued improvement with only intermittent L posterior cervical pain and R knee pain      Objective: See treatment diary below    Precautions: none      Daily Treatment Diary      Manual     3  3/7              L cervical side glides  5 min Gr III/IV lower  6 min Gr III/IV  7 min Gr III/IV   7m  7 min Gr III/IV              graston to L UT*                                               Assess ASLR test                         patellar mobs    *  * R did  3min  3 min Gr III/IV                   Exercise Diary   2/21  2/27  2/28  3/4  37             Pelvic muscle energy for R anterior   5x5" holds   5x5" holds                    bridges  2x12  2x12  3x10   3x10  3x10             Supine R nerve glides                                               MH to L cervical seated  8 min  8 min  8 min  8min  8 min             SLR  2x10   2x12   2x10   1# 2x10  2x12             Ab marching  2x10                       supine hip flexor stretch   3x30" holds R  3x30" R   3 x30 s  3x30"              leg press      2x10 70 lbs  2x10 70 lbs  2x12 70 lbs              knee ext mach 2 up 1 down      20 lbs R down  20# x20 reps  2x10 20 lbs                                                             sidelying shoulder ER L  2x12  2x12   2x12   1# 2x10  2x12 1 lb             Prone low trap retraction L  2x12     2x12  2x12  2x12             Wall slides L  10x, 5x w/ LT activation     15x w/ LT activation  20x w/LT activation  20x w/ LT activation             Supine serratus punches  2x10   2x10     2# 2x10  2x12 1 lb             No moneys   2x10   2x12 red     NT                ball on wall   20x 2 ways    20x 2 ways  20x ea  20x each 2 ways              s/l hip abd        2x10                                             Modalities                        MH to L UT during bike Did see tx   did see tx   did see tx                                                                       Assessment: Tolerated treatment well  Patient had no increase in symptoms post treatment and continues to respond well to manuals and patient notes decreased stiffness in L posterior cervical region  Continue to progress strengthening as tolerated  Plan: Progress treatment as tolerated

## 2019-03-11 ENCOUNTER — OFFICE VISIT (OUTPATIENT)
Dept: PHYSICAL THERAPY | Age: 29
End: 2019-03-11
Payer: COMMERCIAL

## 2019-03-11 DIAGNOSIS — M54.31 RIGHT SCIATIC NERVE PAIN: ICD-10-CM

## 2019-03-11 DIAGNOSIS — M62.838 TRAPEZIUS MUSCLE SPASM: Primary | ICD-10-CM

## 2019-03-11 PROCEDURE — 97140 MANUAL THERAPY 1/> REGIONS: CPT | Performed by: PHYSICAL THERAPIST

## 2019-03-11 PROCEDURE — 97112 NEUROMUSCULAR REEDUCATION: CPT | Performed by: PHYSICAL THERAPIST

## 2019-03-11 PROCEDURE — 97110 THERAPEUTIC EXERCISES: CPT | Performed by: PHYSICAL THERAPIST

## 2019-03-11 NOTE — PROGRESS NOTES
Daily Note     Today's date: 3/11/2019  Patient name: Ozzy Carvajal  : 1990  MRN: [id number]  Referring provider: Brittanie Gaines PA-C  Dx:   Encounter Diagnosis     ICD-10-CM    1  Trapezius muscle spasm M62 838    2  Right sciatic nerve pain M54 31                   Subjective: Patient reports continued improvement       Objective: See treatment diary below    Precautions: none      Daily Treatment Diary      Manual   2/21 2/27  2/28  3/4  3/7  3/11            L cervical side glides  5 min Gr III/IV lower  6 min Gr III/IV  7 min Gr III/IV   7m  7 min Gr III/IV  7 min Gr III/IV lower cervical            graston to L UT*                                               Assess ASLR test                         patellar mobs    *  * R did  3min  3 min Gr III/IV  3 min Gr III/IV med, med/inf                 Exercise Diary   2/21  2/27  2/28  3/4  3/7  3/11           Pelvic muscle energy for R anterior   5x5" holds   5x5" holds                    bridges  2x12  2x12  3x10   3x10  3x10  3x10           Supine R nerve glides                                               MH to L cervical seated  8 min  8 min  8 min  8min  8 min  8 min           SLR  2x10   2x12   2x10   1# 2x10  2x12  2x12 B           Ab marching  2x10                       supine hip flexor stretch   3x30" holds R  3x30" R   3 x30 s  3x30"  3x30"             leg press      2x10 70 lbs  2x10 70 lbs  2x12 70 lbs  2x12 70 lbs            knee ext mach 2 up 1 down      20 lbs R down  20# x20 reps  2x10 20 lbs  2x10 20 lbs                                                           sidelying shoulder ER L  2x12  2x12   2x12   1# 2x10  2x12 1 lb  2x12 1 lb           Prone low trap retraction L  2x12     2x12  2x12  2x12  2x12           Wall slides L  10x, 5x w/ LT activation     15x w/ LT activation  20x w/LT activation  20x w/ LT activation  20x            Supine serratus punches  2x10   2x10     2# 2x10  2x12 1 lb  2x12 1 lb           No moneys  2x10   2x12 red     NT                ball on wall   20x 2 ways    20x 2 ways  20x ea  20x each 2 ways  20x            s/l hip abd        2x10                                             Modalities                        MH to L UT during bike Did see tx   did see tx   did see tx                                                                       Assessment: Tolerated treatment well  Patient had no increase in symptoms post treatment  Some cuing was needed for proper completion of SLR to ensure good quad activation throughout as well as shoulder elevation with low trap activation  Plan: Progress treatment as tolerated

## 2019-03-14 ENCOUNTER — OFFICE VISIT (OUTPATIENT)
Dept: PHYSICAL THERAPY | Age: 29
End: 2019-03-14
Payer: COMMERCIAL

## 2019-03-14 DIAGNOSIS — M62.838 TRAPEZIUS MUSCLE SPASM: Primary | ICD-10-CM

## 2019-03-14 DIAGNOSIS — M54.31 RIGHT SCIATIC NERVE PAIN: ICD-10-CM

## 2019-03-14 PROCEDURE — 97110 THERAPEUTIC EXERCISES: CPT | Performed by: PHYSICAL THERAPIST

## 2019-03-14 PROCEDURE — 97140 MANUAL THERAPY 1/> REGIONS: CPT | Performed by: PHYSICAL THERAPIST

## 2019-03-14 NOTE — PROGRESS NOTES
Daily Note     Today's date: 3/14/2019  Patient name: Tj Feliz  : 1990  MRN: [id number]  Referring provider: Tiffanie Gallego PA-C  Dx:   Encounter Diagnosis     ICD-10-CM    1  Trapezius muscle spasm M62 838    2  Right sciatic nerve pain M54 31                   Subjective: Patient reports continued improvement, no issues and went back to work this week      Objective: See treatment diary below    Precautions: none      Daily Treatment Diary      Manual   2/21 2/27  2/28  3/4  3/7  3/11  3/14          L cervical side glides  5 min Gr III/IV lower  6 min Gr III/IV  7 min Gr III/IV   7m  7 min Gr III/IV  7 min Gr III/IV lower cervical  7 min Gr III/IV lower cervical          graston to L UT*                                               Assess ASLR test                         patellar mobs    *  * R did  3min  3 min Gr III/IV  3 min Gr III/IV med, med/inf  3 min Gr III/IV               Exercise Diary   2/21  2/27  2/28  3/4  3/7  3/11  3/14         Pelvic muscle energy for R anterior   5x5" holds   5x5" holds                    bridges  2x12  2x12  3x10   3x10  3x10  3x10  3x10         Supine R nerve glides                                               MH to L cervical seated  8 min  8 min  8 min  8min  8 min  8 min  8 min         SLR  2x10   2x12   2x10   1# 2x10  2x12  2x12 B  2x12 B         Ab marching  2x10                       supine hip flexor stretch   3x30" holds R  3x30" R   3 x30 s  3x30"  3x30"   3x30"           leg press      2x10 70 lbs  2x10 70 lbs  2x12 70 lbs  2x12 70 lbs  2x12 70 lbs          knee ext mach 2 up 1 down      20 lbs R down  20# x20 reps  2x10 20 lbs  2x10 20 lbs  2x12 20 lbs                                                         sidelying shoulder ER L  2x12  2x12   2x12   1# 2x10  2x12 1 lb  2x12 1 lb  2x12 1 lb         Prone low trap retraction L  2x12     2x12  2x12  2x12  2x12           Wall slides L  10x, 5x w/ LT activation     15x w/ LT activation  20x w/LT activation  20x w/ LT activation  20x   20x         Supine serratus punches  2x10   2x10     2# 2x10  2x12 1 lb  2x12 1 lb  2x12 1 lb         No moneys   2x10   2x12 red     NT                ball on wall   20x 2 ways    20x 2 ways  20x ea  20x each 2 ways  20x  20x 2 ways           s/l hip abd        2x10                                             Modalities                        MH to L UT during bike Did see tx   did see tx   did see tx                                                                       Assessment: Tolerated treatment well  Patient had no increase in symptoms post treatment  Patient should be considered for discharge next visit if continues to progress functionally  Plan: Progress treatment as tolerated

## 2019-03-18 ENCOUNTER — OFFICE VISIT (OUTPATIENT)
Dept: PHYSICAL THERAPY | Age: 29
End: 2019-03-18
Payer: COMMERCIAL

## 2019-03-18 DIAGNOSIS — M54.31 RIGHT SCIATIC NERVE PAIN: ICD-10-CM

## 2019-03-18 DIAGNOSIS — M62.838 TRAPEZIUS MUSCLE SPASM: Primary | ICD-10-CM

## 2019-03-18 PROCEDURE — 97140 MANUAL THERAPY 1/> REGIONS: CPT | Performed by: PHYSICAL THERAPIST

## 2019-03-18 PROCEDURE — 97110 THERAPEUTIC EXERCISES: CPT | Performed by: PHYSICAL THERAPIST

## 2019-03-18 NOTE — PROGRESS NOTES
PT Discharge Note     Today's date: 3/18/2019  Patient name: Leoncio Dockery  : 1990  MRN: [id number]  Referring provider: Lizzie Villanueva PA-C  Dx:   Encounter Diagnosis     ICD-10-CM    1  Trapezius muscle spasm M62 838    2  Right sciatic nerve pain M54 31                   Subjective: Patient reports able to self manage symptoms and no issues at work      Objective: See treatment diary below    Precautions: none      Daily Treatment Diary      Manual   2/21 2/27  2/28  3/4  3/7  3/11  3/14  3/18        L cervical side glides  5 min Gr III/IV lower  6 min Gr III/IV  7 min Gr III/IV   7m  7 min Gr III/IV  7 min Gr III/IV lower cervical  7 min Gr III/IV lower cervical          graston to L UT*                                               Assess ASLR test                         patellar mobs    *  * R did  3min  3 min Gr III/IV  3 min Gr III/IV med, med/inf  3 min Gr III/IV  6 min Gr III/IV             Exercise Diary   2/21  2/27  2/28  3/4  3/7  3/11  3/14  3/18        Pelvic muscle energy for R anterior   5x5" holds   5x5" holds                    bridges  2x12  2x12  3x10   3x10  3x10  3x10  3x10  3x10       Supine R nerve glides                                               MH to L cervical seated  8 min  8 min  8 min  8min  8 min  8 min  8 min  8 min       SLR  2x10   2x12   2x10   1# 2x10  2x12  2x12 B  2x12 B  2x12 B       Ab marching  2x10                       supine hip flexor stretch   3x30" holds R  3x30" R   3 x30 s  3x30"  3x30"   3x30"   3x30"         leg press      2x10 70 lbs  2x10 70 lbs  2x12 70 lbs  2x12 70 lbs  2x12 70 lbs  2x12 70 lbs        knee ext mach 2 up 1 down      20 lbs R down  20# x20 reps  2x10 20 lbs  2x10 20 lbs  2x12 20 lbs  2x12 20 lbs                                                       sidelying shoulder ER L  2x12  2x12   2x12   1# 2x10  2x12 1 lb  2x12 1 lb  2x12 1 lb         Prone low trap retraction L  2x12     2x12  2x12  2x12  2x12           Wall slides L  10x, 5x w/ LT activation     15x w/ LT activation  20x w/LT activation  20x w/ LT activation  20x   20x  20x       Supine serratus punches  2x10   2x10     2# 2x10  2x12 1 lb  2x12 1 lb  2x12 1 lb  2x12 1 lb       No moneys   2x10   2x12 red     NT                ball on wall   20x 2 ways    20x 2 ways  20x ea  20x each 2 ways  20x  20x 2 ways   20x 2 ways        s/l hip abd        2x10                                             Modalities                        MH to L UT during bike Did see tx   did see tx   did see tx                                                                       Assessment: Tolerated treatment well  Patient  is being discharged due to meeting all functional goals and showing ability to self manage her symptoms  Patient educated to continue HEP on a regular basis to help prevent symptoms from recurring      Plan: Discharge

## 2019-03-21 ENCOUNTER — APPOINTMENT (OUTPATIENT)
Dept: PHYSICAL THERAPY | Age: 29
End: 2019-03-21
Payer: COMMERCIAL

## 2019-04-11 ENCOUNTER — OFFICE VISIT (OUTPATIENT)
Dept: INTERNAL MEDICINE CLINIC | Facility: CLINIC | Age: 29
End: 2019-04-11

## 2019-04-11 ENCOUNTER — OFFICE VISIT (OUTPATIENT)
Dept: SLEEP CENTER | Facility: CLINIC | Age: 29
End: 2019-04-11
Payer: COMMERCIAL

## 2019-04-11 VITALS
TEMPERATURE: 97.5 F | HEIGHT: 63 IN | BODY MASS INDEX: 33.28 KG/M2 | WEIGHT: 187.83 LBS | SYSTOLIC BLOOD PRESSURE: 100 MMHG | DIASTOLIC BLOOD PRESSURE: 70 MMHG | HEART RATE: 80 BPM

## 2019-04-11 VITALS
HEIGHT: 62 IN | SYSTOLIC BLOOD PRESSURE: 110 MMHG | DIASTOLIC BLOOD PRESSURE: 68 MMHG | BODY MASS INDEX: 34.04 KG/M2 | WEIGHT: 185 LBS

## 2019-04-11 DIAGNOSIS — L70.0 ACNE VULGARIS: Chronic | ICD-10-CM

## 2019-04-11 DIAGNOSIS — J30.2 SEASONAL ALLERGIES: ICD-10-CM

## 2019-04-11 DIAGNOSIS — R51.9 CHRONIC DAILY HEADACHE: Primary | ICD-10-CM

## 2019-04-11 DIAGNOSIS — E66.09 CLASS 1 OBESITY DUE TO EXCESS CALORIES WITHOUT SERIOUS COMORBIDITY WITH BODY MASS INDEX (BMI) OF 33.0 TO 33.9 IN ADULT: Chronic | ICD-10-CM

## 2019-04-11 DIAGNOSIS — R06.83 SNORING: ICD-10-CM

## 2019-04-11 DIAGNOSIS — R40.0 DAYTIME SOMNOLENCE: ICD-10-CM

## 2019-04-11 PROBLEM — K12.0 APHTHOUS ULCER: Status: RESOLVED | Noted: 2019-03-01 | Resolved: 2019-04-11

## 2019-04-11 PROBLEM — M62.838 TRAPEZIUS MUSCLE SPASM: Status: RESOLVED | Noted: 2019-01-17 | Resolved: 2019-04-11

## 2019-04-11 PROBLEM — R31.9 HEMATURIA: Status: RESOLVED | Noted: 2018-11-28 | Resolved: 2019-04-11

## 2019-04-11 PROBLEM — R09.81 NASAL CONGESTION: Chronic | Status: ACTIVE | Noted: 2019-04-11

## 2019-04-11 PROBLEM — K21.9 GASTROESOPHAGEAL REFLUX DISEASE WITHOUT ESOPHAGITIS: Status: RESOLVED | Noted: 2018-10-08 | Resolved: 2019-04-11

## 2019-04-11 PROCEDURE — 99244 OFF/OP CNSLTJ NEW/EST MOD 40: CPT | Performed by: INTERNAL MEDICINE

## 2019-04-11 PROCEDURE — 99213 OFFICE O/P EST LOW 20 MIN: CPT | Performed by: INTERNAL MEDICINE

## 2019-04-11 RX ORDER — AMITRIPTYLINE HYDROCHLORIDE 25 MG/1
25 TABLET, FILM COATED ORAL
Qty: 90 TABLET | Refills: 1 | Status: SHIPPED | OUTPATIENT
Start: 2019-04-11 | End: 2019-07-25

## 2019-04-11 RX ORDER — FLUTICASONE PROPIONATE 50 MCG
1 SPRAY, SUSPENSION (ML) NASAL DAILY
Qty: 50 G | Refills: 3 | Status: SHIPPED | OUTPATIENT
Start: 2019-04-11 | End: 2019-07-25

## 2019-04-13 DIAGNOSIS — R51.9 CHRONIC DAILY HEADACHE: ICD-10-CM

## 2019-04-15 RX ORDER — TOPIRAMATE 25 MG/1
CAPSULE, COATED PELLETS ORAL
Qty: 60 CAPSULE | Refills: 2 | OUTPATIENT
Start: 2019-04-15

## 2019-04-16 ENCOUNTER — TRANSCRIBE ORDERS (OUTPATIENT)
Dept: ADMINISTRATIVE | Age: 29
End: 2019-04-16

## 2019-04-16 ENCOUNTER — APPOINTMENT (OUTPATIENT)
Dept: LAB | Age: 29
End: 2019-04-16
Payer: COMMERCIAL

## 2019-04-16 LAB — TSH SERPL DL<=0.05 MIU/L-ACNC: 0.97 UIU/ML (ref 0.36–3.74)

## 2019-04-16 PROCEDURE — 84443 ASSAY THYROID STIM HORMONE: CPT | Performed by: PHYSICIAN ASSISTANT

## 2019-04-16 PROCEDURE — 36415 COLL VENOUS BLD VENIPUNCTURE: CPT | Performed by: PHYSICIAN ASSISTANT

## 2019-04-17 ENCOUNTER — TELEPHONE (OUTPATIENT)
Dept: INTERNAL MEDICINE CLINIC | Facility: CLINIC | Age: 29
End: 2019-04-17

## 2019-06-04 ENCOUNTER — TELEPHONE (OUTPATIENT)
Dept: SLEEP CENTER | Facility: CLINIC | Age: 29
End: 2019-06-04

## 2019-06-09 DIAGNOSIS — R51.9 CHRONIC DAILY HEADACHE: ICD-10-CM

## 2019-06-09 RX ORDER — LANOLIN ALCOHOL/MO/W.PET/CERES
CREAM (GRAM) TOPICAL
Qty: 90 TABLET | Refills: 0 | Status: SHIPPED | OUTPATIENT
Start: 2019-06-09 | End: 2019-07-25

## 2019-06-14 DIAGNOSIS — N92.0 MENORRHAGIA WITH REGULAR CYCLE: ICD-10-CM

## 2019-06-29 DIAGNOSIS — N92.0 MENORRHAGIA WITH REGULAR CYCLE: ICD-10-CM

## 2019-07-01 ENCOUNTER — TELEPHONE (OUTPATIENT)
Dept: OBGYN CLINIC | Facility: CLINIC | Age: 29
End: 2019-07-01

## 2019-07-01 NOTE — TELEPHONE ENCOUNTER
Left message in response to medication refill  Pt needs appointment and medication was changed at last visit 6/2018

## 2019-07-03 RX ORDER — NORGESTIMATE AND ETHINYL ESTRADIOL 0.25-0.035
1 KIT ORAL DAILY
Qty: 28 TABLET | Refills: 0 | OUTPATIENT
Start: 2019-07-03

## 2019-07-09 DIAGNOSIS — N92.0 MENORRHAGIA WITH REGULAR CYCLE: ICD-10-CM

## 2019-07-25 ENCOUNTER — OFFICE VISIT (OUTPATIENT)
Dept: OBGYN CLINIC | Facility: CLINIC | Age: 29
End: 2019-07-25

## 2019-07-25 VITALS
HEIGHT: 63 IN | BODY MASS INDEX: 32.96 KG/M2 | DIASTOLIC BLOOD PRESSURE: 86 MMHG | WEIGHT: 186 LBS | SYSTOLIC BLOOD PRESSURE: 117 MMHG | HEART RATE: 80 BPM

## 2019-07-25 DIAGNOSIS — Z11.3 SCREEN FOR STD (SEXUALLY TRANSMITTED DISEASE): ICD-10-CM

## 2019-07-25 DIAGNOSIS — N92.0 MENORRHAGIA WITH REGULAR CYCLE: ICD-10-CM

## 2019-07-25 DIAGNOSIS — Z01.419 ENCOUNTER FOR ANNUAL ROUTINE GYNECOLOGICAL EXAMINATION: Primary | ICD-10-CM

## 2019-07-25 PROCEDURE — 87491 CHLMYD TRACH DNA AMP PROBE: CPT | Performed by: NURSE PRACTITIONER

## 2019-07-25 PROCEDURE — 99395 PREV VISIT EST AGE 18-39: CPT | Performed by: NURSE PRACTITIONER

## 2019-07-25 PROCEDURE — 87624 HPV HI-RISK TYP POOLED RSLT: CPT | Performed by: NURSE PRACTITIONER

## 2019-07-25 PROCEDURE — 87591 N.GONORRHOEAE DNA AMP PROB: CPT | Performed by: NURSE PRACTITIONER

## 2019-07-25 PROCEDURE — G0145 SCR C/V CYTO,THINLAYER,RESCR: HCPCS | Performed by: NURSE PRACTITIONER

## 2019-07-25 RX ORDER — NORGESTIMATE AND ETHINYL ESTRADIOL 0.25-0.035
1 KIT ORAL DAILY
Qty: 28 TABLET | Refills: 11 | Status: SHIPPED | OUTPATIENT
Start: 2019-07-25 | End: 2020-07-27

## 2019-07-25 NOTE — PROGRESS NOTES
Chano Patel was seen today for gynecologic exam     Diagnoses and all orders for this visit:    Encounter for annual routine gynecological examination  -     Liquid-based pap, screening    Menorrhagia with regular cycle  -     norgestimate-ethinyl estradiol ([id number] [id number]) 0 25-35 MG-MCG per tablet; Take 1 tablet by mouth daily  -     Hepatitis B surface antigen; Future  -     HIV 1/2 AG-AB combo; Future  -     RPR; Future    Screen for STD (sexually transmitted disease)  -     Chlamydia/GC amplified DNA by PCR     will call results to patient    ASSESSMENT & PLAN: Seven Masters is a 26 y o  No obstetric history on file  with normal gynecologic exam     1   Routine well woman exam done today  2  Pap and HPV:  The patient's last pap and hpv was 2016  It was normal     Pap and cotesting was done today  Current ASCCP Guidelines reviewed  3   The following were reviewed in today's visit: breast self exam, STD testing, HIV risk factors and prevention, use and side effects of OCPs, adequate intake of calcium and vitamin D and exercise  Culture for chlamydia gonorrhea sent today  Labs for HIV, RPR, hepatitis-B S Ag given to patient to complete  4  Patient desires has had Sprintec, history of menorrhagia -irregular menses after her tubal   Sprintec 1 pack with 11 refill sent to pharmacy    CC:  Annual Gynecologic Examination    HPI: Seven Masters is a 26 y o   [id number] history on file  who presents for annual gynecologic examination  Patient with history of tubal ligation  The patient had irregular periods after her tubal so she was started on Sprintec  She has regular menses with Sprintec and desires to continue  Will renew her prescription for 1 year  She is going through a divorce  She has the following concerns:   Desires STD testing she is with a new partner     patient reports  Will get  yeast infections about every 2 months,  She uses over-the-counter cream and  symptoms resolve within 4 days  Currently does not have any symptoms but will return when she does  Reviewed decrease carbohydrates  In her diet,  Denies history of gestational diabetes  The patient actively trying to lose weight, she just joined a gym  Review to change wet clothing as soon as she can for yeast prevention  Health Maintenance:    She wears her seatbelt routinely  She does perform regular monthly self breast exams  She feels safe at home  Past Medical History:   Diagnosis Date    Anemia     Last assessed 4/10/2015    Heart murmur     Hemorrhoids     Migraine     Obesity     Last assessed 12/30/2013    Seasonal allergies        Past Surgical History:   Procedure Laterality Date    ABDOMINOPLASTY      NE HEMORRHOIDECTOMY,INT/EXT, 2+ COLUMNS/GROUPS N/A 3/10/2017    Procedure: HEMORRHOIDECTOMY ;  Surgeon: Otilia Sauer MD;  Location: [facility];  Service: Colorectal    TUBAL LIGATION         Past OB/Gyn History:  OB History    None       Pt does not have menstrual issues  History of sexually transmitted infection: Yes  chlamydia  History of abnormal pap smears: Yes  And follow-up Paps were all negative  Patient is currently sexually active  heterosexual   The current method of family planning is OCP (estrogen/progesterone)      Family History   Problem Relation Age of Onset    Anemia Mother     Hypertension Mother     Down syndrome Brother     Diabetes Maternal Grandmother     Alzheimer's disease Paternal Grandmother        Social History:  Social History     Socioeconomic History    Marital status: /Civil Union     Spouse name: Not on file    Number of children: 3    Years of education: Not on file    Highest education level: Not on file   Occupational History     Employer: [employer]   Social Needs    Financial resource strain: Not on file    Food insecurity:     Worry: Not on file     Inability: Not on file    Transportation needs:     Medical: Not on file Non-medical: Not on file   Tobacco Use    Smoking status: Never Smoker    Smokeless tobacco: Never Used   Substance and Sexual Activity    Alcohol use: Yes     Comment: socially    Drug use: No    Sexual activity: Yes     Partners: Male     Birth control/protection: OCP   Lifestyle    Physical activity:     Days per week: Not on file     Minutes per session: Not on file    Stress: Not on file   Relationships    Social connections:     Talks on phone: Not on file     Gets together: Not on file     Attends Sikh service: Not on file     Active member of club or organization: Not on file     Attends meetings of clubs or organizations: Not on file     Relationship status: Not on file    Intimate partner violence:     Fear of current or ex partner: Not on file     Emotionally abused: Not on file     Physically abused: Not on file     Forced sexual activity: Not on file   Other Topics Concern    Not on file   Social History Narrative    Exercises regularly     Presently lives with family  Patient is     Patient is currently employed  She works full-time    Allergies   Allergen Reactions    Pollen Extract          Current Outpatient Medications:     amitriptyline (ELAVIL) 25 mg tablet, Take 1 tablet (25 mg total) by mouth daily at bedtime for 180 days, Disp: 90 tablet, Rfl: 1    fluticasone (FLONASE) 50 mcg/act nasal spray, 1 spray into each nostril daily, Disp: 50 g, Rfl: 3    magnesium Oxide (MAG-OX) 400 mg TABS, TAKE 1 TABLET (400 MG TOTAL) BY MOUTH DAILY, Disp: 90 tablet, Rfl: 0    polyethylene glycol (GLYCOLAX) powder, Take 17 g by mouth daily for 90 days, Disp: 500 g, Rfl: 1    SPRINTEC 28 0 25-35 MG-MCG per tablet, TAKE 1 TABLET BY MOUTH EVERY DAY, Disp: 28 tablet, Rfl: 0      Review of Systems  Constitutional :no fever, feels well, no tiredness, no recent weight gain or loss  ENT: no ear ache, no loss of hearing, no nosebleeds or nasal discharge, no sore throat or hoarseness  Cardiovascular: no complaints of slow or fast heart beat, no chest pain, no palpitations, no leg claudication or lower extremity edema  Respiratory: no complaints of shortness of shortness of breath, no HARLEY  Breasts:no complaints of breast pain, breast lump, or nipple discharge  Gastrointestinal: no complaints of abdominal pain, constipation, nausea, vomiting, or diarrhea or bloody stools  Genitourinary : no complaints of dysuria, incontinence, pelvic pain, no dysmenorrhea, vaginal discharge or abnormal vaginal bleeding and as noted in HPI  Musculoskeletal: no complaints of arthralgia, no myalgia, no joint swelling or stiffness, no limb pain or swelling  Integumentary: no complaints of skin rash or lesion, itching or dry skin  Neurological: no complaints of headache, no confusion, no numbness or tingling, no dizziness or fainting    Objective      There were no vitals taken for this visit  General:   appears stated age, cooperative, alert normal mood and affect   Neck: normal, supple,trachea midline, no masses   Heart: regular rate and rhythm, S1, S2 normal, no murmur, click, rub or gallop   Lungs: clear to auscultation bilaterally   Breasts: normal appearance, no masses or tenderness, Inspection negative, No nipple retraction or dimpling, No axillary or supraclavicular adenopathy, Normal to palpation without dominant masses, Taught monthly breast self examination   Abdomen: soft, non-tender, without masses or organomegaly   Vulva: normal female genitalia, Bartholin's, Urethra, [place] normal   Vagina: normal vagina, no discharge, exudate, lesion, or erythema   Urethra: normal   Cervix: PAP done  Friable  GCC done  Nontender  Uterus: normal size, contour, position, consistency, mobility, non-tender   Adnexa: no mass, fullness, tenderness   Lymphatic palpation of lymph nodes in neck, axilla, groin and/or other locations: no lymphadenopathy or masses noted   Skin normal skin turgor and no rashes  Psychiatric orientation to person, place, and time: normal  mood and affect: normal

## 2019-07-26 ENCOUNTER — TRANSCRIBE ORDERS (OUTPATIENT)
Dept: ADMINISTRATIVE | Age: 29
End: 2019-07-26

## 2019-07-26 ENCOUNTER — APPOINTMENT (OUTPATIENT)
Dept: LAB | Age: 29
End: 2019-07-26

## 2019-07-26 DIAGNOSIS — N92.0 MENORRHAGIA WITH REGULAR CYCLE: ICD-10-CM

## 2019-07-26 LAB
C TRACH DNA SPEC QL NAA+PROBE: NEGATIVE
HPV HR 12 DNA CVX QL NAA+PROBE: NEGATIVE
HPV16 DNA CVX QL NAA+PROBE: NEGATIVE
HPV18 DNA CVX QL NAA+PROBE: NEGATIVE
N GONORRHOEA DNA SPEC QL NAA+PROBE: NEGATIVE

## 2019-07-26 PROCEDURE — 87340 HEPATITIS B SURFACE AG IA: CPT

## 2019-07-26 PROCEDURE — 86592 SYPHILIS TEST NON-TREP QUAL: CPT

## 2019-07-26 PROCEDURE — 87389 HIV-1 AG W/HIV-1&-2 AB AG IA: CPT

## 2019-07-26 PROCEDURE — 36415 COLL VENOUS BLD VENIPUNCTURE: CPT

## 2019-07-27 LAB — HBV SURFACE AG SER QL: NORMAL

## 2019-07-28 LAB — RPR SER QL: NORMAL

## 2019-07-29 LAB — HIV 1+2 AB+HIV1 P24 AG SERPL QL IA: NORMAL

## 2019-07-30 ENCOUNTER — TELEPHONE (OUTPATIENT)
Dept: OBGYN CLINIC | Facility: CLINIC | Age: 29
End: 2019-07-30

## 2019-07-31 LAB
LAB AP GYN PRIMARY INTERPRETATION: NORMAL
Lab: NORMAL

## 2019-08-06 ENCOUNTER — TELEPHONE (OUTPATIENT)
Dept: OBGYN CLINIC | Facility: CLINIC | Age: 29
End: 2019-08-06

## 2019-08-06 NOTE — TELEPHONE ENCOUNTER
----- Message from Kamla Anguiano, [address] sent at 8/5/2019 10:14 PM EDT -----  Please call pt to inform that her pap and HPV were negative and culture for GC/Ct were both negative    Thanks

## 2019-08-06 NOTE — TELEPHONE ENCOUNTER
----- Message from Robby Wan, [address] sent at 8/5/2019 10:14 PM EDT -----  Please call pt to inform that her pap and HPV were negative and culture for GC/Ct were both negative    Thanks

## 2019-08-08 ENCOUNTER — TELEPHONE (OUTPATIENT)
Dept: OBGYN CLINIC | Facility: CLINIC | Age: 29
End: 2019-08-08

## 2019-09-14 ENCOUNTER — HOSPITAL ENCOUNTER (EMERGENCY)
Facility: HOSPITAL | Age: 29
Discharge: HOME | End: 2019-09-14
Admitting: EMERGENCY MEDICINE
Payer: COMMERCIAL

## 2019-09-14 VITALS
HEART RATE: 84 BPM | DIASTOLIC BLOOD PRESSURE: 79 MMHG | OXYGEN SATURATION: 99 % | RESPIRATION RATE: 16 BRPM | WEIGHT: 187.17 LBS | HEIGHT: 64 IN | BODY MASS INDEX: 31.95 KG/M2 | TEMPERATURE: 98.3 F | SYSTOLIC BLOOD PRESSURE: 132 MMHG

## 2019-09-14 DIAGNOSIS — B37.3 YEAST VAGINITIS: ICD-10-CM

## 2019-09-14 DIAGNOSIS — M54.50 ACUTE LOW BACK PAIN: Primary | ICD-10-CM

## 2019-09-14 PROCEDURE — 87591 N.GONORRHOEAE DNA AMP PROB: CPT | Performed by: PHYSICIAN ASSISTANT

## 2019-09-14 PROCEDURE — 99284 EMERGENCY DEPT VISIT MOD MDM: CPT | Performed by: PHYSICIAN ASSISTANT

## 2019-09-14 PROCEDURE — 87491 CHLMYD TRACH DNA AMP PROBE: CPT | Performed by: PHYSICIAN ASSISTANT

## 2019-09-14 PROCEDURE — 96372 THER/PROPH/DIAG INJ SC/IM: CPT

## 2019-09-14 PROCEDURE — 99283 EMERGENCY DEPT VISIT LOW MDM: CPT

## 2019-09-14 RX ORDER — CYCLOBENZAPRINE HCL 10 MG
10 TABLET ORAL 2 TIMES DAILY PRN
Qty: 20 TABLET | Refills: 0 | Status: SHIPPED | OUTPATIENT
Start: 2019-09-14 | End: 2022-07-14

## 2019-09-14 RX ORDER — FLUCONAZOLE 150 MG/1
150 TABLET ORAL DAILY
Qty: 1 TABLET | Refills: 0 | Status: SHIPPED | OUTPATIENT
Start: 2019-09-14 | End: 2019-09-15

## 2019-09-14 RX ORDER — KETOROLAC TROMETHAMINE 10 MG/1
10 TABLET, FILM COATED ORAL EVERY 6 HOURS PRN
Qty: 20 TABLET | Refills: 0 | Status: SHIPPED | OUTPATIENT
Start: 2019-09-14 | End: 2022-07-14

## 2019-09-14 RX ORDER — AZITHROMYCIN 250 MG/1
1000 TABLET, FILM COATED ORAL ONCE
Status: COMPLETED | OUTPATIENT
Start: 2019-09-14 | End: 2019-09-14

## 2019-09-14 RX ORDER — METHYLPREDNISOLONE 4 MG/1
TABLET ORAL
Qty: 21 TABLET | Refills: 0 | Status: SHIPPED | OUTPATIENT
Start: 2019-09-14 | End: 2022-07-14

## 2019-09-14 RX ORDER — KETOROLAC TROMETHAMINE 30 MG/ML
30 INJECTION, SOLUTION INTRAMUSCULAR; INTRAVENOUS ONCE
Status: COMPLETED | OUTPATIENT
Start: 2019-09-14 | End: 2019-09-14

## 2019-09-14 RX ORDER — DEXAMETHASONE 4 MG/1
10 TABLET ORAL ONCE
Status: COMPLETED | OUTPATIENT
Start: 2019-09-14 | End: 2019-09-14

## 2019-09-14 RX ADMIN — LIDOCAINE HYDROCHLORIDE 250 MG: 10 INJECTION, SOLUTION EPIDURAL; INFILTRATION; INTRACAUDAL; PERINEURAL at 14:20

## 2019-09-14 RX ADMIN — DEXAMETHASONE 10 MG: 4 TABLET ORAL at 14:24

## 2019-09-14 RX ADMIN — KETOROLAC TROMETHAMINE 30 MG: 30 INJECTION, SOLUTION INTRAMUSCULAR at 14:17

## 2019-09-14 RX ADMIN — AZITHROMYCIN 1000 MG: 250 TABLET, FILM COATED ORAL at 14:18

## 2019-09-14 NOTE — ED PROVIDER NOTES
History  Chief Complaint   Patient presents with    Back Pain     Pt presents to ED from home w/ lower back pain radiating down right leg for 3 weeks  Pt denies loss of bladder or bowels  Pt (-) health hx      40-year-old female comes in today complaining of low back pain for the past 3 weeks which she describes as a burning sensation  She reports that she tried Tylenol without relief  Reports that she has had this in the past and was given pain relievers and muscle relaxers with relief  She reports that she lifts 40-50 lb repetitively throughout the day at work  Unsure if she injured her back in that way  She reports some vaginal odor as well and reports that she has been having unprotected sex with her ex- and is concern for STDs  Denies any dysuria  She reports a history of having her tubes tied and also taking oral contraceptive pills to prevent bleeding  She reports that she had her LMP 1 week ago          Prior to Admission Medications   Prescriptions Last Dose Informant Patient Reported? Taking?   norgestimate-ethinyl estradiol ([address] [address]) 0 25-35 MG-MCG per tablet   No Yes   Sig: Take 1 tablet by mouth daily      Facility-Administered Medications: None       Past Medical History:   Diagnosis Date    Anemia     Last assessed 4/10/2015    Heart murmur     Hemorrhoids     Migraine     Obesity     Last assessed 12/30/2013    Seasonal allergies        Past Surgical History:   Procedure Laterality Date    ABDOMINOPLASTY      HI HEMORRHOIDECTOMY,INT/EXT, 2+ COLUMNS/GROUPS N/A 3/10/2017    Procedure: HEMORRHOIDECTOMY ;  Surgeon: Shadi Bocanegra MD;  Location: [facility];  Service: Colorectal    TUBAL LIGATION         Family History   Problem Relation Age of Onset    Anemia Mother     Hypertension Mother     Down syndrome Brother     Diabetes Maternal Grandmother     Alzheimer's disease Paternal Grandmother      I have reviewed and agree with the history as documented      Social History     Tobacco Use    Smoking status: Never Smoker    Smokeless tobacco: Never Used   Substance Use Topics    Alcohol use: Yes     Comment: socially    Drug use: No        Review of Systems   Constitutional: Negative for activity change  Eyes: Negative for visual disturbance  Respiratory: Negative for shortness of breath  Cardiovascular: Negative for chest pain  Gastrointestinal: Positive for nausea  Genitourinary: Negative for dysuria and vaginal discharge  Musculoskeletal: Positive for back pain  Skin: Negative for color change  Neurological: Negative for dizziness and headaches  Psychiatric/Behavioral: Negative for behavioral problems  Physical Exam  Physical Exam   Constitutional: She is oriented to person, place, and time  She appears well-developed and well-nourished  She appears distressed  HENT:   Head: Normocephalic and atraumatic  Eyes: Conjunctivae and EOM are normal    Pulmonary/Chest: Effort normal  No respiratory distress  Abdominal: Soft  Bowel sounds are normal  She exhibits no distension  There is no tenderness  There is no guarding  Genitourinary:   Genitourinary Comments: White discharge, mild erythema, likely yeast   Musculoskeletal:        Lumbar back: She exhibits decreased range of motion, tenderness (Tenderness over sacrum diffusely) and pain  She exhibits no bony tenderness, no swelling, no edema and no deformity  Spasms: Bilateral paralumbar  Straight leg raise is negative bilaterally   Neurological: She is alert and oriented to person, place, and time  She has normal strength  She is not disoriented  No sensory deficit  Gait normal    Reflex Scores:       Patellar reflexes are 2+ on the right side and 2+ on the left side  Skin: Skin is warm and dry  No rash noted  Psychiatric: She has a normal mood and affect   Her behavior is normal        Vital Signs  ED Triage Vitals [09/14/19 1331]   Temperature Pulse Respirations Blood Pressure SpO2   98 3 °F (36 8 °C) 84 16 132/79 99 %      Temp Source Heart Rate Source Patient Position - Orthostatic VS BP Location FiO2 (%)   Oral Monitor Sitting Right arm --      Pain Score       8           Vitals:    09/14/19 1331   BP: 132/79   Pulse: 84   Patient Position - Orthostatic VS: Sitting         Visual Acuity      ED Medications  Medications   azithromycin (ZITHROMAX) tablet 1,000 mg (1,000 mg Oral Given 9/14/19 1418)   cefTRIAXone (ROCEPHIN) 250 mg in lidocaine (PF) (XYLOCAINE-MPF) 1 % IM only syringe (250 mg Intramuscular Given 9/14/19 1420)   ketorolac (TORADOL) injection 30 mg (30 mg Intramuscular Given 9/14/19 1417)   dexamethasone (DECADRON) tablet 10 mg (10 mg Oral Given 9/14/19 1424)       Diagnostic Studies  Results Reviewed     Procedure Component Value Units Date/Time    Chlamydia/GC amplified DNA by PCR [[id number]] Collected:  09/14/19 1421    Lab Status: In process Specimen:  Urine, Other Updated:  09/14/19 1423                 No orders to display              Procedures  Procedures       ED Course  ED Course as of Sep 14 1500   Sat Sep 14, 2019   1458 Reports back pain is improving  Will d/c home with toradol and medrol clau and flexeril  Advised no driving/operating heavy machinery  F/u with PCP in 1 week for continued pain                                  MDM    Disposition  Final diagnoses:   Acute low back pain   Yeast vaginitis     Time reflects when diagnosis was documented in both MDM as applicable and the Disposition within this note     Time User Action Codes Description Comment    9/14/2019  2:37 PM Noel Richardson Add [M54 5] Acute low back pain     9/14/2019  2:37 PM Noel Richardson Add [B37 3] Yeast vaginitis       ED Disposition     ED Disposition Condition Date/Time Comment    Discharge Stable Sat Sep 14, 2019  2:58 PM Brenda Pain discharge to home/self care              Follow-up Information     Follow up With Specialties Details Why Contact Info    Sarahi Xiong PA-C Internal Medicine, Physician Assistant Go in 1 week As needed [address] [facility]  [phone]            Patient's Medications   Discharge Prescriptions    CYCLOBENZAPRINE (FLEXERIL) 10 MG TABLET    Take 1 tablet (10 mg total) by mouth 2 (two) times a day as needed for muscle spasms       Start Date: 9/14/2019 End Date: --       Order Dose: 10 mg       Quantity: 20 tablet    Refills: 0    FLUCONAZOLE (DIFLUCAN) 150 MG TABLET    Take 1 tablet (150 mg total) by mouth daily for 1 day       Start Date: 9/14/2019 End Date: 9/15/2019       Order Dose: 150 mg       Quantity: 1 tablet    Refills: 0    KETOROLAC (TORADOL) 10 MG TABLET    Take 1 tablet (10 mg total) by mouth every 6 (six) hours as needed for moderate pain       Start Date: 9/14/2019 End Date: --       Order Dose: 10 mg       Quantity: 20 tablet    Refills: 0    METHYLPREDNISOLONE 4 MG TABLET THERAPY PACK    Use as directed on package       Start Date: 9/14/2019 End Date: --       Order Dose: --       Quantity: 21 tablet    Refills: 0     No discharge procedures on file      ED Provider  Electronically Signed by           Jaren Carranza PA-C  09/14/19 7073

## 2019-09-16 LAB
C TRACH DNA SPEC QL NAA+PROBE: NEGATIVE
N GONORRHOEA DNA SPEC QL NAA+PROBE: NEGATIVE

## 2020-02-25 ENCOUNTER — APPOINTMENT (EMERGENCY)
Dept: RADIOLOGY | Facility: HOSPITAL | Age: 30
End: 2020-02-25
Payer: COMMERCIAL

## 2020-02-25 ENCOUNTER — OFFICE VISIT (OUTPATIENT)
Dept: URGENT CARE | Age: 30
End: 2020-02-25
Payer: COMMERCIAL

## 2020-02-25 ENCOUNTER — HOSPITAL ENCOUNTER (EMERGENCY)
Facility: HOSPITAL | Age: 30
Discharge: HOME | End: 2020-02-25
Attending: EMERGENCY MEDICINE | Admitting: EMERGENCY MEDICINE
Payer: COMMERCIAL

## 2020-02-25 VITALS
DIASTOLIC BLOOD PRESSURE: 86 MMHG | HEIGHT: 64 IN | BODY MASS INDEX: 30.05 KG/M2 | SYSTOLIC BLOOD PRESSURE: 147 MMHG | RESPIRATION RATE: 16 BRPM | WEIGHT: 176 LBS | HEART RATE: 83 BPM | OXYGEN SATURATION: 100 % | TEMPERATURE: 97.9 F

## 2020-02-25 VITALS
HEART RATE: 77 BPM | RESPIRATION RATE: 18 BRPM | DIASTOLIC BLOOD PRESSURE: 86 MMHG | SYSTOLIC BLOOD PRESSURE: 128 MMHG | OXYGEN SATURATION: 100 % | TEMPERATURE: 97.5 F

## 2020-02-25 DIAGNOSIS — R11.0 NAUSEA: ICD-10-CM

## 2020-02-25 DIAGNOSIS — R19.7 DIARRHEA: ICD-10-CM

## 2020-02-25 DIAGNOSIS — R10.11 RIGHT UPPER QUADRANT ABDOMINAL PAIN: Primary | ICD-10-CM

## 2020-02-25 LAB
ALBUMIN SERPL BCP-MCNC: 3.7 G/DL (ref 3.5–5)
ALP SERPL-CCNC: 80 U/L (ref 46–116)
ALT SERPL W P-5'-P-CCNC: 21 U/L (ref 12–78)
ANION GAP SERPL CALCULATED.3IONS-SCNC: 5 MMOL/L (ref 4–13)
AST SERPL W P-5'-P-CCNC: 11 U/L (ref 5–45)
BASOPHILS # BLD AUTO: 0.09 THOUSANDS/ΜL (ref 0–0.1)
BASOPHILS NFR BLD AUTO: 1 % (ref 0–1)
BILIRUB SERPL-MCNC: 0.42 MG/DL (ref 0.2–1)
BILIRUB UR QL STRIP: NEGATIVE
BUN SERPL-MCNC: 12 MG/DL (ref 5–25)
CALCIUM SERPL-MCNC: 8.7 MG/DL (ref 8.3–10.1)
CHLORIDE SERPL-SCNC: 108 MMOL/L (ref 100–108)
CLARITY UR: CLEAR
CO2 SERPL-SCNC: 25 MMOL/L (ref 21–32)
COLOR UR: YELLOW
COLOR, POC: NORMAL
CREAT SERPL-MCNC: 0.66 MG/DL (ref 0.6–1.3)
EOSINOPHIL # BLD AUTO: 0.24 THOUSAND/ΜL (ref 0–0.61)
EOSINOPHIL NFR BLD AUTO: 3 % (ref 0–6)
ERYTHROCYTE [DISTWIDTH] IN BLOOD BY AUTOMATED COUNT: 13.3 % (ref 11.6–15.1)
EXT PREG TEST URINE: NEGATIVE
EXT. CONTROL ED NAV: NORMAL
GFR SERPL CREATININE-BSD FRML MDRD: 119 ML/MIN/1.73SQ M
GLUCOSE SERPL-MCNC: 84 MG/DL (ref 65–140)
GLUCOSE UR STRIP-MCNC: NEGATIVE MG/DL
HCT VFR BLD AUTO: 38.7 % (ref 34.8–46.1)
HGB BLD-MCNC: 12.8 G/DL (ref 11.5–15.4)
HGB UR QL STRIP.AUTO: NEGATIVE
IMM GRANULOCYTES # BLD AUTO: 0.02 THOUSAND/UL (ref 0–0.2)
IMM GRANULOCYTES NFR BLD AUTO: 0 % (ref 0–2)
KETONES UR STRIP-MCNC: NEGATIVE MG/DL
LEUKOCYTE ESTERASE UR QL STRIP: NEGATIVE
LIPASE SERPL-CCNC: 103 U/L (ref 73–393)
LYMPHOCYTES # BLD AUTO: 2.31 THOUSANDS/ΜL (ref 0.6–4.47)
LYMPHOCYTES NFR BLD AUTO: 32 % (ref 14–44)
MCH RBC QN AUTO: 29.4 PG (ref 26.8–34.3)
MCHC RBC AUTO-ENTMCNC: 33.1 G/DL (ref 31.4–37.4)
MCV RBC AUTO: 89 FL (ref 82–98)
MONOCYTES # BLD AUTO: 0.41 THOUSAND/ΜL (ref 0.17–1.22)
MONOCYTES NFR BLD AUTO: 6 % (ref 4–12)
NEUTROPHILS # BLD AUTO: 4.24 THOUSANDS/ΜL (ref 1.85–7.62)
NEUTS SEG NFR BLD AUTO: 58 % (ref 43–75)
NITRITE UR QL STRIP: NEGATIVE
NRBC BLD AUTO-RTO: 0 /100 WBCS
PH UR STRIP.AUTO: 7 [PH] (ref 4.5–8)
PLATELET # BLD AUTO: 317 THOUSANDS/UL (ref 149–390)
PMV BLD AUTO: 9.8 FL (ref 8.9–12.7)
POTASSIUM SERPL-SCNC: 3.4 MMOL/L (ref 3.5–5.3)
PROT SERPL-MCNC: 8.4 G/DL (ref 6.4–8.2)
PROT UR STRIP-MCNC: NEGATIVE MG/DL
RBC # BLD AUTO: 4.36 MILLION/UL (ref 3.81–5.12)
SODIUM SERPL-SCNC: 138 MMOL/L (ref 136–145)
SP GR UR STRIP.AUTO: 1.02 (ref 1–1.03)
UROBILINOGEN UR QL STRIP.AUTO: 0.2 E.U./DL
WBC # BLD AUTO: 7.31 THOUSAND/UL (ref 4.31–10.16)

## 2020-02-25 PROCEDURE — 81025 URINE PREGNANCY TEST: CPT | Performed by: EMERGENCY MEDICINE

## 2020-02-25 PROCEDURE — 81003 URINALYSIS AUTO W/O SCOPE: CPT

## 2020-02-25 PROCEDURE — 99285 EMERGENCY DEPT VISIT HI MDM: CPT | Performed by: EMERGENCY MEDICINE

## 2020-02-25 PROCEDURE — 96374 THER/PROPH/DIAG INJ IV PUSH: CPT

## 2020-02-25 PROCEDURE — 76775 US EXAM ABDO BACK WALL LIM: CPT | Performed by: EMERGENCY MEDICINE

## 2020-02-25 PROCEDURE — 99284 EMERGENCY DEPT VISIT MOD MDM: CPT

## 2020-02-25 PROCEDURE — 36415 COLL VENOUS BLD VENIPUNCTURE: CPT | Performed by: EMERGENCY MEDICINE

## 2020-02-25 PROCEDURE — 85025 COMPLETE CBC W/AUTO DIFF WBC: CPT | Performed by: EMERGENCY MEDICINE

## 2020-02-25 PROCEDURE — 99213 OFFICE O/P EST LOW 20 MIN: CPT | Performed by: FAMILY MEDICINE

## 2020-02-25 PROCEDURE — 80053 COMPREHEN METABOLIC PANEL: CPT | Performed by: EMERGENCY MEDICINE

## 2020-02-25 PROCEDURE — 83690 ASSAY OF LIPASE: CPT | Performed by: EMERGENCY MEDICINE

## 2020-02-25 RX ORDER — KETOROLAC TROMETHAMINE 30 MG/ML
15 INJECTION, SOLUTION INTRAMUSCULAR; INTRAVENOUS ONCE
Status: COMPLETED | OUTPATIENT
Start: 2020-02-25 | End: 2020-02-25

## 2020-02-25 RX ORDER — ACETAMINOPHEN 325 MG/1
975 TABLET ORAL ONCE
Status: COMPLETED | OUTPATIENT
Start: 2020-02-25 | End: 2020-02-25

## 2020-02-25 RX ADMIN — ACETAMINOPHEN 975 MG: 325 TABLET ORAL at 16:45

## 2020-02-25 RX ADMIN — KETOROLAC TROMETHAMINE 15 MG: 30 INJECTION, SOLUTION INTRAMUSCULAR at 17:11

## 2020-02-25 NOTE — ED ATTENDING ATTESTATION
2/25/2020  IFifi MD, saw and evaluated the patient  I have discussed the patient with the resident/non-physician practitioner and agree with the resident's/non-physician practitioner's findings, Plan of Care, and MDM as documented in the resident's/non-physician practitioner's note, except where noted  All available labs and Radiology studies were reviewed  I was present for key portions of any procedure(s) performed by the resident/non-physician practitioner and I was immediately available to provide assistance  At this point I agree with the current assessment done in the Emergency Department  I have conducted an independent evaluation of this patient a history and physical is as follows:    ED Course     13-year-old female presenting to the emergency department for evaluation of right mid to right upper quadrant abdominal pain  Initially more epigastric and poorly characterized, slightly achy, has now become more sharp and has continued constantly since last night  Positive nausea without vomiting  Patient reports having some loose diarrhea type bowel movements  No urinary symptoms  Ten systems reviewed negative except as noted  The patient is resting comfortably on a stretcher in no acute respiratory distress  The patient appears nontoxic  HEENT reveals moist mucous membranes  Head is normocephalic and atraumatic  Conjunctiva and sclera are normal  Neck is nontender and supple with full range of motion to flexion, extension, lateral rotation  No meningismus appreciated  No masses are appreciated  Lungs are clear to auscultation bilaterally without any wheezes, rales or rhonchi  Heart is regular rate and rhythm without any murmurs, rubs or gallops  Abdomen is nondistended, soft with tenderness to palpation in the right mid and right upper quadrant  Extremities appear grossly normal without any significant arthropathy  Patient is awake, alert, and oriented x3   The patient has normal interaction  Motor is 5 out of 5  Assessment and plan:  69-year-old female presenting to the emergency department for evaluation of right upper quadrant abdominal pain  Bedside ultrasound was utilized and demonstrated a decompressed gallbladder without any visualized gallstones  Patient will be further evaluated with CT  Labs Reviewed   COMPREHENSIVE METABOLIC PANEL - Abnormal       Result Value Ref Range Status    Sodium 138  136 - 145 mmol/L Final    Potassium 3 4 (*) 3 5 - 5 3 mmol/L Final    Chloride 108  100 - 108 mmol/L Final    CO2 25  21 - 32 mmol/L Final    ANION GAP 5  4 - 13 mmol/L Final    BUN 12  5 - 25 mg/dL Final    Creatinine 0 66  0 60 - 1 30 mg/dL Final    Comment: Standardized to IDMS reference method    Glucose 84  65 - 140 mg/dL Final    Comment:   If the patient is fasting, the ADA then defines impaired fasting glucose as > 100 mg/dL and diabetes as > or equal to 123 mg/dL  Specimen collection should occur prior to Sulfasalazine administration due to the potential for falsely depressed results  Specimen collection should occur prior to Sulfapyridine administration due to the potential for falsely elevated results  Calcium 8 7  8 3 - 10 1 mg/dL Final    AST 11  5 - 45 U/L Final    Comment:   Specimen collection should occur prior to Sulfasalazine administration due to the potential for falsely depressed results  ALT 21  12 - 78 U/L Final    Comment:   Specimen collection should occur prior to Sulfasalazine and/or Sulfapyridine administration due to the potential for falsely depressed results       Alkaline Phosphatase 80  46 - 116 U/L Final    Total Protein 8 4 (*) 6 4 - 8 2 g/dL Final    Albumin 3 7  3 5 - 5 0 g/dL Final    Total Bilirubin 0 42  0 20 - 1 00 mg/dL Final    eGFR 119  ml/min/1 73sq m Final    Narrative:     [place] guidelines for Chronic Kidney Disease (CKD):     Stage 1 with normal or high GFR (GFR > 90 mL/min/1 73 square meters)    Stage 2 Mild CKD (GFR = 60-89 mL/min/1 73 square meters)    Stage 3A Moderate CKD (GFR = 45-59 mL/min/1 73 square meters)    Stage 3B Moderate CKD (GFR = 30-44 mL/min/1 73 square meters)    Stage 4 Severe CKD (GFR = 15-29 mL/min/1 73 square meters)    Stage 5 End Stage CKD (GFR <15 mL/min/1 73 square meters)  Note: GFR calculation is accurate only with a steady state creatinine   LIPASE - Normal    Lipase 103  73 - 393 u/L Final   POCT PREGNANCY, URINE - Normal    EXT PREG TEST UR (Ref: Negative) negative   Final    Control valid   Final   POCT URINALYSIS DIPSTICK - Normal    Color, UA see results   Final   CBC AND DIFFERENTIAL    WBC 7 31  4 31 - 10 16 Thousand/uL Final    RBC 4 36  3 81 - 5 12 Million/uL Final    Hemoglobin 12 8  11 5 - 15 4 g/dL Final    Hematocrit 38 7  34 8 - 46 1 % Final    MCV 89  82 - 98 fL Final    MCH 29 4  26 8 - 34 3 pg Final    MCHC 33 1  31 4 - 37 4 g/dL Final    RDW 13 3  11 6 - 15 1 % Final    MPV 9 8  8 9 - 12 7 fL Final    Platelets 280  262 - 390 Thousands/uL Final    nRBC 0  /100 WBCs Final    Neutrophils Relative 58  43 - 75 % Final    Immat GRANS % 0  0 - 2 % Final    Lymphocytes Relative 32  14 - 44 % Final    Monocytes Relative 6  4 - 12 % Final    Eosinophils Relative 3  0 - 6 % Final    Basophils Relative 1  0 - 1 % Final    Neutrophils Absolute 4 24  1 85 - 7 62 Thousands/µL Final    Immature Grans Absolute 0 02  0 00 - 0 20 Thousand/uL Final    Lymphocytes Absolute 2 31  0 60 - 4 47 Thousands/µL Final    Monocytes Absolute 0 41  0 17 - 1 22 Thousand/µL Final    Eosinophils Absolute 0 24  0 00 - 0 61 Thousand/µL Final    Basophils Absolute 0 09  0 00 - 0 10 Thousands/µL Final   URINE MACROSCOPIC, POC    Color, UA Yellow   Final    Clarity, UA Clear   Final    pH, UA 7 0  4 5 - 8 0 Final    Leukocytes, UA Negative  Negative Final    Nitrite, UA Negative  Negative Final    Protein, UA Negative  Negative mg/dl Final    Glucose, UA Negative  Negative mg/dl Final    Ketones, UA Negative  Negative mg/dl Final    Urobilinogen, UA 0 2  0 2, 1 0 E U /dl E U /dl Final    Bilirubin, UA Negative  Negative Final    Blood, UA Negative  Negative Final    Specific Gravity, UA 1 025  1 003 - 1 030 Final    Narrative:     CLINITEK RESULT       CT abdomen pelvis with contrast    (Results Pending)     Patient was improved after receiving Toradol  Did not want to remain in the emergency department for further evaluation including CT  Patient will be given return precautions for potential appendicitis with instructions to return if she develops right lower quadrant abdominal pain         Critical Care Time  Procedures

## 2020-02-25 NOTE — DISCHARGE INSTRUCTIONS
You came to emergency department today with some abdominal pain  We checked blood work and gave you some pain medications with resolution of your symptoms  We did a bedside ultrasound of your gallbladder which was normal   Please follow-up with your primary care provider  Please return if you have significant worsening of your abdominal pain, have profuse vomiting, blood in your stool or vomit, developed fevers, or any other concerning signs or symptoms  Please refer to following documents for additional instructions and return precautions

## 2020-02-25 NOTE — PROGRESS NOTES
[facility]        NAME: Negrito Ding is a 26 y o  female  : 1990    MRN: [id number]  DATE: 2020  TIME: 3:28 PM    Assessment and Plan   Right upper quadrant abdominal pain [R10 11]  1  Right upper quadrant abdominal pain  Transfer to other facility         Patient Instructions     Patient Instructions   Concerned about patient's severe right upper quadrant abdominal pain after eating breakfast this morning (turkey and cheese sandwich) - possible acute cholecystitis; will send patient to the [facility] emergency department now by private vehicle for further evaluation care  Chief Complaint     Chief Complaint   Patient presents with    Abdominal Pain      sharp RUQ pain x today, after breakfast , denies N/V/D         History of Present Illness       Patient with severe right upper quadrant abdominal pain after eating breakfast this morning (turkey and cheese sandwich); patient denies vomiting or diarrhea      Review of Systems   Review of Systems   Gastrointestinal: Positive for abdominal pain  Negative for diarrhea and vomiting  All other systems reviewed and are negative          Current Medications       Current Outpatient Medications:     cyclobenzaprine (FLEXERIL) 10 mg tablet, Take 1 tablet (10 mg total) by mouth 2 (two) times a day as needed for muscle spasms (Patient not taking: Reported on 2020), Disp: 20 tablet, Rfl: 0    ketorolac (TORADOL) 10 mg tablet, Take 1 tablet (10 mg total) by mouth every 6 (six) hours as needed for moderate pain (Patient not taking: Reported on 2020), Disp: 20 tablet, Rfl: 0    methylPREDNISolone 4 MG tablet therapy pack, Use as directed on package (Patient not taking: Reported on 2020), Disp: 21 tablet, Rfl: 0    norgestimate-ethinyl estradiol (SPRINTEC 28) 0 25-35 MG-MCG per tablet, Take 1 tablet by mouth daily, Disp: 28 tablet, Rfl: 11    Current Allergies     Allergies as of 02/25/2020 - Reviewed 02/25/2020   Allergen Reaction Noted    Pollen extract  04/20/2018            The following portions of the patient's history were reviewed and updated as appropriate: allergies, current medications, past family history, past medical history, past social history, past surgical history and problem list      Past Medical History:   Diagnosis Date    Anemia     Last assessed 4/10/2015    Heart murmur     Hemorrhoids     Migraine     Obesity     Last assessed 12/30/2013    Seasonal allergies        Past Surgical History:   Procedure Laterality Date    ABDOMINOPLASTY      [place] HEMORRHOIDECTOMY,INT/EXT, 2+ COLUMNS/GROUPS N/A 3/10/2017    Procedure: HEMORRHOIDECTOMY ;  Surgeon: Deric Mustafa MD;  [facility];  Service: Colorectal    TUBAL LIGATION         Family History   Problem Relation Age of Onset    Anemia Mother     Hypertension Mother     Down syndrome Brother     Diabetes Maternal Grandmother     Alzheimer's disease Paternal Grandmother          Medications have been verified  Objective   /86   Pulse 77   Temp 97 5 °F (36 4 °C) (Temporal)   Resp 18   LMP 02/19/2020   SpO2 100%        Physical Exam     Physical Exam   Constitutional: She is oriented to person, place, and time  She appears well-developed and well-nourished  HENT:   Mouth/Throat: Oropharynx is clear and moist    Cardiovascular: Normal rate, regular rhythm and normal heart sounds  Pulmonary/Chest: Effort normal and breath sounds normal    Abdominal: Soft  Normal appearance and bowel sounds are normal  There is tenderness in the right upper quadrant  There is guarding  There is no rigidity  Neurological: She is alert and oriented to person, place, and time  No nuchal rigidity   Skin:   Good color and turgor   Psychiatric: She has a normal mood and affect  Her behavior is normal    Nursing note and vitals reviewed

## 2020-02-25 NOTE — ED PROVIDER NOTES
History  Chief Complaint   Patient presents with    Abdominal Pain     right upper abd pain and diarrhea since yesterday  pt states it comes and goes  34-year-old female no past history presenting with 1 day history of right upper quadrant pain  Patient stated yesterday she developed some general malaise that progressed into some epigastric and upper quadrant pressure  She stated the symptoms worsened throughout the night and upon waking this morning she has been having intermittent severe right upper quadrant sharp stabbing pain with occasional nausea without vomiting  She also notes occasional loose bowel movements with last bowel movement this morning  She also notes dysuria for the past day or so and some chills  Patient tolerating normal p o  No sick contacts  No hematochezia or melena  Only abdominal surgery was a tubal ligation a few years ago  She denies any other abdominal surgery  She denies any other symptoms  She denies any headache, vision changes, chest pain, or shortness of breath  Prior to Admission Medications   Prescriptions Last Dose Informant Patient Reported? Taking?    cyclobenzaprine (FLEXERIL) 10 mg tablet   No No   Sig: Take 1 tablet (10 mg total) by mouth 2 (two) times a day as needed for muscle spasms   Patient not taking: Reported on 2/25/2020   ketorolac (TORADOL) 10 mg tablet   No No   Sig: Take 1 tablet (10 mg total) by mouth every 6 (six) hours as needed for moderate pain   Patient not taking: Reported on 2/25/2020   methylPREDNISolone 4 MG tablet therapy pack   No No   Sig: Use as directed on package   Patient not taking: Reported on 2/25/2020   norgestimate-ethinyl estradiol ([address] [id number]) 0 25-35 MG-MCG per tablet Not Taking at Unknown time  No No   Sig: Take 1 tablet by mouth daily   Patient not taking: Reported on 2/25/2020      Facility-Administered Medications: None       Past Medical History:   Diagnosis Date    Anemia     Last assessed 4/10/2015   Jie Pope Heart murmur     Hemorrhoids     Migraine     Obesity     Last assessed 12/30/2013    Seasonal allergies        Past Surgical History:   Procedure Laterality Date    ABDOMINOPLASTY      [place] HEMORRHOIDECTOMY,INT/EXT, 2+ COLUMNS/GROUPS N/A 3/10/2017    Procedure: HEMORRHOIDECTOMY ;  Surgeon: Zana Soto MD;  [facility];  Service: Colorectal    TUBAL LIGATION         Family History   Problem Relation Age of Onset    Anemia Mother     Hypertension Mother     Down syndrome Brother     Diabetes Maternal Grandmother     Alzheimer's disease Paternal Grandmother      I have reviewed and agree with the history as documented  E-Cigarette/Vaping     E-Cigarette/Vaping Substances     Social History     Tobacco Use    Smoking status: Never Smoker    Smokeless tobacco: Never Used   Substance Use Topics    Alcohol use: Yes     Comment: socially    Drug use: No        Review of Systems   Constitutional: Positive for chills  Negative for appetite change, diaphoresis, fever and unexpected weight change  HENT: Negative for congestion and rhinorrhea  Eyes: Negative for photophobia and visual disturbance  Respiratory: Negative for cough, chest tightness and shortness of breath  Cardiovascular: Negative for chest pain, palpitations and leg swelling  Gastrointestinal: Positive for abdominal pain, diarrhea and nausea  Negative for abdominal distention, blood in stool, constipation and vomiting  Genitourinary: Positive for dysuria  Negative for hematuria  Musculoskeletal: Negative for back pain, joint swelling, neck pain and neck stiffness  Skin: Negative for color change, pallor, rash and wound  Neurological: Negative for dizziness, syncope, weakness, light-headedness and headaches  Psychiatric/Behavioral: Negative for agitation  All other systems reviewed and are negative        Physical Exam  ED Triage Vitals   Temperature Pulse Respirations Blood Pressure SpO2   02/25/20 1626 02/25/20 1622 02/25/20 1622 02/25/20 1622 02/25/20 1624   97 9 °F (36 6 °C) 83 16 147/86 100 %      Temp Source Heart Rate Source Patient Position - Orthostatic VS BP Location FiO2 (%)   02/25/20 1626 02/25/20 1622 -- -- --   Oral Monitor         Pain Score       02/25/20 1621       8             Orthostatic Vital Signs  Vitals:    02/25/20 1622   BP: 147/86   Pulse: 83       Physical Exam   Constitutional: She is oriented to person, place, and time  She appears well-developed and well-nourished  HENT:   Head: Normocephalic and atraumatic  Nose: Nose normal    Mouth/Throat: Oropharynx is clear and moist    Eyes: Pupils are equal, round, and reactive to light  EOM are normal  Right eye exhibits no discharge  Left eye exhibits no discharge  Neck: Normal range of motion  Neck supple  No JVD present  No tracheal deviation present  Cardiovascular: Normal rate, regular rhythm, normal heart sounds and intact distal pulses  Exam reveals no gallop and no friction rub  No murmur heard  Pulmonary/Chest: Effort normal and breath sounds normal  No stridor  No respiratory distress  She has no wheezes  She has no rales  She exhibits no tenderness  Abdominal: Soft  Bowel sounds are normal  She exhibits no distension  There is tenderness in the right upper quadrant, right lower quadrant and epigastric area  There is guarding  There is no rebound  Normal bowel sounds  Mild tenderness to palpation epigastric and right lower quadrant  Moderate tenderness to palpation and mild guarding in the right upper quadrant  Musculoskeletal: Normal range of motion  She exhibits no edema, tenderness or deformity  Lymphadenopathy:     She has no cervical adenopathy  Neurological: She is alert and oriented to person, place, and time  No cranial nerve deficit or sensory deficit  She exhibits normal muscle tone  Coordination normal    Skin: Skin is warm and dry  No rash noted  She is not diaphoretic  No erythema  No pallor  Psychiatric: She has a normal mood and affect  Her behavior is normal  Thought content normal    Nursing note and vitals reviewed        ED Medications  Medications   acetaminophen (TYLENOL) tablet 975 mg (975 mg Oral Given 2/25/20 1645)   ketorolac (TORADOL) injection 15 mg (15 mg Intravenous Given 2/25/20 1711)       Diagnostic Studies  Results Reviewed     Procedure Component Value Units Date/Time    Lipase [[id number]]  (Normal) Collected:  02/25/20 1645    Lab Status:  Final result Specimen:  Blood from Arm, Left Updated:  02/25/20 1713     Lipase 103 u/L     Comprehensive metabolic panel [[id number]]  (Abnormal) Collected:  02/25/20 1645    Lab Status:  Final result Specimen:  Blood from Arm, Left Updated:  02/25/20 1713     Sodium 138 mmol/L      Potassium 3 4 mmol/L      Chloride 108 mmol/L      CO2 25 mmol/L      ANION GAP 5 mmol/L      BUN 12 mg/dL      Creatinine 0 66 mg/dL      Glucose 84 mg/dL      Calcium 8 7 mg/dL      AST 11 U/L      ALT 21 U/L      Alkaline Phosphatase 80 U/L      Total Protein 8 4 g/dL      Albumin 3 7 g/dL      Total Bilirubin 0 42 mg/dL      eGFR 119 ml/min/1 73sq m     Narrative:       [place] guidelines for Chronic Kidney Disease (CKD):     Stage 1 with normal or high GFR (GFR > 90 mL/min/1 73 square meters)    Stage 2 Mild CKD (GFR = 60-89 mL/min/1 73 square meters)    Stage 3A Moderate CKD (GFR = 45-59 mL/min/1 73 square meters)    Stage 3B Moderate CKD (GFR = 30-44 mL/min/1 73 square meters)    Stage 4 Severe CKD (GFR = 15-29 mL/min/1 73 square meters)    Stage 5 End Stage CKD (GFR <15 mL/min/1 73 square meters)  Note: GFR calculation is accurate only with a steady state creatinine    CBC and differential [[id number]] Collected:  02/25/20 1645    Lab Status:  Final result Specimen:  Blood from Arm, Left Updated:  02/25/20 1701     WBC 7 31 Thousand/uL      RBC 4 36 Million/uL      Hemoglobin 12 8 g/dL      Hematocrit 38 7 %      MCV 89 fL MCH 29 4 pg      MCHC 33 1 g/dL      RDW 13 3 %      MPV 9 8 fL      Platelets 080 Thousands/uL      nRBC 0 /100 WBCs      Neutrophils Relative 58 %      Immat GRANS % 0 %      Lymphocytes Relative 32 %      Monocytes Relative 6 %      Eosinophils Relative 3 %      Basophils Relative 1 %      Neutrophils Absolute 4 24 Thousands/µL      Immature Grans Absolute 0 02 Thousand/uL      Lymphocytes Absolute 2 31 Thousands/µL      Monocytes Absolute 0 41 Thousand/µL      Eosinophils Absolute 0 24 Thousand/µL      Basophils Absolute 0 09 Thousands/µL     POCT urinalysis dipstick [[id number]]  (Normal) Resulted:  02/25/20 1642    Lab Status:  Final result Specimen:  Urine Updated:  02/25/20 1643     Color, UA see results    POCT pregnancy, urine [[id number]]  (Normal) Resulted:  02/25/20 1642    Lab Status:  Final result Updated:  02/25/20 1642     EXT PREG TEST UR (Ref: Negative) negative     Control valid    Urine Macroscopic, POC [[id number]] Collected:  02/25/20 1646    Lab Status:  Final result Specimen:  Urine Updated:  02/25/20 1642     Color, UA Yellow     Clarity, UA Clear     pH, UA 7 0     Leukocytes, UA Negative     Nitrite, UA Negative     Protein, UA Negative mg/dl      Glucose, UA Negative mg/dl      Ketones, UA Negative mg/dl      Urobilinogen, UA 0 2 E U /dl      Bilirubin, UA Negative     Blood, UA Negative     Specific Gravity, UA 1 025    Narrative:       CLINITEK RESULT                 No orders to display         Procedures  Procedures      ED Course                               MDM  Number of Diagnoses or Management Options  Diarrhea:   Nausea:   Right upper quadrant abdominal pain:   Diagnosis management comments: 79-year-old female no past history presenting with 1 day history of right upper quadrant pain  Patient with progressively worsening right upper quadrant pain who is afebrile and not tachycardic in the setting of no melena or hematochezia and prior tubal ligation, cholelithiasis is possible  Tubal ligation does not completely rule out pregnancy so plan to obtain urine preg  With dysuria will also obtain urinalysis  We will also obtain labs including abdominal labs and lipase  Bedside ultrasound  Pain medication  Labs within normal limits  Bedside ultrasound showing normal gallbladder  Urine pregnancy negative and urinalysis negative  Patient's pain completely resolved after medication and now patient only minimally tender palpation with palpation right upper quadrant  Patient now requesting to go home before the CT scan  Shared decision-making with the patient and patient opted to still go home without scan  Patient given return precautions and instructions  Patient advised to follow-up primary care provider  Patient discharged  Amount and/or Complexity of Data Reviewed  Clinical lab tests: ordered and reviewed  Tests in the radiology section of CPT®: ordered and reviewed  Tests in the medicine section of CPT®: ordered and reviewed  Review and summarize past medical records: yes  Independent visualization of images, tracings, or specimens: yes    Patient Progress  Patient progress: improved        Disposition  Final diagnoses:   Right upper quadrant abdominal pain   Diarrhea   Nausea     Time reflects when diagnosis was documented in both MDM as applicable and the Disposition within this note     Time User Action Codes Description Comment    2/25/2020  5:37 PM Siena Ying Add [R10 11] Right upper quadrant abdominal pain     2/25/2020  5:37 PM Siena Ying Add [R19 7] Diarrhea     2/25/2020  5:37 PM Siena Ying Add [R11 0] Nausea       ED Disposition     ED Disposition Condition Date/Time Comment    Discharge Stable Tue Feb 25, 2020  5:39 PM Hannah Mason discharge to home/self care              Follow-up Information     Follow up With Specialties Details Why Contact Info Additional Information    Pablo Meyer PA-C Internal Medicine, Physician Assistant Schedule an appointment as soon as possible for a visit   [phone] [address]  [address] Emergency Department Emergency Medicine Go to  If symptoms worsen [address]  [phone]  ED, [address], [place], [place], [address] [phone]          Discharge Medication List as of 2/25/2020  5:40 PM      CONTINUE these medications which have NOT CHANGED    Details   cyclobenzaprine (FLEXERIL) 10 mg tablet Take 1 tablet (10 mg total) by mouth 2 (two) times a day as needed for muscle spasms, Starting Sat 9/14/2019, Normal      ketorolac (TORADOL) 10 mg tablet Take 1 tablet (10 mg total) by mouth every 6 (six) hours as needed for moderate pain, Starting Sat 9/14/2019, Normal      methylPREDNISolone 4 MG tablet therapy pack Use as directed on package, Normal      norgestimate-ethinyl estradiol (SPRINTEC 28) 0 25-35 MG-MCG per tablet Take 1 tablet by mouth daily, Starting Thu 7/25/2019, Normal           No discharge procedures on file  PDMP Review     None           ED Provider  Attending physically available and evaluated Gavinjenny Earl RHOADES managed the patient along with the ED Attending      Electronically Signed by         Kianna Pina MD  02/25/20 2638

## 2020-02-25 NOTE — PATIENT INSTRUCTIONS
Concerned about patient's severe right upper quadrant abdominal pain after eating breakfast this morning (turkey and cheese sandwich) - possible acute cholecystitis; will send patient to the [facility] emergency department now by private vehicle for further evaluation care

## 2020-03-06 ENCOUNTER — HOSPITAL ENCOUNTER (EMERGENCY)
Facility: HOSPITAL | Age: 30
Discharge: HOME | End: 2020-03-06
Attending: EMERGENCY MEDICINE | Admitting: EMERGENCY MEDICINE
Payer: COMMERCIAL

## 2020-03-06 VITALS
SYSTOLIC BLOOD PRESSURE: 136 MMHG | BODY MASS INDEX: 30.21 KG/M2 | TEMPERATURE: 97.6 F | RESPIRATION RATE: 18 BRPM | DIASTOLIC BLOOD PRESSURE: 74 MMHG | HEART RATE: 94 BPM | WEIGHT: 176 LBS | OXYGEN SATURATION: 99 %

## 2020-03-06 DIAGNOSIS — J40 BRONCHITIS: Primary | ICD-10-CM

## 2020-03-06 PROCEDURE — 99284 EMERGENCY DEPT VISIT MOD MDM: CPT | Performed by: EMERGENCY MEDICINE

## 2020-03-06 PROCEDURE — 99283 EMERGENCY DEPT VISIT LOW MDM: CPT

## 2020-03-06 RX ORDER — AZITHROMYCIN 250 MG/1
500 TABLET, FILM COATED ORAL ONCE
Status: COMPLETED | OUTPATIENT
Start: 2020-03-06 | End: 2020-03-06

## 2020-03-06 RX ORDER — AZITHROMYCIN 250 MG/1
250 TABLET, FILM COATED ORAL DAILY
Qty: 4 TABLET | Refills: 0 | Status: SHIPPED | OUTPATIENT
Start: 2020-03-07 | End: 2020-03-11

## 2020-03-06 RX ADMIN — AZITHROMYCIN 500 MG: 250 TABLET, FILM COATED ORAL at 08:07

## 2020-03-06 NOTE — ED PROVIDER NOTES
History  Chief Complaint   Patient presents with    Cough     pt reports cough since friday, worsening yesterday  also reports head pressure  denies fevers  Patient is a 26year old female with worsening cough since this past Friday  Just got back from [place] this week  No fever  No N/V  (+) head pressure  (+) dizziness  No diarrhea  No urinary sx  LMP - 2/20/20  Was last seen at [facility] ED on 2/25/20 for RUQ pain  [facility] [place] [facility] website checked on this patient and no Rx found  History provided by:  Patient   used: No    Cough   Associated symptoms: headaches (head pressure)    Associated symptoms: no fever        Prior to Admission Medications   Prescriptions Last Dose Informant Patient Reported? Taking?    cyclobenzaprine (FLEXERIL) 10 mg tablet   No No   Sig: Take 1 tablet (10 mg total) by mouth 2 (two) times a day as needed for muscle spasms   Patient not taking: Reported on 2/25/2020   ketorolac (TORADOL) 10 mg tablet   No No   Sig: Take 1 tablet (10 mg total) by mouth every 6 (six) hours as needed for moderate pain   Patient not taking: Reported on 2/25/2020   methylPREDNISolone 4 MG tablet therapy pack   No No   Sig: Use as directed on package   Patient not taking: Reported on 2/25/2020   norgestimate-ethinyl estradiol ([address] [address]) 0 25-35 MG-MCG per tablet   No No   Sig: Take 1 tablet by mouth daily   Patient not taking: Reported on 2/25/2020      Facility-Administered Medications: None       Past Medical History:   Diagnosis Date    Anemia     Last assessed 4/10/2015    Heart murmur     Hemorrhoids     Migraine     Obesity     Last assessed 12/30/2013    Seasonal allergies        Past Surgical History:   Procedure Laterality Date    ABDOMINOPLASTY      SC HEMORRHOIDECTOMY,INT/EXT, 2+ COLUMNS/GROUPS N/A 3/10/2017    Procedure: HEMORRHOIDECTOMY ;  Surgeon: Dennie Bake, MD;  [facility];  Service: Colorectal    TUBAL LIGATION         Family History   Problem Relation Age of Onset    Anemia Mother     Hypertension Mother     Down syndrome Brother     Diabetes Maternal Grandmother     Alzheimer's disease Paternal Grandmother      I have reviewed and agree with the history as documented  E-Cigarette/Vaping     E-Cigarette/Vaping Substances     Social History     Tobacco Use    Smoking status: Never Smoker    Smokeless tobacco: Never Used   Substance Use Topics    Alcohol use: Yes     Comment: socially    Drug use: No       Review of Systems   Constitutional: Negative for fever  Respiratory: Positive for cough  Gastrointestinal: Negative for diarrhea, nausea and vomiting  Neurological: Positive for dizziness and headaches (head pressure)  All other systems reviewed and are negative  Physical Exam  Physical Exam   Constitutional: She is oriented to person, place, and time  She appears well-developed and well-nourished  She appears distressed (mild)  HENT:   Head: Normocephalic and atraumatic  Right Ear: External ear normal    Left Ear: External ear normal    Mouth/Throat: Oropharynx is clear and moist  No oropharyngeal exudate  nontender sinuses   Eyes: Right eye exhibits no discharge  Left eye exhibits no discharge  No scleral icterus  Neck: Normal range of motion  Neck supple  No tracheal deviation present  Cardiovascular: Normal rate, regular rhythm and normal heart sounds  No murmur heard  Pulmonary/Chest: Effort normal and breath sounds normal  No stridor  No respiratory distress  She has no wheezes  She has no rales  Abdominal: Soft  Bowel sounds are normal  There is no tenderness  Musculoskeletal: She exhibits no edema or deformity  Neurological: She is alert and oriented to person, place, and time  Skin: Skin is warm and dry  No rash noted  Psychiatric: She has a normal mood and affect  Nursing note and vitals reviewed        Vital Signs  ED Triage Vitals [03/06/20 0722]   Temperature Pulse Respirations Blood Pressure SpO2   97 6 °F (36 4 °C) 94 18 136/74 99 %      Temp Source Heart Rate Source Patient Position - Orthostatic VS BP Location FiO2 (%)   Oral Monitor -- -- --      Pain Score       --           Vitals:    03/06/20 0722   BP: 136/74   Pulse: 94         Visual Acuity      ED Medications  Medications   azithromycin (ZITHROMAX) tablet 500 mg (has no administration in time range)       Diagnostic Studies  Results Reviewed     None                 No orders to display              Procedures  Procedures         ED Course                               MDM  Number of Diagnoses or Management Options  Diagnosis management comments: DDx including but not limited to:  URI, bronchitis, pneumonia, GERD, aspiration pneumonitis, viral illness, sinusitis; doubt meningitis, smoke inhalation, CO poisoning, adverse medication reaction  DDx including but not limited to: doubt [facility] Nile virus, TB, malaria, ebola, dengue fever, chagas disease, Zika virus, Lassa fever, influenza, MERS, COVID-19 (novel coronavirus), SARS, HIV  Amount and/or Complexity of Data Reviewed  Decide to obtain previous medical records or to obtain history from someone other than the patient: yes  Review and summarize past medical records: yes          Disposition  Final diagnoses:   Bronchitis     Time reflects when diagnosis was documented in both MDM as applicable and the Disposition within this note     Time User Action Codes Description Comment    3/6/2020  7:33 AM Yo Faith Add [J40] Bronchitis       ED Disposition     ED Disposition Condition Date/Time Comment    Discharge Stable Fri Mar 6, 2020  7:35 AM Keeley Rincon discharge to home/self care  Follow-up Information     Follow up With Specialties Details Why Contact Info    Adriana Joyce PA-C Internal Medicine, Physician Assistant Call in 1 day Return sooner if increased cough, fever, vomiting, rash, difficulty breathing, diarrhea, lethargy, weakness  [address] [facility] [address]  [phone]            Patient's Medications   Discharge Prescriptions    AZITHROMYCIN (ZITHROMAX) 250 MG TABLET    Take 1 tablet (250 mg total) by mouth daily for 4 days       Start Date: 3/7/2020  End Date: 3/11/2020       Order Dose: 250 mg       Quantity: 4 tablet    Refills: 0     No discharge procedures on file      PDMP Review       Value Time User    PDMP Reviewed  Yes 3/6/2020  7:25 AM Martha Callaway MD          ED Provider  Electronically Signed by           Martha Callaway MD  03/06/20 9101

## 2020-05-08 ENCOUNTER — APPOINTMENT (EMERGENCY)
Dept: RADIOLOGY | Facility: HOSPITAL | Age: 30
End: 2020-05-08
Payer: COMMERCIAL

## 2020-05-08 ENCOUNTER — HOSPITAL ENCOUNTER (EMERGENCY)
Facility: HOSPITAL | Age: 30
Discharge: HOME | End: 2020-05-08
Attending: EMERGENCY MEDICINE | Admitting: EMERGENCY MEDICINE
Payer: COMMERCIAL

## 2020-05-08 VITALS
OXYGEN SATURATION: 99 % | WEIGHT: 195.99 LBS | BODY MASS INDEX: 33.46 KG/M2 | HEART RATE: 78 BPM | SYSTOLIC BLOOD PRESSURE: 119 MMHG | HEIGHT: 64 IN | DIASTOLIC BLOOD PRESSURE: 77 MMHG | TEMPERATURE: 98.3 F | RESPIRATION RATE: 18 BRPM

## 2020-05-08 DIAGNOSIS — R42 DIZZINESS: ICD-10-CM

## 2020-05-08 DIAGNOSIS — F41.9 ANXIETY: Primary | ICD-10-CM

## 2020-05-08 DIAGNOSIS — R00.2 PALPITATIONS: ICD-10-CM

## 2020-05-08 LAB
ANION GAP SERPL CALCULATED.3IONS-SCNC: 7 MMOL/L (ref 4–13)
ATRIAL RATE: 80 BPM
BASOPHILS # BLD AUTO: 0.07 THOUSANDS/ΜL (ref 0–0.1)
BASOPHILS NFR BLD AUTO: 1 % (ref 0–1)
BUN SERPL-MCNC: 14 MG/DL (ref 5–25)
CALCIUM SERPL-MCNC: 8.5 MG/DL (ref 8.3–10.1)
CHLORIDE SERPL-SCNC: 106 MMOL/L (ref 100–108)
CO2 SERPL-SCNC: 26 MMOL/L (ref 21–32)
CREAT SERPL-MCNC: 0.64 MG/DL (ref 0.6–1.3)
D DIMER PPP FEU-MCNC: 0.36 UG/ML FEU
EOSINOPHIL # BLD AUTO: 0.16 THOUSAND/ΜL (ref 0–0.61)
EOSINOPHIL NFR BLD AUTO: 3 % (ref 0–6)
ERYTHROCYTE [DISTWIDTH] IN BLOOD BY AUTOMATED COUNT: 13.1 % (ref 11.6–15.1)
GFR SERPL CREATININE-BSD FRML MDRD: 119 ML/MIN/1.73SQ M
GLUCOSE SERPL-MCNC: 90 MG/DL (ref 65–140)
GLUCOSE SERPL-MCNC: 97 MG/DL (ref 65–140)
HCT VFR BLD AUTO: 36.2 % (ref 34.8–46.1)
HGB BLD-MCNC: 12.1 G/DL (ref 11.5–15.4)
IMM GRANULOCYTES # BLD AUTO: 0.03 THOUSAND/UL (ref 0–0.2)
IMM GRANULOCYTES NFR BLD AUTO: 1 % (ref 0–2)
LYMPHOCYTES # BLD AUTO: 1.47 THOUSANDS/ΜL (ref 0.6–4.47)
LYMPHOCYTES NFR BLD AUTO: 24 % (ref 14–44)
MAGNESIUM SERPL-MCNC: 1.9 MG/DL (ref 1.6–2.6)
MCH RBC QN AUTO: 30.8 PG (ref 26.8–34.3)
MCHC RBC AUTO-ENTMCNC: 33.4 G/DL (ref 31.4–37.4)
MCV RBC AUTO: 92 FL (ref 82–98)
MONOCYTES # BLD AUTO: 0.43 THOUSAND/ΜL (ref 0.17–1.22)
MONOCYTES NFR BLD AUTO: 7 % (ref 4–12)
NEUTROPHILS # BLD AUTO: 4 THOUSANDS/ΜL (ref 1.85–7.62)
NEUTS SEG NFR BLD AUTO: 64 % (ref 43–75)
NRBC BLD AUTO-RTO: 0 /100 WBCS
P AXIS: 51 DEGREES
PHOSPHATE SERPL-MCNC: 3.2 MG/DL (ref 2.7–4.5)
PLATELET # BLD AUTO: 309 THOUSANDS/UL (ref 149–390)
PMV BLD AUTO: 9.5 FL (ref 8.9–12.7)
POTASSIUM SERPL-SCNC: 3.7 MMOL/L (ref 3.5–5.3)
PR INTERVAL: 134 MS
QRS AXIS: 69 DEGREES
QRSD INTERVAL: 96 MS
QT INTERVAL: 380 MS
QTC INTERVAL: 438 MS
RBC # BLD AUTO: 3.93 MILLION/UL (ref 3.81–5.12)
SODIUM SERPL-SCNC: 139 MMOL/L (ref 136–145)
T WAVE AXIS: 38 DEGREES
TSH SERPL DL<=0.05 MIU/L-ACNC: 0.48 UIU/ML (ref 0.36–3.74)
VENTRICULAR RATE: 80 BPM
WBC # BLD AUTO: 6.16 THOUSAND/UL (ref 4.31–10.16)

## 2020-05-08 PROCEDURE — 83735 ASSAY OF MAGNESIUM: CPT | Performed by: EMERGENCY MEDICINE

## 2020-05-08 PROCEDURE — 82948 REAGENT STRIP/BLOOD GLUCOSE: CPT

## 2020-05-08 PROCEDURE — 99284 EMERGENCY DEPT VISIT MOD MDM: CPT

## 2020-05-08 PROCEDURE — 84100 ASSAY OF PHOSPHORUS: CPT | Performed by: EMERGENCY MEDICINE

## 2020-05-08 PROCEDURE — 93005 ELECTROCARDIOGRAM TRACING: CPT

## 2020-05-08 PROCEDURE — 85379 FIBRIN DEGRADATION QUANT: CPT | Performed by: EMERGENCY MEDICINE

## 2020-05-08 PROCEDURE — 36415 COLL VENOUS BLD VENIPUNCTURE: CPT | Performed by: EMERGENCY MEDICINE

## 2020-05-08 PROCEDURE — 99283 EMERGENCY DEPT VISIT LOW MDM: CPT | Performed by: EMERGENCY MEDICINE

## 2020-05-08 PROCEDURE — 71045 X-RAY EXAM CHEST 1 VIEW: CPT

## 2020-05-08 PROCEDURE — 84443 ASSAY THYROID STIM HORMONE: CPT | Performed by: EMERGENCY MEDICINE

## 2020-05-08 PROCEDURE — 93010 ELECTROCARDIOGRAM REPORT: CPT | Performed by: INTERNAL MEDICINE

## 2020-05-08 PROCEDURE — 80048 BASIC METABOLIC PNL TOTAL CA: CPT | Performed by: EMERGENCY MEDICINE

## 2020-05-08 PROCEDURE — 85025 COMPLETE CBC W/AUTO DIFF WBC: CPT | Performed by: EMERGENCY MEDICINE

## 2020-05-08 RX ORDER — ACETAMINOPHEN 325 MG/1
975 TABLET ORAL ONCE
Status: COMPLETED | OUTPATIENT
Start: 2020-05-08 | End: 2020-05-08

## 2020-05-08 RX ORDER — METOCLOPRAMIDE 10 MG/1
10 TABLET ORAL ONCE
Status: COMPLETED | OUTPATIENT
Start: 2020-05-08 | End: 2020-05-08

## 2020-05-08 RX ORDER — DIPHENHYDRAMINE HCL 25 MG
50 TABLET ORAL ONCE
Status: COMPLETED | OUTPATIENT
Start: 2020-05-08 | End: 2020-05-08

## 2020-05-08 RX ADMIN — ACETAMINOPHEN 975 MG: 325 TABLET, FILM COATED ORAL at 10:53

## 2020-05-08 RX ADMIN — METOCLOPRAMIDE HYDROCHLORIDE 10 MG: 10 TABLET ORAL at 10:53

## 2020-05-08 RX ADMIN — DIPHENHYDRAMINE HCL 50 MG: 25 TABLET, COATED ORAL at 10:53

## 2020-06-29 ENCOUNTER — HOSPITAL ENCOUNTER (EMERGENCY)
Facility: HOSPITAL | Age: 30
Discharge: HOME | End: 2020-06-29
Attending: EMERGENCY MEDICINE
Payer: COMMERCIAL

## 2020-06-29 ENCOUNTER — APPOINTMENT (EMERGENCY)
Dept: RADIOLOGY | Facility: HOSPITAL | Age: 30
End: 2020-06-29
Payer: COMMERCIAL

## 2020-06-29 ENCOUNTER — APPOINTMENT (EMERGENCY)
Dept: CT IMAGING | Facility: HOSPITAL | Age: 30
End: 2020-06-29
Payer: COMMERCIAL

## 2020-06-29 VITALS
BODY MASS INDEX: 33.64 KG/M2 | HEART RATE: 84 BPM | RESPIRATION RATE: 16 BRPM | SYSTOLIC BLOOD PRESSURE: 141 MMHG | OXYGEN SATURATION: 100 % | TEMPERATURE: 98.2 F | DIASTOLIC BLOOD PRESSURE: 82 MMHG | WEIGHT: 195.99 LBS

## 2020-06-29 DIAGNOSIS — S00.83XA CONTUSION OF FACE, INITIAL ENCOUNTER: ICD-10-CM

## 2020-06-29 DIAGNOSIS — S93.491A SPRAIN OF ANTERIOR TALOFIBULAR LIGAMENT OF RIGHT ANKLE, INITIAL ENCOUNTER: ICD-10-CM

## 2020-06-29 DIAGNOSIS — S09.90XA INJURY OF HEAD, INITIAL ENCOUNTER: ICD-10-CM
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
## 2020-06-29 DIAGNOSIS — S30.1XXA CONTUSION OF ABDOMINAL WALL, INITIAL ENCOUNTER: ICD-10-CM

## 2020-06-29 DIAGNOSIS — S40.021A CONTUSION OF MULTIPLE SITES OF RIGHT UPPER ARM, INITIAL ENCOUNTER: ICD-10-CM

## 2020-06-29 LAB
BACTERIA UR QL AUTO: ABNORMAL /HPF
BILIRUB UR QL STRIP: NEGATIVE
CLARITY UR: CLEAR
COLOR UR: YELLOW
EXT PREG TEST URINE: NEGATIVE
EXT. CONTROL ED NAV: NORMAL
GLUCOSE UR STRIP-MCNC: NEGATIVE MG/DL
HGB UR QL STRIP.AUTO: NEGATIVE
HYALINE CASTS #/AREA URNS LPF: ABNORMAL /LPF
KETONES UR STRIP-MCNC: NEGATIVE MG/DL
LEUKOCYTE ESTERASE UR QL STRIP: ABNORMAL
MUCOUS THREADS UR QL AUTO: ABNORMAL
NITRITE UR QL STRIP: NEGATIVE
NON-SQ EPI CELLS URNS QL MICRO: ABNORMAL /HPF
PH UR STRIP.AUTO: 6 [PH] (ref 4.5–8)
PROT UR STRIP-MCNC: ABNORMAL MG/DL
RBC #/AREA URNS AUTO: ABNORMAL /HPF
SP GR UR STRIP.AUTO: 1.02 (ref 1–1.03)
UROBILINOGEN UR QL STRIP.AUTO: 0.2 E.U./DL
WBC #/AREA URNS AUTO: ABNORMAL /HPF

## 2020-06-29 PROCEDURE — 70486 CT MAXILLOFACIAL W/O DYE: CPT

## 2020-06-29 PROCEDURE — 71101 X-RAY EXAM UNILAT RIBS/CHEST: CPT

## 2020-06-29 PROCEDURE — 73060 X-RAY EXAM OF HUMERUS: CPT

## 2020-06-29 PROCEDURE — 81001 URINALYSIS AUTO W/SCOPE: CPT

## 2020-06-29 PROCEDURE — 96360 HYDRATION IV INFUSION INIT: CPT

## 2020-06-29 PROCEDURE — 74177 CT ABD & PELVIS W/CONTRAST: CPT

## 2020-06-29 PROCEDURE — 96361 HYDRATE IV INFUSION ADD-ON: CPT

## 2020-06-29 PROCEDURE — 73610 X-RAY EXAM OF ANKLE: CPT

## 2020-06-29 PROCEDURE — 99284 EMERGENCY DEPT VISIT MOD MDM: CPT

## 2020-06-29 PROCEDURE — 81025 URINE PREGNANCY TEST: CPT | Performed by: EMERGENCY MEDICINE

## 2020-06-29 PROCEDURE — 99284 EMERGENCY DEPT VISIT MOD MDM: CPT | Performed by: EMERGENCY MEDICINE

## 2020-06-29 RX ORDER — OXYCODONE HYDROCHLORIDE 5 MG/1
5 TABLET ORAL ONCE
Status: COMPLETED | OUTPATIENT
Start: 2020-06-29 | End: 2020-06-29

## 2020-06-29 RX ORDER — SODIUM CHLORIDE, SODIUM LACTATE, POTASSIUM CHLORIDE, CALCIUM CHLORIDE 600; 310; 30; 20 MG/100ML; MG/100ML; MG/100ML; MG/100ML
500 INJECTION, SOLUTION INTRAVENOUS CONTINUOUS
Status: DISCONTINUED | OUTPATIENT
Start: 2020-06-29 | End: 2020-06-29 | Stop reason: HOSPADM

## 2020-06-29 RX ADMIN — SODIUM CHLORIDE, SODIUM LACTATE, POTASSIUM CHLORIDE, AND CALCIUM CHLORIDE 500 ML: .6; .31; .03; .02 INJECTION, SOLUTION INTRAVENOUS at 10:40

## 2020-06-29 RX ADMIN — IOHEXOL 100 ML: 350 INJECTION, SOLUTION INTRAVENOUS at 10:19

## 2020-06-29 RX ADMIN — OXYCODONE HYDROCHLORIDE 5 MG: 5 TABLET ORAL at 11:03
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
## 2020-07-27 DIAGNOSIS — N92.0 MENORRHAGIA WITH REGULAR CYCLE: ICD-10-CM

## 2020-07-28 ENCOUNTER — ANNUAL EXAM (OUTPATIENT)
Dept: OBGYN CLINIC | Facility: CLINIC | Age: 30
End: 2020-07-28

## 2020-07-28 VITALS
BODY MASS INDEX: 30.77 KG/M2 | WEIGHT: 180.2 LBS | DIASTOLIC BLOOD PRESSURE: 72 MMHG | RESPIRATION RATE: 16 BRPM | HEART RATE: 84 BPM | TEMPERATURE: 98 F | HEIGHT: 64 IN | SYSTOLIC BLOOD PRESSURE: 130 MMHG

## 2020-07-28 DIAGNOSIS — N92.0 MENORRHAGIA WITH REGULAR CYCLE: ICD-10-CM

## 2020-07-28 DIAGNOSIS — Z01.419 ENCOUNTER FOR ANNUAL ROUTINE GYNECOLOGICAL EXAMINATION: Primary | ICD-10-CM

## 2020-07-28 DIAGNOSIS — Z77.21 EXPOSURE TO POTENTIALLY HAZARDOUS BODY FLUIDS: ICD-10-CM

## 2020-07-28 PROCEDURE — S0610 ANNUAL GYNECOLOGICAL EXAMINA: HCPCS | Performed by: FAMILY MEDICINE

## 2020-07-28 PROCEDURE — 3008F BODY MASS INDEX DOCD: CPT | Performed by: FAMILY MEDICINE

## 2020-07-28 RX ORDER — NORGESTIMATE AND ETHINYL ESTRADIOL 0.25-0.035
1 KIT ORAL DAILY
Qty: 28 TABLET | Refills: 12 | Status: SHIPPED | OUTPATIENT
Start: 2020-07-28 | End: 2021-07-29 | Stop reason: SDUPTHER

## 2020-07-28 NOTE — PROGRESS NOTES
Subjective      Mukesh Osorio is a 31 y o  female who presents for routine GYN exam  Periods are regular every 28-30 days, lasting 4 days  Dysmenorrhea:moderate, occurring every other month  Cyclic symptoms include anxiety, bloating, breast tenderness, changes in libido, constipation, depression, headache, insomnia, irritability, moodiness and pelvic pain  No intermenstrual bleeding, spotting, or discharge  Patient uses vaginal suppository after periods for odor  No itching but some white discharge  Will go away on its on after 3 days  Current contraception: OCP (estrogen/progesterone)  History of abnormal Pap smear: yes - 13 years ago   Family history of uterine or ovarian cancer: no  Regular self breast exam: no  History of abnormal mammogram: no  Family history of breast cancer: yes - AUNT  History of abnormal lipids: no  Menstrual History:  OB History        4    Para   4    Term   4            AB        Living   4       SAB        TAB        Ectopic        Multiple        Live Births   3                Menarche age: 5  Patient's last menstrual period was 2020  Period Cycle (Days): 28  Period Duration (Days): 4  Period Pattern: Regular  Menstrual Flow: Light  Dysmenorrhea: None    The following portions of the patient's history were reviewed and updated as appropriate: past surgical history  Review of Systems  Pertinent items are noted in HPI  Objective      /72 (BP Location: Left arm, Patient Position: Sitting, Cuff Size: Standard)   Pulse 84   Temp 98 °F (36 7 °C)   Resp 16   Ht 5' 4" (1 626 m)   Wt 81 7 kg (180 lb 3 2 oz)   LMP 2020   BMI 30 93 kg/m²     Physical Exam   Constitutional: She is oriented to person, place, and time  She appears well-developed and well-nourished  HENT:   Head: Normocephalic and atraumatic  Cardiovascular: Normal rate and regular rhythm     Pulmonary/Chest: Effort normal and breath sounds normal    Abdominal: Soft  Bowel sounds are normal    Genitourinary: No vaginal discharge found  Musculoskeletal: Normal range of motion  She exhibits no edema  Neurological: She is alert and oriented to person, place, and time  Skin: Skin is warm  No rash noted  Psychiatric: She has a normal mood and affect  Assessment     Normal gyn exam     1  Encounter for annual routine gynecological examination     2  Menorrhagia with regular cycle  norgestimate-ethinyl estradiol (Sprintec 28) 0 25-35 MG-MCG per tablet   3  Exposure to potentially hazardous body fluids  Rapid HIV 1/2 AB-AG Combo    Chlamydia/GC amplified DNA by PCR    Hepatitis panel, acute    RPR       Orders Placed This Encounter   Procedures    Chlamydia/GC amplified DNA by PCR    Rapid HIV 1/2 AB-AG Combo    Hepatitis panel, acute    RPR           Plan   Encouraged patient to discontinue vaginal suppository  Patient can try probiotic yogurt during period to combat post period odor  Blood tests: HIV, Hepatitis Panel, Chlamydia/GC, RPR  Contraception: OCP (estrogen/progesterone)  Follow up as needed

## 2021-07-29 DIAGNOSIS — N92.0 MENORRHAGIA WITH REGULAR CYCLE: ICD-10-CM

## 2021-07-29 RX ORDER — NORGESTIMATE AND ETHINYL ESTRADIOL 0.25-0.035
1 KIT ORAL DAILY
Qty: 28 TABLET | Refills: 0 | Status: SHIPPED | OUTPATIENT
Start: 2021-07-29 | End: 2021-08-04 | Stop reason: SDUPTHER

## 2021-08-04 ENCOUNTER — ANNUAL EXAM (OUTPATIENT)
Dept: OBGYN CLINIC | Facility: CLINIC | Age: 31
End: 2021-08-04

## 2021-08-04 VITALS
SYSTOLIC BLOOD PRESSURE: 114 MMHG | HEART RATE: 71 BPM | WEIGHT: 168 LBS | HEIGHT: 64 IN | DIASTOLIC BLOOD PRESSURE: 77 MMHG | BODY MASS INDEX: 28.68 KG/M2

## 2021-08-04 DIAGNOSIS — Z01.419 ENCOUNTER FOR ANNUAL ROUTINE GYNECOLOGICAL EXAMINATION: Primary | ICD-10-CM

## 2021-08-04 DIAGNOSIS — N92.0 MENORRHAGIA WITH REGULAR CYCLE: ICD-10-CM

## 2021-08-04 DIAGNOSIS — Z23 NEED FOR HPV VACCINATION: ICD-10-CM

## 2021-08-04 DIAGNOSIS — Z30.41 ENCOUNTER FOR SURVEILLANCE OF CONTRACEPTIVE PILLS: ICD-10-CM

## 2021-08-04 PROCEDURE — 90651 9VHPV VACCINE 2/3 DOSE IM: CPT

## 2021-08-04 PROCEDURE — 90471 IMMUNIZATION ADMIN: CPT

## 2021-08-04 PROCEDURE — 99395 PREV VISIT EST AGE 18-39: CPT | Performed by: NURSE PRACTITIONER

## 2021-08-04 RX ORDER — PHENTERMINE HYDROCHLORIDE 37.5 MG/1
37.5 CAPSULE ORAL EVERY MORNING
COMMUNITY
End: 2022-07-14

## 2021-08-04 RX ORDER — NORGESTIMATE AND ETHINYL ESTRADIOL 0.25-0.035
1 KIT ORAL DAILY
Qty: 28 TABLET | Refills: 12 | Status: SHIPPED | OUTPATIENT
Start: 2021-08-04 | End: 2022-07-22

## 2021-08-04 RX ORDER — TOPIRAMATE 25 MG/1
25 TABLET ORAL 2 TIMES DAILY
COMMUNITY
End: 2022-07-14

## 2021-08-04 NOTE — PATIENT INSTRUCTIONS
HPV (Human Papillomavirus)   WHAT YOU NEED TO KNOW:   What is human papillomavirus (HPV)? HPV is the most common infection spread by sexual contact  It can also be spread from a mother to her baby during delivery  HPV may cause oral and genital warts or tumors in your nose, mouth, throat, and lungs  HPV may also cause vaginal, penile, and anal cancers  You may not show symptoms of any of these conditions for several years after being exposed to HPV  What are the symptoms of HPV? · Painless warts    · Genital or anal discharge, bleeding, itching, or pain    · Pain when you urinate    How is HPV diagnosed? Your healthcare provider may use a vinegar liquid to help diagnose HPV genital warts  Women 27to 72years old can be checked for HPV during regular cervical cancer screenings  An HPV test checks for certain types of HPV that can cause changes in cervical cells  Without treatment, the changed cells can become cancer  An HPV test can be done every 5 years if the results show no infection  The test can be done with or without a Pap smear  A Pap smear checks for cancer or for abnormal cells that can become cancer  You may be tested for HPV if you are diagnosed with a mouth or throat cancer  How is HPV treated? HPV cannot be cured  Conditions that are caused by HPV can be treated  You will need to be monitored closely for these conditions  Ask your healthcare provider for more information about monitoring, conditions caused by HPV, and available treatments  How can HPV infection be prevented? · Ask about the HPV vaccine  The vaccine can help protect against HPV infection  Females and males can receive the vaccine  It is most effective if given before sexual activity begins  This allows the body to build almost complete protection against HPV before contact with the virus  The vaccine is usually given at 6or 15years of age but may be given as early as 5 years   The vaccine can be given through age 45     · Always use a condom during intercourse  A condom will not completely protect you from HPV infection, but it will help lower your risk  Use a new condom or latex barrier each time you have sex  This includes oral, vaginal, and anal sex  Make sure the condom fits and is put on correctly  Rubber latex sheets or dental dams can be used for oral sex  If you are allergic to latex, use a nonlatex product such as polyurethane  When should I call my doctor? · You have warts in your genital or anal area  · You have genital or anal discharge, bleeding, itching, or pain  · You have pain when you urinate  · You have questions or concerns about your condition or care  CARE AGREEMENT:   You have the right to help plan your care  Learn about your health condition and how it may be treated  Discuss treatment options with your healthcare providers to decide what care you want to receive  You always have the right to refuse treatment  The above information is an  only  It is not intended as medical advice for individual conditions or treatments  Talk to your doctor, nurse or pharmacist before following any medical regimen to see if it is safe and effective for you  © Copyright [employer] 2021 Information is for End User's use only and may not be sold, redistributed or otherwise used for commercial purposes   All illustrations and images included in CareNotes® are the copyrighted property of [employer]  or [address] [place] [address] [employer]

## 2021-08-04 NOTE — PROGRESS NOTES
ASSESSMENT & PLAN: Mars Mustafa is a 27 y o  [id number] with normal gynecologic exam     1   Routine well woman exam done today  2  Pap and HPV:  The patient's last pap and hpv was 7/25/2019  It was normal     Pap and cotesting was not done today  Current ASCCP Guidelines reviewed  Due in 07/25/2024  3  The following were reviewed in today's visit: breast self exam, STD testing, use and side effects of OCPs, adequate intake of calcium and vitamin D, exercise and healthy diet  4  Sprintec for menorrhagia with regular cycle, history of irregular menses after tubal  5  Gardisil vaccine in women up to age 39 discussed and information was provided  Patient desires and vaccine # 1  Was given today, patient return to office in 2 months for Gardasil vaccine # 2  BMI Counseling: Body mass index is 28 84 kg/m²  The BMI is above normal  Nutrition recommendations include 3-5 servings of fruits/vegetables daily, moderation in carbohydrate intake and increasing intake of lean protein  Exercise recommendations include exercising 3-5 times per week  CC:  Annual Gynecologic Examination    HPI: Mars Mustafa is a 27 y o  [id number] who presents for annual gynecologic examination  Her last Pap was 07/25/2019 and was negative  Last culture for chlamydia/ gonorrhea  was done 09/14/2019 and was negative  History of tubal   On OCPs for menorrhagia  Menses are monthly, last 4 days moderate flow  Patient desires is and OCPs  She denies any ACHES symptoms  She is with a new partner x1 year, declines STD testing     Health Maintenance:    She wears her seatbelt routinely  She does perform regular monthly self breast exams  She feels safe at home       Past Medical History:   Diagnosis Date    Anemia     Last assessed 4/10/2015    Heart murmur     Hemorrhoids     Migraine     Obesity     Last assessed 12/30/2013    Seasonal allergies        Past Surgical History:   Procedure Laterality Date  ABDOMINOPLASTY      CA HEMORRHOIDECTOMY,INT/EXT, 2+ COLUMNS/GROUPS N/A 3/10/2017    Procedure: HEMORRHOIDECTOMY ;  Surgeon: Silver Mcghee MD;  Location: [facility];  Service: Colorectal    TUBAL LIGATION         Past OB/Gyn History:  OB History        4    Para   4    Term   4            AB        Living   4       SAB        TAB        Ectopic        Multiple        Live Births   4               Pt does not have menstrual issues  History of sexually transmitted infection: No   History of abnormal pap smears: No      Patient is currently sexually active  heterosexual   The current method of family planning is tubal ligation      Family History   Problem Relation Age of Onset    Anemia Mother     Hypertension Mother     Diabetes Mother     Down syndrome Brother     Diabetes Maternal Grandmother     Alzheimer's disease Paternal Grandmother     No Known Problems Father     Thyroid disease Sister     No Known Problems Daughter     No Known Problems Son     No Known Problems Sister     No Known Problems Son     No Known Problems Daughter     Breast cancer Maternal Aunt        Social History:  Social History     Socioeconomic History    Marital status: Single     Spouse name: Not on file    Number of children: 3    Years of education: Not on file    Highest education level: Not on file   Occupational History     Employer: [email or web address]   Tobacco Use    Smoking status: Never Smoker    Smokeless tobacco: Never Used   Vaping Use    Vaping Use: Never used   Substance and Sexual Activity    Alcohol use: Yes     Comment: socially    Drug use: No    Sexual activity: Yes     Partners: Male     Birth control/protection: OCP   Other Topics Concern    Not on file   Social History Narrative    Exercises regularly     Social Determinants of Health     Financial Resource Strain: Low Risk     Difficulty of Paying Living Expenses: Not hard at all   Food Insecurity:     Worried About Running Out of Food in the Last Year:    [address] in the Last Year:    Transportation Needs: No Transportation Needs    Lack of Transportation (Medical): No    Lack of Transportation (Non-Medical): No   Physical Activity:     Days of Exercise per Week:     Minutes of Exercise per Session:    Stress: No Stress Concern Present    Feeling of Stress : Not at all   Social Connections:     Frequency of Communication with Friends and Family:     Frequency of Social Gatherings with Friends and Family:     Attends Gnosticism Services:     Active Member of Clubs or Organizations:     Attends Club or Organization Meetings:     Marital Status:    Intimate Partner Violence: Not At Risk    Fear of Current or Ex-Partner: No    Emotionally Abused: No    Physically Abused: No    Sexually Abused: No     Presently lives with family  Patient is single    Patient is currently employed     Allergies   Allergen Reactions    Pollen Extract          Current Outpatient Medications:     norgestimate-ethinyl estradiol (Sprintec 28) 0 25-35 MG-MCG per tablet, Take 1 tablet by mouth daily, Disp: 28 tablet, Rfl: 12    phentermine 37 5 MG capsule, Take 37 5 mg by mouth every morning, Disp: , Rfl:     topiramate (TOPAMAX) 25 mg tablet, Take 25 mg by mouth 2 (two) times a day, Disp: , Rfl:     cyclobenzaprine (FLEXERIL) 10 mg tablet, Take 1 tablet (10 mg total) by mouth 2 (two) times a day as needed for muscle spasms (Patient not taking: Reported on 2/25/2020), Disp: 20 tablet, Rfl: 0    ketorolac (TORADOL) 10 mg tablet, Take 1 tablet (10 mg total) by mouth every 6 (six) hours as needed for moderate pain (Patient not taking: Reported on 2/25/2020), Disp: 20 tablet, Rfl: 0    methylPREDNISolone 4 MG tablet therapy pack, Use as directed on package (Patient not taking: Reported on 2/25/2020), Disp: 21 tablet, Rfl: 0      Review of Systems  Constitutional :no fever, feels well, no tiredness, no recent weight gain or loss  ENT: no ear ache, no loss of hearing, no nosebleeds or nasal discharge, no sore throat or hoarseness  Cardiovascular: no complaints of slow or fast heart beat, no chest pain, no palpitations, no leg claudication or lower extremity edema  Respiratory: no complaints of shortness of shortness of breath, no HARLEY  Breasts:no complaints of breast pain, breast lump, or nipple discharge  Gastrointestinal: no complaints of abdominal pain, constipation, nausea, vomiting, or diarrhea or bloody stools  Genitourinary : no complaints of dysuria, incontinence, pelvic pain, no dysmenorrhea, vaginal discharge or abnormal vaginal bleeding and as noted in HPI  Musculoskeletal: no complaints of arthralgia, no myalgia, no joint swelling or stiffness, no limb pain or swelling  Integumentary: no complaints of skin rash or lesion, itching or dry skin  Neurological: no complaints of headache, no confusion, no numbness or tingling, no dizziness or fainting    Objective      /77 (BP Location: Left arm, Patient Position: Sitting, Cuff Size: Adult)   Pulse 71   Ht 5' 4" (1 626 m)   Wt 76 2 kg (168 lb)   LMP 07/29/2021   BMI 28 84 kg/m²   General:   appears stated age, cooperative, alert normal mood and affect   Neck: normal, supple,trachea midline, no masses   Heart: regular rate and rhythm, S1, S2 normal, no murmur, click, rub or gallop   Lungs: clear to auscultation bilaterally   Breasts: normal appearance, no masses or tenderness, Inspection negative, No nipple retraction or dimpling, No nipple discharge or bleeding, No axillary or supraclavicular adenopathy, Normal to palpation without dominant masses, Taught monthly breast self examination   Abdomen: soft, non-tender, without masses or organomegaly   Vulva: normal female genitalia   Vagina: normal vagina, no discharge, exudate, lesion, or erythema   Urethra: normal   Cervix: Normal, no discharge  Nontender     Uterus: normal size, contour, position, consistency, mobility, non-tender and anteverted   Adnexa: normal adnexa and no mass, fullness, tenderness   Lymphatic palpation of lymph nodes in neck, axilla, groin and/or other locations: no lymphadenopathy or masses noted   Skin normal skin turgor and no rashes     Psychiatric orientation to person, place, and time: normal  mood and affect: normal

## 2021-08-07 ENCOUNTER — IMMUNIZATIONS (OUTPATIENT)
Dept: FAMILY MEDICINE CLINIC | Facility: HOSPITAL | Age: 31
End: 2021-08-07

## 2021-08-07 DIAGNOSIS — Z23 ENCOUNTER FOR IMMUNIZATION: Primary | ICD-10-CM

## 2021-08-07 PROCEDURE — 0001A SARS-COV-2 / COVID-19 MRNA VACCINE (PFIZER-BIONTECH) 30 MCG: CPT

## 2021-08-07 PROCEDURE — 91300 SARS-COV-2 / COVID-19 MRNA VACCINE (PFIZER-BIONTECH) 30 MCG: CPT

## 2021-08-27 ENCOUNTER — OFFICE VISIT (OUTPATIENT)
Dept: URGENT CARE | Age: 31
End: 2021-08-27
Payer: COMMERCIAL

## 2021-08-27 ENCOUNTER — HOSPITAL ENCOUNTER (EMERGENCY)
Facility: HOSPITAL | Age: 31
Discharge: HOME | End: 2021-08-27
Attending: EMERGENCY MEDICINE
Payer: COMMERCIAL

## 2021-08-27 VITALS
SYSTOLIC BLOOD PRESSURE: 114 MMHG | HEART RATE: 80 BPM | RESPIRATION RATE: 14 BRPM | DIASTOLIC BLOOD PRESSURE: 77 MMHG | OXYGEN SATURATION: 99 % | TEMPERATURE: 97.4 F

## 2021-08-27 VITALS
DIASTOLIC BLOOD PRESSURE: 81 MMHG | SYSTOLIC BLOOD PRESSURE: 125 MMHG | OXYGEN SATURATION: 98 % | HEART RATE: 72 BPM | RESPIRATION RATE: 16 BRPM

## 2021-08-27 DIAGNOSIS — R42 LIGHTHEADED: ICD-10-CM

## 2021-08-27 DIAGNOSIS — N93.9 VAGINA BLEEDING: ICD-10-CM

## 2021-08-27 DIAGNOSIS — M54.50 BILATERAL LOW BACK PAIN, UNSPECIFIED CHRONICITY, UNSPECIFIED WHETHER SCIATICA PRESENT: ICD-10-CM

## 2021-08-27 DIAGNOSIS — R10.30 LOWER ABDOMINAL PAIN: Primary | ICD-10-CM

## 2021-08-27 DIAGNOSIS — R42 DIZZINESS: ICD-10-CM

## 2021-08-27 DIAGNOSIS — M54.9 BACK PAIN: Primary | ICD-10-CM

## 2021-08-27 DIAGNOSIS — N39.0 UTI (URINARY TRACT INFECTION): ICD-10-CM

## 2021-08-27 LAB
ALBUMIN SERPL BCP-MCNC: 4 G/DL (ref 3.5–5)
ALP SERPL-CCNC: 65 U/L (ref 46–116)
ALT SERPL W P-5'-P-CCNC: 29 U/L (ref 12–78)
ANION GAP SERPL CALCULATED.3IONS-SCNC: 12 MMOL/L (ref 4–13)
AST SERPL W P-5'-P-CCNC: 15 U/L (ref 5–45)
BACTERIA UR QL AUTO: ABNORMAL /HPF
BASOPHILS # BLD AUTO: 0.03 THOUSANDS/ΜL (ref 0–0.1)
BASOPHILS NFR BLD AUTO: 1 % (ref 0–1)
BILIRUB SERPL-MCNC: 1.49 MG/DL (ref 0.2–1)
BILIRUB UR QL STRIP: NEGATIVE
BUN SERPL-MCNC: 15 MG/DL (ref 5–25)
CALCIUM SERPL-MCNC: 9.2 MG/DL (ref 8.3–10.1)
CHLORIDE SERPL-SCNC: 104 MMOL/L (ref 100–108)
CLARITY UR: CLEAR
CO2 SERPL-SCNC: 21 MMOL/L (ref 21–32)
COLOR UR: YELLOW
CREAT SERPL-MCNC: 0.94 MG/DL (ref 0.6–1.3)
EOSINOPHIL # BLD AUTO: 0.04 THOUSAND/ΜL (ref 0–0.61)
EOSINOPHIL NFR BLD AUTO: 1 % (ref 0–6)
ERYTHROCYTE [DISTWIDTH] IN BLOOD BY AUTOMATED COUNT: 12.1 % (ref 11.6–15.1)
EXT PREG TEST URINE: NEGATIVE
EXT. CONTROL ED NAV: NORMAL
GFR SERPL CREATININE-BSD FRML MDRD: 81 ML/MIN/1.73SQ M
GLUCOSE SERPL-MCNC: 102 MG/DL (ref 65–140)
GLUCOSE UR STRIP-MCNC: NEGATIVE MG/DL
HCT VFR BLD AUTO: 39.2 % (ref 34.8–46.1)
HGB BLD-MCNC: 13 G/DL (ref 11.5–15.4)
HGB UR QL STRIP.AUTO: ABNORMAL
HOLD SPECIMEN: NORMAL
IMM GRANULOCYTES # BLD AUTO: 0.02 THOUSAND/UL (ref 0–0.2)
IMM GRANULOCYTES NFR BLD AUTO: 0 % (ref 0–2)
KETONES UR STRIP-MCNC: NEGATIVE MG/DL
LEUKOCYTE ESTERASE UR QL STRIP: ABNORMAL
LIPASE SERPL-CCNC: 59 U/L (ref 73–393)
LYMPHOCYTES # BLD AUTO: 1.29 THOUSANDS/ΜL (ref 0.6–4.47)
LYMPHOCYTES NFR BLD AUTO: 20 % (ref 14–44)
MCH RBC QN AUTO: 30.2 PG (ref 26.8–34.3)
MCHC RBC AUTO-ENTMCNC: 33.2 G/DL (ref 31.4–37.4)
MCV RBC AUTO: 91 FL (ref 82–98)
MONOCYTES # BLD AUTO: 0.59 THOUSAND/ΜL (ref 0.17–1.22)
MONOCYTES NFR BLD AUTO: 9 % (ref 4–12)
NEUTROPHILS # BLD AUTO: 4.64 THOUSANDS/ΜL (ref 1.85–7.62)
NEUTS SEG NFR BLD AUTO: 69 % (ref 43–75)
NITRITE UR QL STRIP: POSITIVE
NON-SQ EPI CELLS URNS QL MICRO: ABNORMAL /HPF
NRBC BLD AUTO-RTO: 0 /100 WBCS
PH UR STRIP.AUTO: 5.5 [PH] (ref 4.5–8)
PLATELET # BLD AUTO: 218 THOUSANDS/UL (ref 149–390)
PMV BLD AUTO: 10.2 FL (ref 8.9–12.7)
POTASSIUM SERPL-SCNC: 3.8 MMOL/L (ref 3.5–5.3)
PROT SERPL-MCNC: 8.8 G/DL (ref 6.4–8.2)
PROT UR STRIP-MCNC: NEGATIVE MG/DL
RBC # BLD AUTO: 4.3 MILLION/UL (ref 3.81–5.12)
RBC #/AREA URNS AUTO: ABNORMAL /HPF
SODIUM SERPL-SCNC: 137 MMOL/L (ref 136–145)
SP GR UR STRIP.AUTO: 1.02 (ref 1–1.03)
UROBILINOGEN UR QL STRIP.AUTO: 0.2 E.U./DL
WBC # BLD AUTO: 6.61 THOUSAND/UL (ref 4.31–10.16)
WBC #/AREA URNS AUTO: ABNORMAL /HPF

## 2021-08-27 PROCEDURE — 87086 URINE CULTURE/COLONY COUNT: CPT

## 2021-08-27 PROCEDURE — 87186 SC STD MICRODIL/AGAR DIL: CPT

## 2021-08-27 PROCEDURE — 99283 EMERGENCY DEPT VISIT LOW MDM: CPT

## 2021-08-27 PROCEDURE — 99213 OFFICE O/P EST LOW 20 MIN: CPT | Performed by: PHYSICIAN ASSISTANT

## 2021-08-27 PROCEDURE — 81025 URINE PREGNANCY TEST: CPT | Performed by: EMERGENCY MEDICINE

## 2021-08-27 PROCEDURE — 87077 CULTURE AEROBIC IDENTIFY: CPT

## 2021-08-27 PROCEDURE — 83690 ASSAY OF LIPASE: CPT | Performed by: EMERGENCY MEDICINE

## 2021-08-27 PROCEDURE — 81001 URINALYSIS AUTO W/SCOPE: CPT

## 2021-08-27 PROCEDURE — 85025 COMPLETE CBC W/AUTO DIFF WBC: CPT | Performed by: EMERGENCY MEDICINE

## 2021-08-27 PROCEDURE — 36415 COLL VENOUS BLD VENIPUNCTURE: CPT

## 2021-08-27 PROCEDURE — 80053 COMPREHEN METABOLIC PANEL: CPT | Performed by: EMERGENCY MEDICINE

## 2021-08-27 PROCEDURE — 99284 EMERGENCY DEPT VISIT MOD MDM: CPT | Performed by: EMERGENCY MEDICINE

## 2021-08-27 RX ORDER — NAPROXEN 250 MG/1
250 TABLET ORAL ONCE
Status: COMPLETED | OUTPATIENT
Start: 2021-08-27 | End: 2021-08-27

## 2021-08-27 RX ORDER — MECLIZINE HCL 12.5 MG/1
25 TABLET ORAL ONCE
Status: COMPLETED | OUTPATIENT
Start: 2021-08-27 | End: 2021-08-27

## 2021-08-27 RX ORDER — CEFPODOXIME PROXETIL 200 MG/1
200 TABLET, FILM COATED ORAL 2 TIMES DAILY
Qty: 20 TABLET | Refills: 0 | Status: SHIPPED | OUTPATIENT
Start: 2021-08-27 | End: 2021-09-06

## 2021-08-27 RX ORDER — NAPROXEN 375 MG/1
375 TABLET ORAL 2 TIMES DAILY WITH MEALS
Qty: 14 TABLET | Refills: 0 | Status: SHIPPED | OUTPATIENT
Start: 2021-08-27 | End: 2022-07-14

## 2021-08-27 RX ORDER — CEFPODOXIME PROXETIL 200 MG/1
400 TABLET, FILM COATED ORAL ONCE
Status: COMPLETED | OUTPATIENT
Start: 2021-08-27 | End: 2021-08-27

## 2021-08-27 RX ORDER — MECLIZINE HYDROCHLORIDE 25 MG/1
25 TABLET ORAL 3 TIMES DAILY PRN
Qty: 15 TABLET | Refills: 0 | Status: SHIPPED | OUTPATIENT
Start: 2021-08-27 | End: 2022-07-19

## 2021-08-27 RX ADMIN — CEFPODOXIME PROXETIL 400 MG: 200 TABLET, FILM COATED ORAL at 15:18

## 2021-08-27 RX ADMIN — MECLIZINE 25 MG: 12.5 TABLET ORAL at 14:57

## 2021-08-27 RX ADMIN — NAPROXEN 250 MG: 250 TABLET ORAL at 14:56

## 2021-08-27 NOTE — Clinical Note
Gonzalo Moses was seen and treated in our emergency department on 8/27/2021  Diagnosis:     Gunnar Quinteros  may return to work on return date  She may return on this date: 08/30/2021         If you have any questions or concerns, please don't hesitate to call        Vicente Riley MD    ______________________________           _______________          _______________  Hospital Representative                              Date                                Time

## 2021-08-27 NOTE — ED PROVIDER NOTES
History  Chief Complaint   Patient presents with    Back Pain     Back pain, dizzy, headache, started yesterday  Started with menses and clots yesterday  History provided by:  Patient   used: No        Patient's main concern is lower back pain  Seen at Urgent Care, since emergency department for further evaluation  Started with menses yesterday  Also describes dizziness as spinning sensation  No falls or trauma  No syncope  No chest pain  No meds taken at home  No fever, chills  Prior to Admission Medications   Prescriptions Last Dose Informant Patient Reported? Taking?    cyclobenzaprine (FLEXERIL) 10 mg tablet   No No   Sig: Take 1 tablet (10 mg total) by mouth 2 (two) times a day as needed for muscle spasms   Patient not taking: Reported on 2/25/2020   ketorolac (TORADOL) 10 mg tablet   No No   Sig: Take 1 tablet (10 mg total) by mouth every 6 (six) hours as needed for moderate pain   Patient not taking: Reported on 2/25/2020   methylPREDNISolone 4 MG tablet therapy pack   No No   Sig: Use as directed on package   Patient not taking: Reported on 2/25/2020   norgestimate-ethinyl estradiol (Sprintec 28) 0 25-35 MG-MCG per tablet   No No   Sig: Take 1 tablet by mouth daily   phentermine 37 5 MG capsule  Self Yes No   Sig: Take 37 5 mg by mouth every morning   topiramate (TOPAMAX) 25 mg tablet  Self Yes No   Sig: Take 25 mg by mouth 2 (two) times a day      Facility-Administered Medications: None       Past Medical History:   Diagnosis Date    Anemia     Last assessed 4/10/2015    Heart murmur     Hemorrhoids     Migraine     Obesity     Last assessed 12/30/2013    Seasonal allergies        Past Surgical History:   Procedure Laterality Date    ABDOMINOPLASTY      [place] HEMORRHOIDECTOMY,INT/EXT, 2+ COLUMNS/GROUPS N/A 3/10/2017    Procedure: HEMORRHOIDECTOMY ;  Surgeon: Javier Horne MD;  [facility];  Service: Colorectal    TUBAL LIGATION         Family History Problem Relation Age of Onset    Anemia Mother     Hypertension Mother     Diabetes Mother     Down syndrome Brother     Diabetes Maternal Grandmother     Alzheimer's disease Paternal Grandmother     No Known Problems Father     Thyroid disease Sister     No Known Problems Daughter     No Known Problems Son     No Known Problems Sister     No Known Problems Son     No Known Problems Daughter     Breast cancer Maternal Aunt      I have reviewed and agree with the history as documented  E-Cigarette/Vaping    E-Cigarette Use Never User      E-Cigarette/Vaping Substances    Nicotine No     THC No     CBD No     Flavoring No     Other No     Unknown No      Social History     Tobacco Use    Smoking status: Never Smoker    Smokeless tobacco: Never Used   Vaping Use    Vaping Use: Never used   Substance Use Topics    Alcohol use: Yes     Comment: socially    Drug use: Yes     Types: Marijuana       Review of Systems   Musculoskeletal: Positive for back pain  Neurological: Positive for dizziness  All other systems reviewed and are negative  Physical Exam  Physical Exam  Vitals and nursing note reviewed  Constitutional:       General: She is not in acute distress  Appearance: She is well-developed  HENT:      Head: Normocephalic and atraumatic  Eyes:      Pupils: Pupils are equal, round, and reactive to light  Cardiovascular:      Rate and Rhythm: Normal rate and regular rhythm  Heart sounds: Normal heart sounds  No murmur heard  Pulmonary:      Effort: Pulmonary effort is normal  No respiratory distress  Breath sounds: Normal breath sounds  No wheezing or rales  Abdominal:      General: Bowel sounds are normal  There is no distension  Palpations: Abdomen is soft  Tenderness: There is no abdominal tenderness  There is no guarding or rebound  Comments: Soft, nontender, nondistended  Musculoskeletal:         General: No deformity   Normal range of motion  Cervical back: Normal range of motion and neck supple  Comments: Left CVA tenderness   Lymphadenopathy:      Cervical: No cervical adenopathy  Skin:     Capillary Refill: Capillary refill takes less than 2 seconds  Findings: No erythema or rash  Neurological:      Mental Status: She is alert and oriented to person, place, and time  Cranial Nerves: No cranial nerve deficit  Motor: No abnormal muscle tone  Coordination: Coordination normal       Comments: Normal cranial nerve exam   Normal strength and sensation bilateral upper lower extremities  Normal coordination normal gait  Psychiatric:         Behavior: Behavior normal          Vital Signs  ED Triage Vitals   Temp Pulse Respirations Blood Pressure SpO2   -- 08/27/21 1202 08/27/21 1202 08/27/21 1202 08/27/21 1449    88 16 142/81 98 %      Temp src Heart Rate Source Patient Position - Orthostatic VS BP Location FiO2 (%)   -- 08/27/21 1202 08/27/21 1202 08/27/21 1202 --    Monitor Sitting Right arm       Pain Score       --                  Vitals:    08/27/21 1202 08/27/21 1449   BP: 142/81 125/81   Pulse: 88 72   Patient Position - Orthostatic VS: Sitting Lying         Visual Acuity      ED Medications  Medications   cefpodoxime (VANTIN) tablet 400 mg (has no administration in time range)   naproxen (NAPROSYN) tablet 250 mg (250 mg Oral Given 8/27/21 1456)   meclizine (ANTIVERT) tablet 25 mg (25 mg Oral Given 8/27/21 1457)       Diagnostic Studies  Results Reviewed     Procedure Component Value Units Date/Time    Urine Microscopic [[id number]] Collected: 08/27/21 1441    Lab Status:  In process Specimen: Urine, Clean Catch Updated: 08/27/21 1450    POCT pregnancy, urine [[id number]]  (Normal) Resulted: 08/27/21 1443    Lab Status: Final result Specimen: Urine Updated: 08/27/21 1443     EXT PREG TEST UR (Ref: Negative) negative     Control valid    Urine Macroscopic, POC [[id number]]  (Abnormal) Collected: 08/27/21 1441 Lab Status: Final result Specimen: Urine Updated: 08/27/21 1442     Color, UA Yellow     Clarity, UA Clear     pH, UA 5 5     Leukocytes, UA Trace     Nitrite, UA Positive     Protein, UA Negative mg/dl      Glucose, UA Negative mg/dl      Ketones, UA Negative mg/dl      Urobilinogen, UA 0 2 E U /dl      Bilirubin, UA Negative     Blood, UA Small     Specific [place], UA 1 020    Narrative:      CLINITEK RESULT    [place] draw [[id number]] Collected: 08/27/21 1204    Lab Status: In process Specimen: Blood Updated: 08/27/21 1401    Narrative: The following orders were created for panel order [place] draw  Procedure                               Abnormality         Status                     ---------                               -----------         ------                     Santo Eli Top on JLLP[[id number]]                           Final result               Green / Yellow tube on PLZW[[id number]]                                                 Green / Black tube on WFAA[[id number]]                       Final result                 Please view results for these tests on the individual orders      Comprehensive metabolic panel [[id number]]  (Abnormal) Collected: 08/27/21 1204    Lab Status: Final result Specimen: Blood from Arm, Right Updated: 08/27/21 1231     Sodium 137 mmol/L      Potassium 3 8 mmol/L      Chloride 104 mmol/L      CO2 21 mmol/L      ANION GAP 12 mmol/L      BUN 15 mg/dL      Creatinine 0 94 mg/dL      Glucose 102 mg/dL      Calcium 9 2 mg/dL      AST 15 U/L      ALT 29 U/L      Alkaline Phosphatase 65 U/L      Total Protein 8 8 g/dL      Albumin 4 0 g/dL      Total Bilirubin 1 49 mg/dL      eGFR 81 ml/min/1 73sq m     Narrative:      [place] guidelines for Chronic Kidney Disease (CKD):     Stage 1 with normal or high GFR (GFR > 90 mL/min/1 73 square meters)    Stage 2 Mild CKD (GFR = 60-89 mL/min/1 73 square meters)    Stage 3A Moderate CKD (GFR = 45-59 mL/min/1 73 square meters)    Stage 3B Moderate CKD (GFR = 30-44 mL/min/1 73 square meters)    Stage 4 Severe CKD (GFR = 15-29 mL/min/1 73 square meters)    Stage 5 End Stage CKD (GFR <15 mL/min/1 73 square meters)  Note: GFR calculation is accurate only with a steady state creatinine    Lipase [[id number]]  (Abnormal) Collected: 08/27/21 1204    Lab Status: Final result Specimen: Blood from Arm, Right Updated: 08/27/21 1231     Lipase 59 u/L     CBC and differential [[id number]] Collected: 08/27/21 1204    Lab Status: Final result Specimen: Blood from Arm, Right Updated: 08/27/21 1219     WBC 6 61 Thousand/uL      RBC 4 30 Million/uL      Hemoglobin 13 0 g/dL      Hematocrit 39 2 %      MCV 91 fL      MCH 30 2 pg      MCHC 33 2 g/dL      RDW 12 1 %      MPV 10 2 fL      Platelets 437 Thousands/uL      nRBC 0 /100 WBCs      Neutrophils Relative 69 %      Immat GRANS % 0 %      Lymphocytes Relative 20 %      Monocytes Relative 9 %      Eosinophils Relative 1 %      Basophils Relative 1 %      Neutrophils Absolute 4 64 Thousands/µL      Immature Grans Absolute 0 02 Thousand/uL      Lymphocytes Absolute 1 29 Thousands/µL      Monocytes Absolute 0 59 Thousand/µL      Eosinophils Absolute 0 04 Thousand/µL      Basophils Absolute 0 03 Thousands/µL                  No orders to display              Procedures  Procedures         ED Course                                           MDM  Number of Diagnoses or Management Options  Back pain: new and requires workup  Dizziness: new and requires workup  UTI (urinary tract infection): new and requires workup  Diagnosis management comments: Patient sent from urgent care  Left CVA tenderness  No fever, patient overall appears well  No nausea or vomiting  Pregnancy test negative  Leukocytes, nitrites on urinalysis  Will treat as pyelonephritis  Vertiginous symptoms, nonfocal neurologic exam, doubt posterior circulation stroke  Will treat with meclizine  Naproxen for pain  Additional laboratory studies show no significant abnormalities  Abdomen soft, nontender, nondistended  No advanced imaging indicated  Amount and/or Complexity of Data Reviewed  Clinical lab tests: ordered and reviewed  Tests in the radiology section of CPT®: ordered and reviewed  Tests in the medicine section of CPT®: ordered and reviewed    Risk of Complications, Morbidity, and/or Mortality  Presenting problems: high  Diagnostic procedures: moderate  Management options: high    Patient Progress  Patient progress: improved      Disposition  Final diagnoses:   Back pain   Dizziness   UTI (urinary tract infection)     Time reflects when diagnosis was documented in both MDM as applicable and the Disposition within this note     Time User Action Codes Description Comment    8/27/2021  2:50 PM Erroll Sides Add [M54 9] Back pain     8/27/2021  2:50 PM Erroll Sides Add [R42] Dizziness     8/27/2021  2:50 PM Erroll Sides Add [N39 0] UTI (urinary tract infection)       ED Disposition     ED Disposition Condition Date/Time Comment    Discharge Stable Fri Aug 27, 2021  2:50 PM Edgar Bills discharge to home/self care  Follow-up Information     Follow up With Specialties Details Why Contact Info Additional Information    Veronique Etienne PA-C Internal Medicine, Physician Assistant Schedule an appointment as soon as possible for a visit in 2 days As needed [phone] [facility] Emergency Medicine Go to  If symptoms worsen [address] Emergency Department, [address], [place], [place], [address]          Patient's Medications   Discharge Prescriptions    CEFPODOXIME (VANTIN) 200 MG TABLET    Take 1 tablet (200 mg total) by mouth 2 (two) times a day for 10 days       Start Date: 8/27/2021 End Date: 9/6/2021       Order Dose: 200 mg       Quantity: 20 tablet    Refills: 0    MECLIZINE (ANTIVERT) 25 MG TABLET    Take 1 tablet (25 mg total) by mouth 3 (three) times a day as needed for dizziness for up to 5 days       Start Date: 8/27/2021 End Date: 9/1/2021       Order Dose: 25 mg       Quantity: 15 tablet    Refills: 0    NAPROXEN (NAPROSYN) 375 MG TABLET    Take 1 tablet (375 mg total) by mouth 2 (two) times a day with meals for 7 days       Start Date: 8/27/2021 End Date: 9/3/2021       Order Dose: 375 mg       Quantity: 14 tablet    Refills: 0     No discharge procedures on file      PDMP Review       Value Time User    PDMP Reviewed  Yes 3/6/2020  7:25 AM Anitra So MD          ED Provider  Electronically Signed by           Chai Bradshaw MD  08/27/21 8264

## 2021-08-27 NOTE — PROGRESS NOTES
[address] [facility]        NAME: Yinka Perez is a 27 y o  female  : 1990    MRN: [id number]  DATE: 2021  TIME: 10:33 AM    Assessment and Plan   Lower abdominal pain [R10 30]  1  Lower abdominal pain  Transfer to other facility   2  Vagina bleeding  Transfer to other facility   3  Bilateral low back pain, unspecified chronicity, unspecified whether sciatica present  Transfer to other facility   4  Lightheaded  Transfer to other facility     Intermittent Dizziness/lightheaded, heavy vaginal bleeding, Severe back pain and lower abdominal pain    Patient Instructions      Patient declined ambulance transfer  She would like to go via private vehicle to [facility] Emergency Department- she denies any current dizziness or lightheadedness  Please go to the [facility] Emergency Department now for further evaluation and treatment- hospital address verified with the patient  Patient agreed to go immediately to the ED  Chief Complaint     Chief Complaint   Patient presents with    Headache     symptoms since yesterday  reports she had to leave work today due to symptoms   Back Pain     denies burning with urination, urinary freqency or ugency    Dizziness     "room spinning"     Vaginal Bleeding     reports period came two days early this month (tuesday instead of thursday) with a very heavy flow         History of Present Illness         71-year-old female presents with severe bilateral lower back pain, severe bilateral lower abdominal pain, pelvic pain since yesterday  Patient states she is having heavy vaginal bleeding with some clots, states she started her menstrual cycle 2 days early  States she normally never started her menstrual cycle early  States she is using multiple pads and tampons throughout the day   She states yesterday she was having intermittent episodes of dizziness where she felt lightheaded like she might pass out, she denies any syncopal episodes  She denies any current dizziness or lightheadedness but states that it is coming and going  She states also having a slight headache since yesterday  She denies any nausea, vomiting, diarrhea, chest pain, shortness of breath, fevers  Denies any other vaginal discharge  She denies any pregnancy risk-  States her tubes are tied  Review of Systems   Review of Systems   Constitutional: Negative for fatigue and fever  HENT: Negative  Respiratory: Negative for cough and shortness of breath  Cardiovascular: Negative for chest pain and leg swelling  Gastrointestinal: Positive for abdominal pain  Negative for diarrhea, nausea and vomiting  Genitourinary: Positive for pelvic pain and vaginal bleeding  Negative for difficulty urinating, dysuria, flank pain, frequency, hematuria and urgency  Musculoskeletal: Positive for back pain  Negative for joint swelling and neck pain  Skin: Negative for rash and wound  Neurological: Positive for dizziness, light-headedness and headaches  Negative for syncope, weakness and numbness  All other systems reviewed and are negative          Current Medications       Current Outpatient Medications:     cyclobenzaprine (FLEXERIL) 10 mg tablet, Take 1 tablet (10 mg total) by mouth 2 (two) times a day as needed for muscle spasms (Patient not taking: Reported on 2/25/2020), Disp: 20 tablet, Rfl: 0    ketorolac (TORADOL) 10 mg tablet, Take 1 tablet (10 mg total) by mouth every 6 (six) hours as needed for moderate pain (Patient not taking: Reported on 2/25/2020), Disp: 20 tablet, Rfl: 0    methylPREDNISolone 4 MG tablet therapy pack, Use as directed on package (Patient not taking: Reported on 2/25/2020), Disp: 21 tablet, Rfl: 0    norgestimate-ethinyl estradiol (Sprintec 28) 0 25-35 MG-MCG per tablet, Take 1 tablet by mouth daily, Disp: 28 tablet, Rfl: 12    phentermine 37 5 MG capsule, Take 37 5 mg by mouth every morning, Disp: , Rfl:    topiramate (TOPAMAX) 25 mg tablet, Take 25 mg by mouth 2 (two) times a day, Disp: , Rfl:     Current Allergies     Allergies as of 08/27/2021 - Reviewed 08/27/2021   Allergen Reaction Noted    Pollen extract  04/20/2018            The following portions of the patient's history were reviewed and updated as appropriate: allergies, current medications, past family history, past medical history, past social history, past surgical history and problem list      Past Medical History:   Diagnosis Date    Anemia     Last assessed 4/10/2015    Heart murmur     Hemorrhoids     Migraine     Obesity     Last assessed 12/30/2013    Seasonal allergies        Past Surgical History:   Procedure Laterality Date    ABDOMINOPLASTY      [place] HEMORRHOIDECTOMY,INT/EXT, 2+ COLUMNS/GROUPS N/A 3/10/2017    Procedure: HEMORRHOIDECTOMY ;  Surgeon: Joanna Andino MD;  [facility];  Service: Colorectal    TUBAL LIGATION         Family History   Problem Relation Age of Onset    Anemia Mother     Hypertension Mother     Diabetes Mother     Down syndrome Brother     Diabetes Maternal Grandmother     Alzheimer's disease Paternal Grandmother     No Known Problems Father     Thyroid disease Sister     No Known Problems Daughter     No Known Problems Son     No Known Problems Sister     No Known Problems Son     No Known Problems Daughter     Breast cancer Maternal Aunt          Medications have been verified  Objective   /77   Pulse 80   Temp (!) 97 4 °F (36 3 °C)   Resp 14   LMP 08/24/2021   SpO2 99%        Physical Exam     Physical Exam  Vitals and nursing note reviewed  Constitutional:       General: She is not in acute distress  Appearance: She is well-developed  HENT:      Head: Normocephalic and atraumatic  Eyes:      Extraocular Movements: Extraocular movements intact  Pupils: Pupils are equal, round, and reactive to light     Cardiovascular:      Rate and Rhythm: Normal rate and regular rhythm  Heart sounds: Normal heart sounds  Pulmonary:      Effort: Pulmonary effort is normal       Breath sounds: Normal breath sounds  No wheezing  Abdominal:      General: Bowel sounds are normal       Palpations: Abdomen is soft  Tenderness: There is abdominal tenderness in the right lower quadrant, suprapubic area and left lower quadrant  There is no guarding or rebound  Genitourinary:     Comments: Unable to do in this setting  Musculoskeletal:      Cervical back: Normal, normal range of motion and neck supple  No bony tenderness  Thoracic back: Normal  No tenderness or bony tenderness  Lumbar back: Normal  No spasms, tenderness or bony tenderness  Skin:     Capillary Refill: Capillary refill takes less than 2 seconds  Neurological:      General: No focal deficit present  Mental Status: She is alert and oriented to person, place, and time  Deep Tendon Reflexes: Reflexes are normal and symmetric     Psychiatric:         Behavior: Behavior normal

## 2021-08-27 NOTE — PATIENT INSTRUCTIONS
Patient declined ambulance transfer  She would like to go via private vehicle to [facility] Emergency Department- she denies any current dizziness or lightheadedness  Please go to the [facility] Emergency Department now for further evaluation and treatment- hospital address verified with the patient  Patient agreed to go immediately to the ED

## 2021-08-28 ENCOUNTER — IMMUNIZATIONS (OUTPATIENT)
Dept: FAMILY MEDICINE CLINIC | Facility: HOSPITAL | Age: 31
End: 2021-08-28

## 2021-08-28 DIAGNOSIS — Z23 ENCOUNTER FOR IMMUNIZATION: Primary | ICD-10-CM

## 2021-08-28 PROCEDURE — 0002A SARS-COV-2 / COVID-19 MRNA VACCINE (PFIZER-BIONTECH) 30 MCG: CPT

## 2021-08-28 PROCEDURE — 91300 SARS-COV-2 / COVID-19 MRNA VACCINE (PFIZER-BIONTECH) 30 MCG: CPT

## 2021-08-29 LAB — BACTERIA UR CULT: ABNORMAL

## 2021-10-04 ENCOUNTER — CLINICAL SUPPORT (OUTPATIENT)
Dept: OBGYN CLINIC | Facility: CLINIC | Age: 31
End: 2021-10-04

## 2021-10-04 DIAGNOSIS — Z23 NEED FOR HPV VACCINATION: Primary | ICD-10-CM

## 2021-10-04 PROCEDURE — 90651 9VHPV VACCINE 2/3 DOSE IM: CPT

## 2021-10-04 PROCEDURE — 90471 IMMUNIZATION ADMIN: CPT

## 2022-04-05 NOTE — PROGRESS NOTES
Assessment/Plan:    No problem-specific Assessment & Plan notes found for this encounter  Annual due 2022     Diagnoses and all orders for this visit:    Menorrhagia with irregular cycle  -     US pelvis complete w transvaginal; Future  Call results to patient  Return to office in 2 weeks to review  Dyspareunia in female    Possible exposure to STD  -     [facility] amplified DNA by PCR    Vaginal odor  -     POCT wet mount          Subjective:      Patient ID: Keeley Rincon is a 34 y o  female  HPI 69-year-old  004 here with irregular menses  Gets a period monthly during withdrawal week of her pills but also has been having spotting for the past 3 months it begins 2 weeks after her menses begins and last until her next menses  Her menses typically are 8 days long  She reports her birth control pills were filled with a different pill and since then her periods have been irregular  She denies any missed pills  She does report vaginal odor, odor and discharge  She denies any itching  She reports she uses Monistat from time to time that helps  She is on Sprintec for menorrhagia with regular cycle, history of irregular menses after tubal   Her tubal was about 8 years ago and menses have been regular since then  At her annual 2021 her menses were monthly, last 4 days, moderate flow  Patient also reports dyspareunia but has had for a long time  Discussed with patient concern with her blood pressure today and history of migraines  Reviewed to change to a progesterone only products  Will follow-up in 2 weeks  The following portions of the patient's history were reviewed and updated as appropriate: allergies, current medications, past family history, past medical history, past social history, past surgical history and problem list     Review of Systems   Constitutional: Negative for chills and fever  Respiratory: Negative      Genitourinary: Positive for dyspareunia, menstrual problem and vaginal discharge  Negative for dysuria  Objective:      /80   Pulse 68   Ht 5' 4" (1 626 m)   Wt 74 8 kg (165 lb)   LMP 03/16/2022   BMI 28 32 kg/m²          Physical Exam  Constitutional:       Appearance: Normal appearance  Cardiovascular:      Rate and Rhythm: Normal rate  Pulmonary:      Effort: Pulmonary effort is normal    Abdominal:      Palpations: Abdomen is soft  Tenderness: There is no abdominal tenderness  Skin:     General: Skin is warm and dry  Neurological:      Mental Status: She is oriented to person, place, and time     Psychiatric:         Mood and Affect: Mood normal          Behavior: Behavior normal        external genitalia-no lesions no erythema  Vagina-small amount brown red discharge  Cervix-negative CMT no lesions  Uterus-anteverted, some tenderness with exam  Adnexa-no masses, nontender    Wet mount KOH-negative

## 2022-04-06 ENCOUNTER — OFFICE VISIT (OUTPATIENT)
Dept: OBGYN CLINIC | Facility: CLINIC | Age: 32
End: 2022-04-06

## 2022-04-06 VITALS
BODY MASS INDEX: 28.17 KG/M2 | HEART RATE: 68 BPM | SYSTOLIC BLOOD PRESSURE: 140 MMHG | WEIGHT: 165 LBS | HEIGHT: 64 IN | DIASTOLIC BLOOD PRESSURE: 80 MMHG

## 2022-04-06 DIAGNOSIS — Z20.2 POSSIBLE EXPOSURE TO STD: ICD-10-CM

## 2022-04-06 DIAGNOSIS — N94.10 DYSPAREUNIA IN FEMALE: ICD-10-CM

## 2022-04-06 DIAGNOSIS — N89.8 VAGINAL ODOR: ICD-10-CM

## 2022-04-06 DIAGNOSIS — N92.1 MENORRHAGIA WITH IRREGULAR CYCLE: Primary | ICD-10-CM

## 2022-04-06 LAB
BV WHIFF TEST VAG QL: NORMAL
CLUE CELLS SPEC QL WET PREP: NORMAL
PH SMN: 4 [PH]
SL AMB POCT WET MOUNT: NORMAL
T VAGINALIS VAG QL WET PREP: NORMAL
YEAST VAG QL WET PREP: NORMAL

## 2022-04-06 PROCEDURE — 87491 CHLMYD TRACH DNA AMP PROBE: CPT | Performed by: NURSE PRACTITIONER

## 2022-04-06 PROCEDURE — 87210 SMEAR WET MOUNT SALINE/INK: CPT | Performed by: NURSE PRACTITIONER

## 2022-04-06 PROCEDURE — 99213 OFFICE O/P EST LOW 20 MIN: CPT | Performed by: NURSE PRACTITIONER

## 2022-04-06 PROCEDURE — 87591 N.GONORRHOEAE DNA AMP PROB: CPT | Performed by: NURSE PRACTITIONER

## 2022-04-07 ENCOUNTER — TELEPHONE (OUTPATIENT)
Dept: OBGYN CLINIC | Facility: CLINIC | Age: 32
End: 2022-04-07

## 2022-04-07 LAB
C TRACH DNA SPEC QL NAA+PROBE: NEGATIVE
N GONORRHOEA DNA SPEC QL NAA+PROBE: NEGATIVE

## 2022-04-07 NOTE — TELEPHONE ENCOUNTER
----- Message from [employer], [address] sent at 4/7/2022  8:57 AM EDT -----   Result is negative, please call patient to inform

## 2022-04-13 ENCOUNTER — HOSPITAL ENCOUNTER (OUTPATIENT)
Dept: ULTRASOUND IMAGING | Facility: HOSPITAL | Age: 32
Discharge: HOME | End: 2022-04-13
Payer: MEDICARE

## 2022-04-13 DIAGNOSIS — N92.1 MENORRHAGIA WITH IRREGULAR CYCLE: ICD-10-CM

## 2022-04-13 PROCEDURE — 76856 US EXAM PELVIC COMPLETE: CPT

## 2022-04-13 PROCEDURE — 76830 TRANSVAGINAL US NON-OB: CPT

## 2022-04-19 NOTE — PROGRESS NOTES
Assessment/Plan:    No problem-specific Assessment & Plan notes found for this encounter  Diagnoses and all orders for this visit:    Menorrhagia with irregular cycle  Recommend checking TSH, CBC, CMP  Return to office for Embx  Postcoital bleeding    History of tubal ligation          Subjective:      Patient ID: Josie Grubbs is a 34 y o  female  HPI 39-year-old here for pelvic ultrasound results she had done for menorrhagia with irregular cycle, dyspareunia  Her US was normal   She reports she has had dyspareunia for very long time Sometimes has postcoital bleeding, does not occur all the time  She gets a monthly period during withdrawal week of her pills but also has been having spotting for the past 3 months that begins 2 wks after her menses begins and lasts until he rnext menses  She only has 1 week where she does not have any bleeding  Her menses have been lasting for about 8 days during withdrawal week  She has been on Sprintec to regulate her menses since her tubal about 8 years ago  Her menses have been regular until recently  She was wondering if it was her OCPs that were filled with a different kind of generic, however that was about 9 months ago  She does have history of thyroid disease in her sister, diabetes in her mother and maternal grandmother  Patient's blood pressure is within normal today at 125/82  She does report 30 lb weight loss the past year, which was desired by pt  She weighed 180 lbs 7/28/2020  Today is 165lbs  UTERUS:  The uterus is anteverted in position, measuring 8 3 x 4 6 x 6 0 cm  The uterus has a normal contour and echotexture  The cervix appears within normal limits      ENDOMETRIUM:    Normal endometrial AP caliber of 7 0 mm  Homogenous and normal in appearance         OVARIES/ADNEXA:  Right ovary:  2 7 x 1 7 x 1 7 cm  4 1 mL  No suspicious right ovarian abnormality  Doppler flow within normal limits      Left ovary:  2 5 x 1 1 x 1 0 cm  1 5 mL  No suspicious left ovarian abnormality  Doppler flow within normal limits      No suspicious adnexal mass or loculated collections  There is no free fluid      IMPRESSION:     Unremarkable study           The following portions of the patient's history were reviewed and updated as appropriate: allergies, current medications, past family history, past medical history, past social history, past surgical history and problem list     Review of Systems   Constitutional: Negative for chills and fever  Eyes: Negative for photophobia and visual disturbance  Respiratory: Negative  Cardiovascular: Negative  Genitourinary: Positive for dyspareunia and menstrual problem  Negative for dysuria and vaginal discharge  Objective:      /82 (BP Location: Left arm, Patient Position: Sitting, Cuff Size: Adult)   Pulse 90   Ht 5' 4" (1 626 m)   Wt 74 8 kg (165 lb)   LMP 04/19/2022   BMI 28 32 kg/m²          Physical Exam  Constitutional:       Appearance: Normal appearance  Cardiovascular:      Rate and Rhythm: Normal rate  Pulmonary:      Effort: Pulmonary effort is normal    Skin:     General: Skin is warm and dry  Neurological:      Mental Status: She is oriented to person, place, and time     Psychiatric:         Mood and Affect: Mood normal          Behavior: Behavior normal

## 2022-04-20 ENCOUNTER — APPOINTMENT (OUTPATIENT)
Dept: LAB | Facility: CLINIC | Age: 32
End: 2022-04-20
Payer: MEDICARE

## 2022-04-20 ENCOUNTER — OFFICE VISIT (OUTPATIENT)
Dept: OBGYN CLINIC | Facility: CLINIC | Age: 32
End: 2022-04-20

## 2022-04-20 VITALS
HEART RATE: 90 BPM | DIASTOLIC BLOOD PRESSURE: 82 MMHG | SYSTOLIC BLOOD PRESSURE: 125 MMHG | HEIGHT: 64 IN | WEIGHT: 165 LBS | BODY MASS INDEX: 28.17 KG/M2

## 2022-04-20 DIAGNOSIS — Z98.51 HISTORY OF TUBAL LIGATION: ICD-10-CM

## 2022-04-20 DIAGNOSIS — N92.1 MENORRHAGIA WITH IRREGULAR CYCLE: Primary | ICD-10-CM

## 2022-04-20 DIAGNOSIS — N92.1 MENORRHAGIA WITH IRREGULAR CYCLE: ICD-10-CM

## 2022-04-20 DIAGNOSIS — N93.0 POSTCOITAL BLEEDING: ICD-10-CM

## 2022-04-20 LAB
ALBUMIN SERPL BCP-MCNC: 3.2 G/DL (ref 3.5–5)
ALP SERPL-CCNC: 59 U/L (ref 46–116)
ALT SERPL W P-5'-P-CCNC: 24 U/L (ref 12–78)
ANION GAP SERPL CALCULATED.3IONS-SCNC: 8 MMOL/L (ref 4–13)
AST SERPL W P-5'-P-CCNC: 18 U/L (ref 5–45)
BASOPHILS # BLD AUTO: 0.04 THOUSANDS/ΜL (ref 0–0.1)
BASOPHILS NFR BLD AUTO: 1 % (ref 0–1)
BILIRUB SERPL-MCNC: 0.3 MG/DL (ref 0.2–1)
BUN SERPL-MCNC: 15 MG/DL (ref 5–25)
CALCIUM ALBUM COR SERPL-MCNC: 9.4 MG/DL (ref 8.3–10.1)
CALCIUM SERPL-MCNC: 8.8 MG/DL (ref 8.3–10.1)
CHLORIDE SERPL-SCNC: 104 MMOL/L (ref 100–108)
CO2 SERPL-SCNC: 25 MMOL/L (ref 21–32)
CREAT SERPL-MCNC: 0.83 MG/DL (ref 0.6–1.3)
EOSINOPHIL # BLD AUTO: 0.11 THOUSAND/ΜL (ref 0–0.61)
EOSINOPHIL NFR BLD AUTO: 2 % (ref 0–6)
ERYTHROCYTE [DISTWIDTH] IN BLOOD BY AUTOMATED COUNT: 12.6 % (ref 11.6–15.1)
GFR SERPL CREATININE-BSD FRML MDRD: 92 ML/MIN/1.73SQ M
GLUCOSE P FAST SERPL-MCNC: 89 MG/DL (ref 65–99)
HCT VFR BLD AUTO: 39 % (ref 34.8–46.1)
HGB BLD-MCNC: 12.8 G/DL (ref 11.5–15.4)
IMM GRANULOCYTES # BLD AUTO: 0.04 THOUSAND/UL (ref 0–0.2)
IMM GRANULOCYTES NFR BLD AUTO: 1 % (ref 0–2)
LYMPHOCYTES # BLD AUTO: 1.56 THOUSANDS/ΜL (ref 0.6–4.47)
LYMPHOCYTES NFR BLD AUTO: 25 % (ref 14–44)
MCH RBC QN AUTO: 30 PG (ref 26.8–34.3)
MCHC RBC AUTO-ENTMCNC: 32.8 G/DL (ref 31.4–37.4)
MCV RBC AUTO: 91 FL (ref 82–98)
MONOCYTES # BLD AUTO: 0.45 THOUSAND/ΜL (ref 0.17–1.22)
MONOCYTES NFR BLD AUTO: 7 % (ref 4–12)
NEUTROPHILS # BLD AUTO: 4.17 THOUSANDS/ΜL (ref 1.85–7.62)
NEUTS SEG NFR BLD AUTO: 64 % (ref 43–75)
NRBC BLD AUTO-RTO: 0 /100 WBCS
PLATELET # BLD AUTO: 355 THOUSANDS/UL (ref 149–390)
PMV BLD AUTO: 9.3 FL (ref 8.9–12.7)
POTASSIUM SERPL-SCNC: 3.9 MMOL/L (ref 3.5–5.3)
PROT SERPL-MCNC: 7.8 G/DL (ref 6.4–8.2)
RBC # BLD AUTO: 4.27 MILLION/UL (ref 3.81–5.12)
SODIUM SERPL-SCNC: 137 MMOL/L (ref 136–145)
TSH SERPL DL<=0.05 MIU/L-ACNC: 0.6 UIU/ML (ref 0.45–4.5)
WBC # BLD AUTO: 6.37 THOUSAND/UL (ref 4.31–10.16)

## 2022-04-20 PROCEDURE — 99213 OFFICE O/P EST LOW 20 MIN: CPT | Performed by: NURSE PRACTITIONER

## 2022-04-20 PROCEDURE — 85025 COMPLETE CBC W/AUTO DIFF WBC: CPT | Performed by: NURSE PRACTITIONER

## 2022-04-20 PROCEDURE — 80053 COMPREHEN METABOLIC PANEL: CPT

## 2022-04-20 PROCEDURE — 36415 COLL VENOUS BLD VENIPUNCTURE: CPT | Performed by: NURSE PRACTITIONER

## 2022-04-20 PROCEDURE — 84443 ASSAY THYROID STIM HORMONE: CPT

## 2022-04-22 ENCOUNTER — TELEPHONE (OUTPATIENT)
Dept: OBGYN CLINIC | Facility: CLINIC | Age: 32
End: 2022-04-22

## 2022-04-29 ENCOUNTER — PROCEDURE VISIT (OUTPATIENT)
Dept: OBGYN CLINIC | Facility: CLINIC | Age: 32
End: 2022-04-29

## 2022-04-29 VITALS
HEIGHT: 64 IN | BODY MASS INDEX: 27.83 KG/M2 | WEIGHT: 163 LBS | DIASTOLIC BLOOD PRESSURE: 85 MMHG | SYSTOLIC BLOOD PRESSURE: 124 MMHG | HEART RATE: 86 BPM

## 2022-04-29 DIAGNOSIS — N92.1 MENORRHAGIA WITH IRREGULAR CYCLE: Primary | ICD-10-CM

## 2022-04-29 PROCEDURE — 88305 TISSUE EXAM BY PATHOLOGIST: CPT | Performed by: PATHOLOGY

## 2022-04-29 PROCEDURE — 58100 BIOPSY OF UTERUS LINING: CPT | Performed by: OBSTETRICS & GYNECOLOGY

## 2022-04-29 NOTE — PROGRESS NOTES
Endometrial biopsy    Date/Time: 4/29/2022 8:07 AM  Performed by: Erika Cormier MD  Authorized by: Erika Cormier MD   [place] Protocol:  Procedure performed by: (Dr Ginette Finn)  Consent: Verbal consent obtained  Written consent obtained  Risks and benefits: risks, benefits and alternatives were discussed  Consent given by: patient  Patient understanding: patient states understanding of the procedure being performed  Patient consent: the patient's understanding of the procedure matches consent given  Procedure consent: procedure consent matches procedure scheduled  Relevant documents: relevant documents present and verified  Required items: required blood products, implants, devices, and special equipment available  Patient identity confirmed: verbally with patient      Indication:     Indications:  Other disorder of menstruation and other abnormal bleeding from female genital tract    Procedure:     Procedure: endometrial biopsy with [place]      A bivalve speculum was placed in the vagina: yes      Cervix cleaned and prepped: yes      A paracervical block was performed: no      An intracervical block was performed: no      The cervix was dilated: no      Uterus sounded: yes      Uterus sound depth (cm):  7    Specimen collected: specimen collected and sent to pathology      Patient tolerated procedure well with no complications: yes    Findings:     Uterus size:  Non-gravid    Cervix: normal

## 2022-05-19 ENCOUNTER — OFFICE VISIT (OUTPATIENT)
Dept: OBGYN CLINIC | Facility: CLINIC | Age: 32
End: 2022-05-19

## 2022-05-19 VITALS
WEIGHT: 167 LBS | SYSTOLIC BLOOD PRESSURE: 116 MMHG | HEIGHT: 64 IN | DIASTOLIC BLOOD PRESSURE: 78 MMHG | HEART RATE: 76 BPM | BODY MASS INDEX: 28.51 KG/M2

## 2022-05-19 DIAGNOSIS — N92.3 INTERMENSTRUAL BLEEDING: ICD-10-CM

## 2022-05-19 DIAGNOSIS — N93.0 POSTCOITAL BLEEDING: Primary | ICD-10-CM

## 2022-05-19 PROCEDURE — 99213 OFFICE O/P EST LOW 20 MIN: CPT | Performed by: OBSTETRICS & GYNECOLOGY

## 2022-05-19 NOTE — ASSESSMENT & PLAN NOTE
Patient desires hysteroscopy D&C with Mirena IUD placement for further management of her irregular vaginal bleeding    Will need a Pap smear at her preop H&P

## 2022-05-19 NOTE — PROGRESS NOTES
Assessment/Plan:    Postcoital bleeding  Patient desires hysteroscopy D&C with Mirena IUD placement for further management of her irregular vaginal bleeding  Will need a Pap smear at her preop H&P  Diagnoses and all orders for this visit:    Postcoital bleeding    Intermenstrual bleeding    Other orders  -     doxylamine (UNISOM) 25 MG tablet; Take 25 mg by mouth daily at bedtime as needed          Subjective:      Patient ID: Jarek Gomez is a 34 y o  female  Been a problem for "a few months", not yet a whole year  Has been on an OCP for eight years for heavy menstrual bleeding  Tubal for contraception after the birth of her youngest daughter  STD screening and EMB negative, Pap NILIM HPV neg in 2019  Normal TSH, no anemia, no family history of hemophilia or coagulopathy    Discussed medical versus surgical options including but not limited to hormonal contraceptives, D&C, ablation, and hysterectomy  Risks, benefits, and alternatives were discussed  Patient desires to proceed with hysteroscopy D&C with Mirena placeemtn  Discussed the bleeding pattern after IUD placement and encouraged to give it 2-6 months before removal     Vaginal Bleeding  The patient's primary symptoms include vaginal bleeding  This is a recurrent problem  The current episode started more than 1 month ago  The problem has been unchanged  The patient is experiencing no pain  She is not pregnant  The vaginal bleeding is spotting (intermenstrual and postcoital)  She has not been passing clots  She has not been passing tissue  The symptoms are aggravated by intercourse  Treatments tried: OCP  The treatment provided no relief  She is sexually active  No, her partner does not have an STD  She uses tubal ligation and oral contraceptives for contraception  Her menstrual history has been irregular  Her past medical history is significant for a gynecological surgery and menorrhagia             Review of Systems   Constitutional: Negative  HENT: Negative  Eyes: Negative  Respiratory: Negative  Cardiovascular: Negative  Gastrointestinal: Negative  Endocrine: Negative  Genitourinary: Positive for menorrhagia and vaginal bleeding  Musculoskeletal: Negative  Skin: Negative  Neurological: Negative  Psychiatric/Behavioral: Negative  Objective:      /78 (BP Location: Right arm, Patient Position: Sitting, Cuff Size: Adult)   Pulse 76   Ht 5' 4" (1 626 m)   Wt 75 8 kg (167 lb)   LMP 04/19/2022 (Exact Date)   Breastfeeding No   BMI 28 67 kg/m²          Physical Exam  Constitutional:       General: She is not in acute distress  Appearance: She is well-developed  She is not diaphoretic  HENT:      Head: Normocephalic and atraumatic  Eyes:      Pupils: Pupils are equal, round, and reactive to light  Neck:      Trachea: No tracheal deviation  Cardiovascular:      Rate and Rhythm: Normal rate  Pulmonary:      Effort: Pulmonary effort is normal  No respiratory distress  Breath sounds: No stridor  Abdominal:      General: Abdomen is flat  There is no distension  Genitourinary:     General: Normal vulva  Musculoskeletal:         General: No tenderness or deformity  Normal range of motion  Cervical back: Normal range of motion  Skin:     General: Skin is warm and dry  Coloration: Skin is not pale  Findings: No erythema or rash  Neurological:      Mental Status: She is alert and oriented to person, place, and time        Coordination: Coordination normal

## 2022-06-09 ENCOUNTER — TELEPHONE (OUTPATIENT)
Dept: OBGYN CLINIC | Facility: CLINIC | Age: 32
End: 2022-06-09

## 2022-06-09 NOTE — TELEPHONE ENCOUNTER
Patient discussed at gyn preop conference on 6/8/2022  Patient currently consented for hysteroscopy, D&C and Mirena IUD insertion  Had EMB in office which was negative  Given negative EMB, team will reach out to consenting provider to inquire about option for in office IUD placement       Valente Grant MD  Obstetrics & Gynecology PGY-1

## 2022-06-16 NOTE — TELEPHONE ENCOUNTER
Called patient as per Dr Nino Silva review  Patient is to have a colposcopy prior to surgery  Left message with call back telephone number  Please schedule patient for a colposcopy when she calls back

## 2022-06-23 ENCOUNTER — PROCEDURE VISIT (OUTPATIENT)
Dept: OBGYN CLINIC | Facility: CLINIC | Age: 32
End: 2022-06-23

## 2022-06-23 VITALS
HEIGHT: 64 IN | HEART RATE: 80 BPM | SYSTOLIC BLOOD PRESSURE: 143 MMHG | WEIGHT: 169 LBS | DIASTOLIC BLOOD PRESSURE: 72 MMHG | BODY MASS INDEX: 28.85 KG/M2

## 2022-06-23 DIAGNOSIS — N93.0 POSTCOITAL BLEEDING: Primary | ICD-10-CM

## 2022-06-23 LAB — SL AMB POCT URINE HCG: NEGATIVE

## 2022-06-23 PROCEDURE — 81025 URINE PREGNANCY TEST: CPT | Performed by: STUDENT IN AN ORGANIZED HEALTH CARE EDUCATION/TRAINING PROGRAM

## 2022-06-23 PROCEDURE — 57454 BX/CURETT OF CERVIX W/SCOPE: CPT | Performed by: STUDENT IN AN ORGANIZED HEALTH CARE EDUCATION/TRAINING PROGRAM

## 2022-06-23 PROCEDURE — 88305 TISSUE EXAM BY PATHOLOGIST: CPT | Performed by: PATHOLOGY

## 2022-06-23 NOTE — PROGRESS NOTES
Colposcopy     Date/Time 6/23/2022 9:59 AM     [place] Protocol   Procedure performed by: (Dr Jessica Palacios)  Consent: Verbal consent obtained  Written consent obtained  Risks and benefits: risks, benefits and alternatives were discussed  Consent given by: patient  Time out: Immediately prior to procedure a "time out" was called to verify the correct patient, procedure, equipment, support staff and site/side marked as required  Patient understanding: patient states understanding of the procedure being performed  Patient consent: the patient's understanding of the procedure matches consent given  Procedure consent: procedure consent matches procedure scheduled  Relevant documents: relevant documents present and verified  Test results: test results available and properly labeled  Patient identity confirmed: verbally with patient       Performed by  Fermín Dominique MD     Authorized by Fermín Dominique MD        Pre-procedure details     Pre-procedure timeout performed: yes      Premeds:  Ibuprofen    Prepped with: acetic acid       Procedure Details   Procedure: Colposcopy w/ cervical biopsy and ECC      Under satisfactory analgesia the patient was prepped and draped in the dorsal lithotomy position: yes      [place] speculum was placed in the vagina: yes      Under colposcopic examination the transition zone was seen in entirety: yes      Intracervical block was performed: no      Endocervix was curetted using a Kevorkian curette: yes      Cervical biopsy performed with a cervical biopsy punch: yes      Biopsy(s): yes      Location:  12:00, 6:00, ECC    Specimen to pathology: yes       Post-procedure      Findings: Bleeding      Impression: Low grade cervical dysplasia       Comments       Colposcopy performed due to patient's history of post-coital bleeding   Clefts noted near os, otherwise cervix appeared grossly normal

## 2022-07-05 ENCOUNTER — TELEPHONE (OUTPATIENT)
Dept: OBGYN CLINIC | Facility: CLINIC | Age: 32
End: 2022-07-05

## 2022-07-05 NOTE — TELEPHONE ENCOUNTER
Spoke with pt today , per Pathology dept  , tissue exam not completed and will need at least another week  Pt has been rescheduled for 7/12  With / Mckenzie Alvarez

## 2022-07-13 ENCOUNTER — PREP FOR PROCEDURE (OUTPATIENT)
Dept: OBGYN CLINIC | Facility: CLINIC | Age: 32
End: 2022-07-13

## 2022-07-13 ENCOUNTER — TELEPHONE (OUTPATIENT)
Dept: OBGYN CLINIC | Facility: CLINIC | Age: 32
End: 2022-07-13

## 2022-07-13 DIAGNOSIS — N93.0 PCB (POST COITAL BLEEDING): Primary | ICD-10-CM

## 2022-07-13 DIAGNOSIS — N92.1 MENORRHAGIA WITH IRREGULAR CYCLE: Primary | ICD-10-CM

## 2022-07-13 PROBLEM — N92.0 MENORRHAGIA WITH REGULAR CYCLE: Status: RESOLVED | Noted: 2018-06-01 | Resolved: 2022-07-13

## 2022-07-13 PROBLEM — Z11.3 SCREEN FOR STD (SEXUALLY TRANSMITTED DISEASE): Status: RESOLVED | Noted: 2019-07-25 | Resolved: 2022-07-13

## 2022-07-13 PROBLEM — Z01.419 ENCOUNTER FOR ANNUAL ROUTINE GYNECOLOGICAL EXAMINATION: Status: RESOLVED | Noted: 2019-07-25 | Resolved: 2022-07-13

## 2022-07-13 PROBLEM — Z20.2 POSSIBLE EXPOSURE TO STD: Status: RESOLVED | Noted: 2022-04-06 | Resolved: 2022-07-13

## 2022-07-13 RX ORDER — LEVONORGESTREL 52 MG/1
1 INTRAUTERINE DEVICE INTRAUTERINE ONCE
Qty: 1 INTRA UTERINE DEVICE | Refills: 0 | Status: SHIPPED | OUTPATIENT
Start: 2022-07-13 | End: 2022-07-13

## 2022-07-13 NOTE — H&P (VIEW-ONLY)
H&P Exam - Gynecology   Kushal Mehta 34 y o  female MRN: [id number]  Encounter: [id number]    Assessment: Kushal Mehta is a 34 y o  female who presents for preoperative evaluation prior to a scheduled D&C hysteroscopy with Mirena IUD placement on 7/22/22  Plan:  Problem List Items Addressed This Visit        Other    Menorrhagia with irregular cycle     Not improved with OCPs  D&C with Mirena IUD placement scheduled for 7/22/22           History of tubal ligation     Performed laparoscopically 1 month after last delivery           Intermenstrual bleeding - Primary     EMB 4/29/22 benign  Colpo 6/23/22 benign  Plan to follow up [facility] pathology after D&C next week                   History of Present Illness   HPI:  Kushal Mehta is a 34 y o  female who presents for [facility]  hysteroscopy with Mirena IUD as treatment for heavy menstrual bleeding not improved by OCPs  Discussed medical versus surgical options including but not limited to hormonal contraceptives, D&C, ablation, and hysterectomy  Risks, benefits, and alternatives were discussed  Patient desires to proceed with hysteroscopy D&C with Mirena placement  Discussed the bleeding pattern after IUD placement and encouraged to give it 2-6 months before removal     Review of Systems   Constitutional: Negative  HENT: Negative  Eyes: Negative  Respiratory: Negative  Cardiovascular: Negative  Gastrointestinal: Negative  Genitourinary: Negative  Musculoskeletal: Negative  Neurological: Negative  Psychiatric/Behavioral: Negative  All other systems reviewed and are negative        Historical Information   Past Medical History:   Diagnosis Date    Anemia     Last assessed 4/10/2015    Class 1 obesity due to excess calories without serious comorbidity with body mass index (BMI) of 33 0 to 33 9 in adult 6/18/2014    Encounter for annual routine gynecological examination 7/25/2019    Heart murmur     Hemorrhoids     Irregular menses 2016    Menorrhagia with regular cycle 2018    Migraine     Obesity     Last assessed 2013    Possible exposure to STD 2022    Screen for STD (sexually transmitted disease) 2019    Seasonal allergies      Past Surgical History:   Procedure Laterality Date    ABDOMINOPLASTY      RI HEMORRHOIDECTOMY,INT/EXT, 2+ COLUMNS/GROUPS N/A 3/10/2017    Procedure: HEMORRHOIDECTOMY ;  Surgeon: Tim Peace MD;  [facility]: [facility];  Service: Colorectal    TUBAL LIGATION       OB History    Para Term  AB Living   4 4 4     4   SAB IAB Ectopic Multiple Live Births           4      # Outcome Date GA Lbr Delgado/2nd Weight Sex Delivery Anes PTL Lv   4 Term            3 Term            2 Term            1 Term              Family History   Problem Relation Age of Onset    Anemia Mother     Hypertension Mother     Diabetes Mother     Down syndrome Brother     Diabetes Maternal Grandmother     Alzheimer's disease Paternal Grandmother     No Known Problems Father     Thyroid disease Sister     No Known Problems Daughter     No Known Problems Son     No Known Problems Sister     No Known Problems Son     No Known Problems Daughter     Breast cancer Maternal Aunt      Social History   Social History     Substance and Sexual Activity   Alcohol Use Yes    Comment: socially     Social History     Substance and Sexual Activity   Drug Use Not Currently    Types: Marijuana     Social History     Tobacco Use   Smoking Status Never Smoker   Smokeless Tobacco Never Used       Allergies   Allergen Reactions    Pollen Extract        Objective   /76   Pulse 94   Ht 5' 4" (1 626 m)   Wt 77 1 kg (170 lb)   LMP 2022   BMI 29 18 kg/m²     Physical Exam  Vitals reviewed  Exam conducted with a chaperone present  Constitutional:       General: She is not in acute distress  Appearance: Normal appearance  She is normal weight     HENT: Mouth/Throat:      Mouth: Mucous membranes are moist       Pharynx: Oropharynx is clear  Eyes:      Conjunctiva/sclera: Conjunctivae normal       Pupils: Pupils are equal, round, and reactive to light  Cardiovascular:      Rate and Rhythm: Normal rate and regular rhythm  Pulses: Normal pulses  Heart sounds: Normal heart sounds  Pulmonary:      Effort: Pulmonary effort is normal  No respiratory distress  Breath sounds: Normal breath sounds  Abdominal:      General: Abdomen is flat  There is no distension  Palpations: Abdomen is soft  Tenderness: There is no abdominal tenderness  Musculoskeletal:         General: Normal range of motion  Skin:     General: Skin is warm and dry  Neurological:      General: No focal deficit present  Mental Status: She is alert and oriented to person, place, and time  Mental status is at baseline                 Guzman Osborne MD  7/14/2022  11:28 AM

## 2022-07-13 NOTE — PROGRESS NOTES
H&P Exam - Gynecology   Silver Paul 34 y o  female MRN: [id number]  Encounter: [id number]    Assessment: Silver Paul is a 34 y o  female who presents for preoperative evaluation prior to a scheduled D&C hysteroscopy with Mirena IUD placement on 7/22/22  Plan:  Problem List Items Addressed This Visit        Other    Menorrhagia with irregular cycle     Not improved with OCPs  D&C with Mirena IUD placement scheduled for 7/22/22           History of tubal ligation     Performed laparoscopically 1 month after last delivery           Intermenstrual bleeding - Primary     EMB 4/29/22 benign  Colpo 6/23/22 benign  Plan to follow up [facility] pathology after D&C next week                   History of Present Illness   HPI:  Silver Paul is a 34 y o  female who presents for [facility]  hysteroscopy with Mirena IUD as treatment for heavy menstrual bleeding not improved by OCPs  Discussed medical versus surgical options including but not limited to hormonal contraceptives, D&C, ablation, and hysterectomy  Risks, benefits, and alternatives were discussed  Patient desires to proceed with hysteroscopy D&C with Mirena placement  Discussed the bleeding pattern after IUD placement and encouraged to give it 2-6 months before removal     Review of Systems   Constitutional: Negative  HENT: Negative  Eyes: Negative  Respiratory: Negative  Cardiovascular: Negative  Gastrointestinal: Negative  Genitourinary: Negative  Musculoskeletal: Negative  Neurological: Negative  Psychiatric/Behavioral: Negative  All other systems reviewed and are negative        Historical Information   Past Medical History:   Diagnosis Date    Anemia     Last assessed 4/10/2015    Class 1 obesity due to excess calories without serious comorbidity with body mass index (BMI) of 33 0 to 33 9 in adult 6/18/2014    Encounter for annual routine gynecological examination 7/25/2019    Heart murmur     Hemorrhoids     Irregular menses 2016    Menorrhagia with regular cycle 2018    Migraine     Obesity     Last assessed 2013    Possible exposure to STD 2022    Screen for STD (sexually transmitted disease) 2019    Seasonal allergies      Past Surgical History:   Procedure Laterality Date    ABDOMINOPLASTY      [place] HEMORRHOIDECTOMY,INT/EXT, 2+ COLUMNS/GROUPS N/A 3/10/2017    Procedure: HEMORRHOIDECTOMY ;  Surgeon: Flora Mederos MD;  [facility];  Service: Colorectal    TUBAL LIGATION       OB History    Para Term  AB Living   4 4 4     4   SAB IAB Ectopic Multiple Live Births           4      # Outcome Date GA Lbr Delgado/2nd Weight Sex Delivery Anes PTL Lv   4 Term            3 Term            2 Term            1 Term              Family History   Problem Relation Age of Onset    Anemia Mother     Hypertension Mother     Diabetes Mother     Down syndrome Brother     Diabetes Maternal Grandmother     Alzheimer's disease Paternal Grandmother     No Known Problems Father     Thyroid disease Sister     No Known Problems Daughter     No Known Problems Son     No Known Problems Sister     No Known Problems Son     No Known Problems Daughter     Breast cancer Maternal Aunt      Social History   Social History     Substance and Sexual Activity   Alcohol Use Yes    Comment: socially     Social History     Substance and Sexual Activity   Drug Use Not Currently    Types: Marijuana     Social History     Tobacco Use   Smoking Status Never Smoker   Smokeless Tobacco Never Used       Allergies   Allergen Reactions    Pollen Extract        Objective   /76   Pulse 94   Ht 5' 4" (1 626 m)   Wt 77 1 kg (170 lb)   LMP 2022   BMI 29 18 kg/m²     Physical Exam  Vitals reviewed  Exam conducted with a chaperone present  Constitutional:       General: She is not in acute distress  Appearance: Normal appearance  She is normal weight     HENT: Mouth/Throat:      Mouth: Mucous membranes are moist       Pharynx: Oropharynx is clear  Eyes:      Conjunctiva/sclera: Conjunctivae normal       Pupils: Pupils are equal, round, and reactive to light  Cardiovascular:      Rate and Rhythm: Normal rate and regular rhythm  Pulses: Normal pulses  Heart sounds: Normal heart sounds  Pulmonary:      Effort: Pulmonary effort is normal  No respiratory distress  Breath sounds: Normal breath sounds  Abdominal:      General: Abdomen is flat  There is no distension  Palpations: Abdomen is soft  Tenderness: There is no abdominal tenderness  Musculoskeletal:         General: Normal range of motion  Skin:     General: Skin is warm and dry  Neurological:      General: No focal deficit present  Mental Status: She is alert and oriented to person, place, and time  Mental status is at baseline                 Fuad Aldana MD  7/14/2022  11:28 AM

## 2022-07-13 NOTE — ASSESSMENT & PLAN NOTE
EMB 4/29/22 benign  Colpo 6/23/22 benign  Plan to follow up [facility] pathology after D&C next week

## 2022-07-13 NOTE — TELEPHONE ENCOUNTER
----- Message from Charmayne New, MD sent at 7/10/2022  5:28 PM EDT -----  Please inform patient of normal colposcopy results

## 2022-07-14 ENCOUNTER — OFFICE VISIT (OUTPATIENT)
Dept: OBGYN CLINIC | Facility: CLINIC | Age: 32
End: 2022-07-14

## 2022-07-14 VITALS
BODY MASS INDEX: 29.02 KG/M2 | SYSTOLIC BLOOD PRESSURE: 124 MMHG | DIASTOLIC BLOOD PRESSURE: 76 MMHG | HEART RATE: 94 BPM | HEIGHT: 64 IN | WEIGHT: 170 LBS

## 2022-07-14 DIAGNOSIS — N92.1 MENORRHAGIA WITH IRREGULAR CYCLE: ICD-10-CM

## 2022-07-14 DIAGNOSIS — Z98.51 HISTORY OF TUBAL LIGATION: ICD-10-CM

## 2022-07-14 DIAGNOSIS — N92.3 INTERMENSTRUAL BLEEDING: Primary | ICD-10-CM

## 2022-07-14 PROCEDURE — 99213 OFFICE O/P EST LOW 20 MIN: CPT | Performed by: OBSTETRICS & GYNECOLOGY

## 2022-07-19 NOTE — PRE-PROCEDURE INSTRUCTIONS
Pre-Surgery Instructions:   Medication Instructions    norgestimate-ethinyl estradiol (Sprintec 28) 0 25-35 MG-MCG per tablet Take day of surgery  Have you had / have a sore throat? No  Have you had / have a cough less than 1 week? No  Have you had / have a fever greater than 100 0 - 100  4? No  Are you experiencing any shortness of breath? No    Review with patient via phone medications and showering instructions  Advised don't take NSAID's, ok tylenol products  Advised ASC call with surgery schedule time, nothing eat or drink after midnight  Verbalized understanding

## 2022-07-22 ENCOUNTER — HOSPITAL ENCOUNTER (OUTPATIENT)
Facility: HOSPITAL | Age: 32
Setting detail: OUTPATIENT SURGERY
Discharge: HOME | End: 2022-07-22
Attending: OBSTETRICS & GYNECOLOGY | Admitting: OBSTETRICS & GYNECOLOGY
Payer: MEDICARE

## 2022-07-22 ENCOUNTER — ANESTHESIA EVENT (OUTPATIENT)
Dept: PERIOP | Facility: HOSPITAL | Age: 32
End: 2022-07-22
Payer: MEDICARE

## 2022-07-22 ENCOUNTER — ANESTHESIA (OUTPATIENT)
Dept: PERIOP | Facility: HOSPITAL | Age: 32
End: 2022-07-22
Payer: MEDICARE

## 2022-07-22 VITALS
HEART RATE: 75 BPM | TEMPERATURE: 96.7 F | DIASTOLIC BLOOD PRESSURE: 83 MMHG | BODY MASS INDEX: 29.02 KG/M2 | RESPIRATION RATE: 16 BRPM | OXYGEN SATURATION: 100 % | WEIGHT: 170 LBS | SYSTOLIC BLOOD PRESSURE: 132 MMHG | HEIGHT: 64 IN

## 2022-07-22 DIAGNOSIS — N93.0 PCB (POST COITAL BLEEDING): ICD-10-CM

## 2022-07-22 LAB
EXT PREGNANCY TEST URINE: NEGATIVE
EXT. CONTROL: NORMAL

## 2022-07-22 PROCEDURE — 88305 TISSUE EXAM BY PATHOLOGIST: CPT | Performed by: PATHOLOGY

## 2022-07-22 PROCEDURE — 81025 URINE PREGNANCY TEST: CPT | Performed by: OBSTETRICS & GYNECOLOGY

## 2022-07-22 PROCEDURE — 58558 HYSTEROSCOPY BIOPSY: CPT | Performed by: OBSTETRICS & GYNECOLOGY

## 2022-07-22 PROCEDURE — 58300 INSERT INTRAUTERINE DEVICE: CPT | Performed by: OBSTETRICS & GYNECOLOGY

## 2022-07-22 RX ORDER — FENTANYL CITRATE/PF 50 MCG/ML
25 SYRINGE (ML) INJECTION
Status: DISCONTINUED | OUTPATIENT
Start: 2022-07-22 | End: 2022-07-22 | Stop reason: HOSPADM

## 2022-07-22 RX ORDER — ACETAMINOPHEN 325 MG/1
975 TABLET ORAL EVERY 8 HOURS SCHEDULED
Status: DISCONTINUED | OUTPATIENT
Start: 2022-07-22 | End: 2022-07-22 | Stop reason: HOSPADM

## 2022-07-22 RX ORDER — MAGNESIUM HYDROXIDE 1200 MG/15ML
LIQUID ORAL AS NEEDED
Status: DISCONTINUED | OUTPATIENT
Start: 2022-07-22 | End: 2022-07-22 | Stop reason: HOSPADM

## 2022-07-22 RX ORDER — PROPOFOL 10 MG/ML
INJECTION, EMULSION INTRAVENOUS AS NEEDED
Status: DISCONTINUED | OUTPATIENT
Start: 2022-07-22 | End: 2022-07-22

## 2022-07-22 RX ORDER — LIDOCAINE HYDROCHLORIDE 10 MG/ML
INJECTION, SOLUTION EPIDURAL; INFILTRATION; INTRACAUDAL; PERINEURAL AS NEEDED
Status: DISCONTINUED | OUTPATIENT
Start: 2022-07-22 | End: 2022-07-22

## 2022-07-22 RX ORDER — ONDANSETRON 2 MG/ML
4 INJECTION INTRAMUSCULAR; INTRAVENOUS EVERY 6 HOURS PRN
Status: DISCONTINUED | OUTPATIENT
Start: 2022-07-22 | End: 2022-07-22 | Stop reason: HOSPADM

## 2022-07-22 RX ORDER — HYDROMORPHONE HCL IN WATER/PF 6 MG/30 ML
0.2 PATIENT CONTROLLED ANALGESIA SYRINGE INTRAVENOUS
Status: DISCONTINUED | OUTPATIENT
Start: 2022-07-22 | End: 2022-07-22 | Stop reason: HOSPADM

## 2022-07-22 RX ORDER — KETOROLAC TROMETHAMINE 30 MG/ML
INJECTION, SOLUTION INTRAMUSCULAR; INTRAVENOUS AS NEEDED
Status: DISCONTINUED | OUTPATIENT
Start: 2022-07-22 | End: 2022-07-22

## 2022-07-22 RX ORDER — MIDAZOLAM HYDROCHLORIDE 2 MG/2ML
INJECTION, SOLUTION INTRAMUSCULAR; INTRAVENOUS AS NEEDED
Status: DISCONTINUED | OUTPATIENT
Start: 2022-07-22 | End: 2022-07-22

## 2022-07-22 RX ORDER — FENTANYL CITRATE 50 UG/ML
INJECTION, SOLUTION INTRAMUSCULAR; INTRAVENOUS AS NEEDED
Status: DISCONTINUED | OUTPATIENT
Start: 2022-07-22 | End: 2022-07-22

## 2022-07-22 RX ORDER — SODIUM CHLORIDE, SODIUM LACTATE, POTASSIUM CHLORIDE, CALCIUM CHLORIDE 600; 310; 30; 20 MG/100ML; MG/100ML; MG/100ML; MG/100ML
50 INJECTION, SOLUTION INTRAVENOUS CONTINUOUS
Status: DISCONTINUED | OUTPATIENT
Start: 2022-07-22 | End: 2022-07-22 | Stop reason: HOSPADM

## 2022-07-22 RX ORDER — ONDANSETRON 2 MG/ML
4 INJECTION INTRAMUSCULAR; INTRAVENOUS ONCE AS NEEDED
Status: DISCONTINUED | OUTPATIENT
Start: 2022-07-22 | End: 2022-07-22 | Stop reason: HOSPADM

## 2022-07-22 RX ORDER — SODIUM CHLORIDE, SODIUM LACTATE, POTASSIUM CHLORIDE, CALCIUM CHLORIDE 600; 310; 30; 20 MG/100ML; MG/100ML; MG/100ML; MG/100ML
INJECTION, SOLUTION INTRAVENOUS CONTINUOUS PRN
Status: DISCONTINUED | OUTPATIENT
Start: 2022-07-22 | End: 2022-07-22

## 2022-07-22 RX ORDER — SODIUM CHLORIDE, SODIUM LACTATE, POTASSIUM CHLORIDE, CALCIUM CHLORIDE 600; 310; 30; 20 MG/100ML; MG/100ML; MG/100ML; MG/100ML
125 INJECTION, SOLUTION INTRAVENOUS CONTINUOUS
Status: DISCONTINUED | OUTPATIENT
Start: 2022-07-22 | End: 2022-07-22 | Stop reason: HOSPADM

## 2022-07-22 RX ORDER — DEXAMETHASONE SODIUM PHOSPHATE 10 MG/ML
INJECTION, SOLUTION INTRAMUSCULAR; INTRAVENOUS AS NEEDED
Status: DISCONTINUED | OUTPATIENT
Start: 2022-07-22 | End: 2022-07-22

## 2022-07-22 RX ORDER — ONDANSETRON 2 MG/ML
INJECTION INTRAMUSCULAR; INTRAVENOUS AS NEEDED
Status: DISCONTINUED | OUTPATIENT
Start: 2022-07-22 | End: 2022-07-22

## 2022-07-22 RX ADMIN — ONDANSETRON 4 MG: 2 INJECTION INTRAMUSCULAR; INTRAVENOUS at 12:50

## 2022-07-22 RX ADMIN — LIDOCAINE HYDROCHLORIDE 50 MG: 10 INJECTION, SOLUTION EPIDURAL; INFILTRATION; INTRACAUDAL at 12:46

## 2022-07-22 RX ADMIN — Medication 25 MCG: at 13:55

## 2022-07-22 RX ADMIN — MIDAZOLAM 1 MG: 1 INJECTION INTRAMUSCULAR; INTRAVENOUS at 12:41

## 2022-07-22 RX ADMIN — KETOROLAC TROMETHAMINE 30 MG: 30 INJECTION, SOLUTION INTRAMUSCULAR; INTRAVENOUS at 13:19

## 2022-07-22 RX ADMIN — PROPOFOL 200 MG: 10 INJECTION, EMULSION INTRAVENOUS at 12:46

## 2022-07-22 RX ADMIN — FENTANYL CITRATE 25 MCG: 50 INJECTION INTRAMUSCULAR; INTRAVENOUS at 13:04

## 2022-07-22 RX ADMIN — DEXAMETHASONE SODIUM PHOSPHATE 5 MG: 10 INJECTION, SOLUTION INTRAMUSCULAR; INTRAVENOUS at 12:50

## 2022-07-22 RX ADMIN — LEVONORGESTREL 1 INTRA UTERINE DEVICE: 52 INTRAUTERINE DEVICE INTRAUTERINE at 13:23

## 2022-07-22 RX ADMIN — SODIUM CHLORIDE, SODIUM LACTATE, POTASSIUM CHLORIDE, AND CALCIUM CHLORIDE: .6; .31; .03; .02 INJECTION, SOLUTION INTRAVENOUS at 12:40

## 2022-07-22 RX ADMIN — FENTANYL CITRATE 25 MCG: 50 INJECTION INTRAMUSCULAR; INTRAVENOUS at 12:57

## 2022-07-22 RX ADMIN — MIDAZOLAM 1 MG: 1 INJECTION INTRAMUSCULAR; INTRAVENOUS at 12:39

## 2022-07-22 RX ADMIN — Medication 25 MCG: at 13:36

## 2022-07-22 NOTE — ANESTHESIA PREPROCEDURE EVALUATION
Procedure:  DILATATION AND CURETTAGE (D&C) WITH HYSTEROSCOPY (N/A Uterus)  INSERTION OF INTRAUTERINE DEVICE (IUD) (N/A Uterus)    Relevant Problems   MUSCULOSKELETAL   (+) Right sciatic nerve pain      NEURO/PSYCH   (+) Chronic daily headache   (+) Panic attacks      Other   (+) Daytime somnolence   (+) Dyspareunia in female   (+) Menorrhagia with irregular cycle   (+) Postcoital bleeding        Physical Exam    Airway    Mallampati score: II  TM Distance: >3 FB  Neck ROM: full     Dental       Cardiovascular      Pulmonary      Other Findings        Anesthesia Plan  ASA Score- 2     Anesthesia Type- general with ASA Monitors  Additional Monitors:   Airway Plan: LMA  Plan Factors-Exercise tolerance (METS): >4 METS  Chart reviewed  Existing labs reviewed  Patient summary reviewed  Induction- intravenous  Postoperative Plan- Plan for postoperative opioid use  Planned trial extubation    Informed Consent- Anesthetic plan and risks discussed with patient  I personally reviewed this patient with the CRNA  Discussed and agreed on the Anesthesia Plan with the CRNA  Shira Kaminski

## 2022-07-22 NOTE — ANESTHESIA POSTPROCEDURE EVALUATION
Post-Op Assessment Note    CV Status:  Stable  Pain Score: 0    Pain management: adequate     Mental Status:  Alert and awake   Hydration Status:  Euvolemic   PONV Controlled:  Controlled   Airway Patency:  Patent      Post Op Vitals Reviewed: Yes      Staff: Anesthesiologist, CRNA         No complications documented      BP   140/81   Temp   99 2   Pulse   89   Resp  12   SpO2   97% RA

## 2022-07-22 NOTE — OP NOTE
OPERATIVE REPORT  PATIENT NAME: Annette Cota    :  1990  MRN: [id number]  Pt Location: [facility] OR ROOM 06    SURGERY DATE: 2022    Surgeon(s) and Role:     * Jc Aguilera MD - Primary     * Adamaris Heaton MD - Assisting    Preop Diagnosis:  PCB (post coital bleeding) [N93 0]    Post-Op Diagnosis Codes:     * PCB (post coital bleeding) [N93 0]    Procedure(s) (LRB):  DILATATION AND CURETTAGE (D&C) WITH HYSTEROSCOPY (N/A)  INSERTION OF INTRAUTERINE DEVICE (IUD) (N/A)    Specimen(s):  ID Type Source Tests Collected by Time Destination   1 : Endometrial Curettings Tissue Endometrium TISSUE EXAM Jc Aguilera MD 2022 1306        Estimated Blood Loss:   20 cc    Drains:  none    Anesthesia Type:   General    Operative Indications:  Menorrhagia   Post-coital bleeding     Operative Findings:  Normal-appearing external genitalia, vaginal mucosa, and cervix  On bimanual exam, Stage II apical prolapse noted, with the cervix almost to the level of the introitus on exam under anesthesia  Would possibly be a candidate for transvaginal hysterectomy in the future if it were indicated  On hysteroscopy, fluffy, thickened endometrium evident  Patient also currently menstruating, blood clot seen  Bilateral ostia visualized and appear patent  Complications:   None    Procedure and Technique:  Patient was brought to the operating room, identified, and transitioned to the operating table  General anesthesia was established without complication  Patient was placed in dorsal lithotomy with legs in Yellofin stirrups  All pressure points were padded  Perineum was prepped with chlorhexidine and allowed to dry  Surgical drapes were applied  Surgical time-out was performed, all were in agreement  Speculum and [place] retractor were used to visualize the cervix, which was then grasped with a single-toothed tenaculum  The uterus was sounded to 8 cm  The cervix was already partially dilated    30 degree hysteroscope was then inserted into the uterus using normal saline as a distention media  Above operative findings were noted  There was minimal fluid deficit  Hysteroscope was then withdrawn  Sharp endometrial curetting was then performed on all surfaces of the uterine lining  Tissue was obtained and sent for routine pathology  The Mirena IUD was then taken out of its packaging  Device  was adjusted to 8 cm to match uterine length  Device  was then inserted into the uterus  IUD device was deployed   was removed  Strings were cut long  The tenaculum was removed off of the cervix  Hemostasis was noted  Surgical drapes were removed  Patient was cleaned  Anesthesia was reversed without complication  She was taken to PACU in stable condition  Dr Khushi Louise was present for the entire procedure       Patient Disposition:  PACU       SIGNATURE: Talib Mejía MD  DATE: July 22, 2022  TIME: 1:31 PM

## 2022-07-22 NOTE — INTERVAL H&P NOTE
H&P reviewed  After examining the patient I find no changes in the patients condition since the H&P had been written  Post op instr given  RTO 2 wks  Advil or Tylenol prn pain    Vitals:    07/22/22 1113   BP: 152/99   Pulse: 80   Resp: 18   Temp: (!) 97 3 °F (36 3 °C)   SpO2: 99%

## 2022-07-22 NOTE — INTERVAL H&P NOTE
H&P reviewed  Vitals reviewed  After examining the patient I find no changes in the patients condition since the H&P had been written  Patient states she did start bleeding again yesterday  Was dark yesterday, today a little heavier  No other changes in her health since last office visit  Reviewed procedure again in detail  Plan to proceed ith hysteroscopy, D&C, and Mirena IUD placement       Vitals:    07/22/22 1113   BP: 152/99   Pulse: 80   Resp: 18   Temp: (!) 97 3 °F (36 3 °C)   SpO2: 99%

## 2022-08-09 ENCOUNTER — OFFICE VISIT (OUTPATIENT)
Dept: OBGYN CLINIC | Facility: CLINIC | Age: 32
End: 2022-08-09

## 2022-08-09 VITALS
BODY MASS INDEX: 30.46 KG/M2 | HEART RATE: 77 BPM | SYSTOLIC BLOOD PRESSURE: 122 MMHG | TEMPERATURE: 98.4 F | WEIGHT: 178.4 LBS | DIASTOLIC BLOOD PRESSURE: 80 MMHG | HEIGHT: 64 IN

## 2022-08-09 DIAGNOSIS — N92.1 MENORRHAGIA WITH IRREGULAR CYCLE: Primary | ICD-10-CM

## 2022-08-09 PROCEDURE — 99213 OFFICE O/P EST LOW 20 MIN: CPT | Performed by: OBSTETRICS & GYNECOLOGY

## 2022-08-09 NOTE — PROGRESS NOTES
Subjective    Patient is a 34year old  who presents today after undergoing a D&C hysteroscopy and Mirena IUD insertion on 22 due to heavy, irregular menses  She reports some cramping, but otherwise feels well  She is due to get her period this week  She has no other complaints  OB History        4    Para   4    Term   4            AB        Living   4       SAB        IAB        Ectopic        Multiple        Live Births   4                    Objective    Vitals:    22 1642   BP: 122/80   Pulse: 77   Temp: 98 4 °F (36 9 °C)   TempSrc: Oral   Weight: 80 9 kg (178 lb 6 4 oz)   Height: 5' 4" (1 626 m)        Physical Exam  Constitutional:       Appearance: Normal appearance  Genitourinary:      Vulva normal       Genitourinary Comments: IUD strings visible at cervical os; cervix appears normal   HENT:      Head: Normocephalic and atraumatic  Cardiovascular:      Rate and Rhythm: Normal rate  Pulmonary:      Effort: Pulmonary effort is normal  No respiratory distress  Neurological:      Mental Status: She is alert            Assessment    Patient Active Problem List   Diagnosis    Seasonal allergies    Other constipation    Chronic daily headache    Right sciatic nerve pain    Panic attacks    Snoring    Daytime somnolence    Nasal congestion    Acne vulgaris    Encounter for surveillance of contraceptive pills    Menorrhagia with irregular cycle    Dyspareunia in female    Postcoital bleeding    History of tubal ligation    Intermenstrual bleeding        Plan  - Reviewed benign pathology from D&C  - Mirena strings visualized at cervical os  - Reviewed that bleeding and cramping may persist for 3-6 months

## 2022-08-16 NOTE — PROGRESS NOTES
ASSESSMENT & PLAN: Almas Zamarripa is a 34 y o  [id number] with normal gynecologic exam     1   Routine well woman exam done today  2  Pap and HPV:  The patient's last pap and hpv was 07/25/2019 and was negative    It was normal   Previous Pap done 02/04/2016 was also negative  Pap and cotesting was not done today  Current ASCCP Guidelines reviewed  Next due 07/2024  3  The following were reviewed in today's visit: breast self exam, STD testing, adequate intake of calcium and vitamin D, exercise and healthy diet  4  Had 2 doses of Gardisil, last 10/4/2021, will get 3rd dose today  5  Mirena IUD place 7/22/22 placed for heavy bleeding effective for 5 years 7/2027    Depression Screening Follow-up Plan: Patient's depression screening was positive with a PHQ-2 score of 0  Their PHQ-9 score was 0  Clinically patient does not have depression  No treatment is required  BMI Counseling: Body mass index is 30 73 kg/m²  The BMI is above normal  Nutrition recommendations include reducing portion sizes, 3-5 servings of fruits/vegetables daily, moderation in carbohydrate intake and reducing intake of cholesterol  Exercise recommendations include exercising 3-5 times per week  CC:  Annual Gynecologic Examination    HPI: Almas Zamarripa is a 34 y o  [id number] who presents for annual gynecologic examination  Last Pap and HPV 07/25/2019 and was negative  She underwent D & C hysteroscopy on 7/22/22 for heavy irregular menses, no endometrial hyperplasia or carcinoma identified  At that time she had a Mirena IUD placed  History of having an negative endometrial biopsy and negative colposcopy for AUB and postcoital bleeding   She has the following concerns:  Get intermittent bleeding and cramping with Mirena iUD  She denies any pain with sex  Denies vaginal dsch or itching  Reviewed irregular bleeding pattern with mirena    Health Maintenance:    She wears her seatbelt routinely      She does perform regular monthly self breast exams  She feels safe at home  Past Medical History:   Diagnosis Date    Anemia     Last assessed 4/10/2015    Class 1 obesity due to excess calories without serious comorbidity with body mass index (BMI) of 33 0 to 33 9 in adult 2014    Encounter for annual routine gynecological examination 2019    Heart murmur     Hemorrhoids     Irregular menses 2016    Menorrhagia with regular cycle 2018    Migraine     Obesity     Last assessed 2013    Possible exposure to STD 2022    Screen for STD (sexually transmitted disease) 2019    Seasonal allergies        Past Surgical History:   Procedure Laterality Date    ABDOMINOPLASTY      NV HEMORRHOIDECTOMY,INT/EXT, 2+ COLUMNS/GROUPS N/A 3/10/2017    Procedure: HEMORRHOIDECTOMY ;  Surgeon: Deion Andrade MD;  Location[facility];  Service: Colorectal    NV HYSTEROSCOPY,W/ENDO BX N/A 2022    Procedure: DILATATION AND CURETTAGE (D&C) WITH HYSTEROSCOPY;  Surgeon: Pernell Hodge MD;  Location[facility];  Service: Gynecology    NV INSERT INTRAUTERINE DEVICE N/A 2022    Procedure: INSERTION OF INTRAUTERINE DEVICE (IUD); Surgeon: Pernell Hodge MD;  [facility];  Service: Gynecology    TUBAL LIGATION         Past OB/Gyn History:  OB History        4    Para   4    Term   4            AB        Living   4       SAB        IAB        Ectopic        Multiple        Live Births   4               Pt has menstrual issues  Per HPI irregular with Mirena IUD   History of sexually transmitted infection: No   History of abnormal pap smears: No      Patient is currently sexually active  heterosexual   The current method of family planning is tubal ligation and IUD      Family History   Problem Relation Age of Onset    Anemia Mother     Hypertension Mother     Diabetes Mother     Down syndrome Brother     Diabetes Maternal Grandmother     Alzheimer's disease Paternal Grandmother     No Known Problems Father     Thyroid disease Sister     No Known Problems Daughter     No Known Problems Son     No Known Problems Sister     No Known Problems Son     No Known Problems Daughter     Breast cancer Maternal Aunt        Social History:  Social History     Socioeconomic History    Marital status: Single     Spouse name: Not on file    Number of children: 3    Years of education: Not on file    Highest education level: Not on file   Occupational History     Employer: [employer]   Tobacco Use    Smoking status: Never Smoker    Smokeless tobacco: Never Used   Vaping Use    Vaping Use: Never used   Substance and Sexual Activity    Alcohol use: Not Currently     Comment: socially  twice monthly    Drug use: Not Currently    Sexual activity: Yes     Partners: Male     Birth control/protection: I U D  Other Topics Concern    Not on file   Social History Narrative    Exercises regularly     Social Determinants of Health     Financial Resource Strain: Low Risk     Difficulty of Paying Living Expenses: Not hard at all   Food Insecurity: No Food Insecurity    Worried About Running Out of Food in the Last Year: Never true    Ralph of Food in the Last Year: Never true   Transportation Needs: No Transportation Needs    Lack of Transportation (Medical): No    Lack of Transportation (Non-Medical): No   Physical Activity: Not on file   Stress: No Stress Concern Present    Feeling of Stress : Not at all   Social Connections: Not on file   Intimate Partner Violence: Not At Risk    Fear of Current or Ex-Partner: No    Emotionally Abused: No    Physically Abused: No    Sexually Abused: No   Housing Stability: Low Risk     Unable to Pay for Housing in the Last Year: No    Number of Places Lived in the Last Year: 1    Unstable Housing in the Last Year: No     Presently lives with family  Patient is single    Patient is currently employed     Allergies   Allergen Reactions  Pollen Extract          Current Outpatient Medications:     Levonorgestrel (Mirena, 52 MG,) 20 MCG/DAY IUD, 1 Intra Uterine Device by Intrauterine route once for 1 dose, Disp: 1 Intra Uterine Device, Rfl: 0      Review of Systems  Constitutional :no fever, feels well, no tiredness, no recent weight gain or loss  ENT: no ear ache, no loss of hearing, no nosebleeds or nasal discharge, no sore throat or hoarseness  Cardiovascular: no complaints of slow or fast heart beat, no chest pain, no palpitations, no leg claudication or lower extremity edema  Respiratory: no complaints of shortness of shortness of breath, no HARLEY  Breasts:no complaints of breast pain, breast lump, or nipple discharge  Gastrointestinal: no complaints of abdominal pain, constipation, nausea, vomiting, or diarrhea or bloody stools  Genitourinary : no complaints of dysuria, incontinence, pelvic pain, no dysmenorrhea, vaginal discharge or abnormal vaginal bleeding and as noted in HPI  Musculoskeletal: no complaints of arthralgia, no myalgia, no joint swelling or stiffness, no limb pain or swelling    Integumentary: no complaints of skin rash or lesion, itching or dry skin  Neurological: no complaints of headache, no confusion, no numbness or tingling, no dizziness or fainting    Objective      /84 (BP Location: Right arm, Patient Position: Sitting, Cuff Size: Adult)   Pulse 70   Ht 5' 4" (1 626 m)   Wt 81 2 kg (179 lb)   LMP 08/14/2022   BMI 30 73 kg/m²   General:   appears stated age, cooperative, alert normal mood and affect   Neck: normal, supple,trachea midline, no masses   Heart: regular rate and rhythm, S1, S2 normal, no murmur, click, rub or gallop   Lungs: clear to auscultation bilaterally   Breasts: normal appearance, no masses or tenderness, Inspection negative, No nipple retraction or dimpling, No nipple discharge or bleeding, No axillary or supraclavicular adenopathy, Normal to palpation without dominant masses, Taught monthly breast self examination   Abdomen: soft, non-tender, without masses or organomegaly   Vulva: normal female genitalia, Bartholin's, Urethra, Pierron normal   Vagina: normal vagina and small amount blood,    Urethra: normal   Cervix: IUD strings present  Nontender  Strings 2-3 cm in length   Uterus: normal size, contour, position, consistency, mobility, non-tender and anteverted, mild tenderness with pelvic  Adnexa: normal adnexa and no mass, fullness, tenderness   Lymphatic palpation of lymph nodes in neck, axilla, groin and/or other locations: no lymphadenopathy or masses noted   Skin normal skin turgor and no rashes     Psychiatric orientation to person, place, and time: normal  mood and affect: normal

## 2022-08-17 ENCOUNTER — ANNUAL EXAM (OUTPATIENT)
Dept: OBGYN CLINIC | Facility: CLINIC | Age: 32
End: 2022-08-17

## 2022-08-17 VITALS
WEIGHT: 179 LBS | HEART RATE: 70 BPM | SYSTOLIC BLOOD PRESSURE: 125 MMHG | BODY MASS INDEX: 30.56 KG/M2 | DIASTOLIC BLOOD PRESSURE: 84 MMHG | HEIGHT: 64 IN

## 2022-08-17 DIAGNOSIS — Z01.419 ENCOUNTER FOR GYNECOLOGICAL EXAMINATION WITHOUT ABNORMAL FINDING: Primary | ICD-10-CM

## 2022-08-17 DIAGNOSIS — Z23 NEED FOR HPV VACCINATION: ICD-10-CM

## 2022-08-17 DIAGNOSIS — Z97.5 IUD (INTRAUTERINE DEVICE) IN PLACE: ICD-10-CM

## 2022-08-17 PROCEDURE — 90651 9VHPV VACCINE 2/3 DOSE IM: CPT

## 2022-08-17 PROCEDURE — 99395 PREV VISIT EST AGE 18-39: CPT | Performed by: NURSE PRACTITIONER

## 2022-08-17 PROCEDURE — 90471 IMMUNIZATION ADMIN: CPT

## 2022-11-02 ENCOUNTER — OFFICE VISIT (OUTPATIENT)
Dept: OBGYN CLINIC | Facility: CLINIC | Age: 32
End: 2022-11-02

## 2022-11-02 VITALS
BODY MASS INDEX: 30.56 KG/M2 | HEART RATE: 92 BPM | SYSTOLIC BLOOD PRESSURE: 115 MMHG | HEIGHT: 64 IN | WEIGHT: 179 LBS | DIASTOLIC BLOOD PRESSURE: 84 MMHG

## 2022-11-02 DIAGNOSIS — R45.86 MOOD SWINGS: ICD-10-CM

## 2022-11-02 DIAGNOSIS — N92.1 BREAKTHROUGH BLEEDING ASSOCIATED WITH INTRAUTERINE DEVICE (IUD): Primary | ICD-10-CM

## 2022-11-02 DIAGNOSIS — Z97.5 BREAKTHROUGH BLEEDING ASSOCIATED WITH INTRAUTERINE DEVICE (IUD): Primary | ICD-10-CM

## 2022-11-02 DIAGNOSIS — N92.1 MENORRHAGIA WITH IRREGULAR CYCLE: ICD-10-CM

## 2022-11-02 RX ORDER — BUPROPION HYDROCHLORIDE 300 MG/1
300 TABLET ORAL EVERY MORNING
COMMUNITY
Start: 2022-09-23

## 2022-11-02 RX ORDER — TOPIRAMATE 25 MG/1
25 TABLET ORAL 2 TIMES DAILY
COMMUNITY
Start: 2022-09-23

## 2022-11-02 NOTE — PROGRESS NOTES
PROBLEM GYNECOLOGICAL VISIT    Umm Joseph is a 34 y o  female who presents today discuss Mirena removal   Her general medical history has been reviewed and she reports it as follows:    Past Medical History:   Diagnosis Date   • Anemia     Last assessed 4/10/2015   • Class 1 obesity due to excess calories without serious comorbidity with body mass index (BMI) of 33 0 to 33 9 in adult 2014   • Encounter for annual routine gynecological examination 2019   • Heart murmur    • Hemorrhoids    • Irregular menses 2016   • Menorrhagia with regular cycle 2018   • Migraine    • Obesity     Last assessed 2013   • Possible exposure to STD 2022   • Screen for STD (sexually transmitted disease) 2019   • Seasonal allergies      Past Surgical History:   Procedure Laterality Date   • ABDOMINOPLASTY     • GA HEMORRHOIDECTOMY,INT/EXT, 2+ COLUMNS/GROUPS N/A 3/10/2017    Procedure: HEMORRHOIDECTOMY ;  Surgeon: Emory Hodges MD;  [facility];  Service: Colorectal   • GA HYSTEROSCOPY,W/ENDO BX N/A 2022    Procedure: DILATATION AND CURETTAGE (D&C) WITH HYSTEROSCOPY;  Surgeon: Beth Lopez MD;  [facility];  Service: Gynecology   • GA INSERT INTRAUTERINE DEVICE N/A 2022    Procedure: INSERTION OF INTRAUTERINE DEVICE (IUD);   Surgeon: Beth Lopez MD;  [facility];  Service: Gynecology   • TUBAL LIGATION       OB History        4    Para   4    Term   4            AB        Living   4       SAB        IAB        Ectopic        Multiple        Live Births   4               Social History     Tobacco Use   • Smoking status: Never Smoker   • Smokeless tobacco: Never Used   Vaping Use   • Vaping Use: Never used   Substance Use Topics   • Alcohol use: Not Currently     Comment: socially  twice monthly   • Drug use: Not Currently     Social History     Substance and Sexual Activity   Sexual Activity Yes   • Partners: Male   • Birth control/protection: I U D , Female Sterilization       Current Outpatient Medications   Medication Instructions   • Levonorgestrel (Mirena, 52 MG,) 20 MCG/DAY IUD 1 Intra Uterine Device, Intrauterine, Once       History of Present Illness:   Cole Blanco presents today to discuss Mirena IUD removal  She had D&C hysteroscopy with Mirena IUD placement on 7/22/22 due to menorrhagia with irregular cycle  She had a normal pelvic US and benign EMB prior  She reports that since Mirena insertion she has been unable to control her emotions and she has continued to have midcycle bleeding  She reports that she had long term success with regulating menses with Sprintec, pharmacy changed OCPs to Ivana earlier this year which is when she started to have breakthrough bleeding, heavier menses and mid cycle spotting  Review of Systems:  Review of Systems   Constitutional: Negative  Gastrointestinal: Negative  Genitourinary: Negative  Physical Exam:  There were no vitals taken for this visit  Physical Exam  Constitutional:       General: She is not in acute distress  Appearance: Normal appearance  Neurological:      Mental Status: She is alert  Skin:     General: Skin is warm and dry  Psychiatric:         Mood and Affect: Mood normal          Behavior: Behavior normal    Vitals reviewed  Assessment:   1  Breakthrough bleeding with IUD    2  Mood swings     Plan:   1  Discussed alternate treatments for irregular menses  She would [facility] to return to [facility], felt that she had normal moods and normal cycles for years with Sprintec  2  Discussed IUD removal procedure  3  Rx sent to pharmacy for [address] [place] [address], dispens as written  Patient will try to  this OCP to see if it is covered  If not, will change rx  4  Will return for IUD removal visit     Reviewed with patient that [facility], but that they will not necessarily be reviewed by me immediately  Explained that I will review results at my earliest opportunity and contact patient appropriately

## 2022-11-10 ENCOUNTER — PROCEDURE VISIT (OUTPATIENT)
Dept: OBGYN CLINIC | Facility: CLINIC | Age: 32
End: 2022-11-10

## 2022-11-10 VITALS
HEART RATE: 79 BPM | SYSTOLIC BLOOD PRESSURE: 121 MMHG | DIASTOLIC BLOOD PRESSURE: 80 MMHG | RESPIRATION RATE: 18 BRPM | WEIGHT: 176.81 LBS | HEIGHT: 64 IN | BODY MASS INDEX: 30.19 KG/M2

## 2022-11-10 DIAGNOSIS — Z30.432 ENCOUNTER FOR IUD REMOVAL: Primary | ICD-10-CM

## 2022-11-10 DIAGNOSIS — N92.1 MENORRHAGIA WITH IRREGULAR CYCLE: ICD-10-CM

## 2022-11-10 PROBLEM — Z97.5 IUD (INTRAUTERINE DEVICE) IN PLACE: Status: RESOLVED | Noted: 2022-08-17 | Resolved: 2022-11-10

## 2022-11-10 NOTE — PROGRESS NOTES
Iud removal    Date/Time: 11/10/2022 9:20 AM  Performed by: APURVA Hester  Authorized by: APURVA Hester   Universal Protocol:  Consent: Verbal consent obtained  Written consent obtained    Risks and benefits: risks, benefits and alternatives were discussed (risk of bleeding, pain, difficult removal)  Consent given by: patient  Patient understanding: patient states understanding of the procedure being performed  Patient consent: the patient's understanding of the procedure matches consent given  Procedure consent: procedure consent matches procedure scheduled  Patient identity confirmed: verbally with patient      Procedure:     Removed with no complications: yes      Removal due to mechanical complications of IUD: no      Removal due to infection and inflammatory reaction: no      Other reason for removal:  Pt desires due to pain,  pt reports weight gain, moodiness  Comments:      Removed easily, pt tolerated well  Will begin [address] [place] [address] 8/2023 for annual

## 2022-11-18 ENCOUNTER — OFFICE VISIT (OUTPATIENT)
Dept: OBGYN CLINIC | Facility: CLINIC | Age: 32
End: 2022-11-18

## 2022-11-18 VITALS
BODY MASS INDEX: 30.05 KG/M2 | WEIGHT: 176 LBS | HEART RATE: 97 BPM | HEIGHT: 64 IN | SYSTOLIC BLOOD PRESSURE: 130 MMHG | DIASTOLIC BLOOD PRESSURE: 89 MMHG

## 2022-11-18 DIAGNOSIS — N89.8 VAGINAL ODOR: ICD-10-CM

## 2022-11-18 DIAGNOSIS — Z20.2 POSSIBLE EXPOSURE TO STD: ICD-10-CM

## 2022-11-18 DIAGNOSIS — B96.89 BACTERIAL VAGINOSIS: Primary | ICD-10-CM

## 2022-11-18 DIAGNOSIS — N76.0 BACTERIAL VAGINOSIS: Primary | ICD-10-CM

## 2022-11-18 LAB
BV WHIFF TEST VAG QL: POSITIVE
CLUE CELLS SPEC QL WET PREP: POSITIVE
PH SMN: 5 [PH]
SL AMB POCT WET MOUNT: POSITIVE
T VAGINALIS VAG QL WET PREP: NEGATIVE
YEAST VAG QL WET PREP: NEGATIVE

## 2022-11-18 RX ORDER — METRONIDAZOLE 500 MG/1
500 TABLET ORAL EVERY 12 HOURS SCHEDULED
Qty: 14 TABLET | Refills: 0 | Status: SHIPPED | OUTPATIENT
Start: 2022-11-18 | End: 2022-11-25

## 2022-11-18 NOTE — PROGRESS NOTES
Assessment/Plan:     Diagnoses and all orders for this visit:    Bacterial vaginosis  -     metroNIDAZOLE (FLAGYL) 500 mg tablet; Take 1 tablet (500 mg total) by mouth every 12 (twelve) hours for 7 days    Vaginal odor  -     metroNIDAZOLE (FLAGYL) 500 mg tablet; Take 1 tablet (500 mg total) by mouth every 12 (twelve) hours for 7 days  -     POCT wet mount    Possible exposure to STD  -     Chlamydia/GC amplified DNA by PCR        GC/CT swab sent  Wet mount positive for clue cells, whiff positive, and elevated pH  7 day course of metronidazole 500 mg BID ordered  Patient counseled to avoid alcohol while on medication  Subjective:      Patient ID: Darius Duran is a 34 y o  female  Patient presents for persistent vaginal odor which she states began about 3 months ago  She notes that she notices the odor before/after her period  She recently had her IUD removed and had heavy bleeding for 3-4 days before resolution  Yesterday and today she noticed the odor again  She describes the odor as strong but not fishy  She endorses some clear vaginal discharge previously but none now  She denies vaginal/pelvic pain as well as dysuria, hematuria, dyspareunia, fever, chills, abdominal pain, and any concern for STDs  She notes that she had been douching to stop the odor but stopped last week  Review of Systems   Constitutional: Negative for chills and fever  Gastrointestinal: Negative for abdominal pain  Genitourinary: Negative for dyspareunia, dysuria, hematuria, vaginal bleeding and vaginal discharge  Objective:      /89 (BP Location: Left arm, Patient Position: Sitting, Cuff Size: Adult)   Pulse 97   Ht 5' 4" (1 626 m)   Wt 79 8 kg (176 lb)   BMI 30 21 kg/m²          Physical Exam  Vitals reviewed  Exam conducted with a chaperone present  Genitourinary:     General: Normal vulva  Exam position: Lithotomy position  Pubic Area: No rash  Labia:         Right: No rash  Left: No rash  Vagina: Vaginal discharge present  No bleeding        Cervix: Normal

## 2022-11-18 NOTE — PATIENT INSTRUCTIONS
Bacterial Vaginosis   AMBULATORY CARE:   Bacterial vaginosis  is an infection in the vagina  It may cause vaginitis (irritation and inflammation of the vagina)  The cause is not known  Bacteria normally found in the vagina are imbalanced  Your risk increases if you are sexually active, you use a douche, or you have an intrauterine device (IUD)  Common signs and symptoms of bacterial vaginosis:   White, gray, or yellow vaginal discharge    Vaginal discharge that smells like fish    Itching or burning around the outside of your vagina    Call your doctor or gynecologist if:   Your symptoms come back or do not improve with treatment  You have vaginal bleeding that is not your monthly period  You have questions or concerns about your condition or care  Antibiotics  are given to kill the bacteria  They may be given as a pill or as a cream to put in your vagina  Bacterial vaginosis and pregnancy:  If you have bacterial vaginosis during pregnancy, your baby may be born early or have a low birth weight  Your healthcare provider may recommend testing for bacterial vaginosis before or during your pregnancy  He or she will talk to you about your risk for premature delivery, and make sure you know the benefits and risks of testing  Prevent bacterial vaginosis:   Keep your vaginal area clean and dry  Wear underwear and pantyhose with a cotton crotch  Wipe from front to back after you urinate or have a bowel movement  After you bathe, rinse soap from your vaginal area to decrease your risk for irritation  Do not use products that cause irritation  Always use unscented tampons or sanitary pads  Do not use feminine sprays, powders, or scented tampons  They may cause irritation and increase your risk for vaginosis  Detergents and fabric softeners may also cause irritation  Do not use a douche  This can cause an imbalance in healthy vaginal bacteria  Use latex condoms during sex    This helps prevent another infection and keeps your partner from getting the infection  Follow up with your doctor or gynecologist as directed: Bacterial vaginosis increases the risk for several health problems, such as pelvic inflammatory disease (PID) or sexually transmitted infections  Work with your healthcare providers to schedule regular appointments to check for health problems  Write down your questions so you remember to ask them during your visits  © Copyright [employer] 2022 Information is for End User's use only and may not be sold, redistributed or otherwise used for commercial purposes  All illustrations and images included in CareNotes® are the copyrighted property of [employer]  or [facility] Naida Metcalf   The above information is an  only  It is not intended as medical advice for individual conditions or treatments  Talk to your doctor, nurse or pharmacist before following any medical regimen to see if it is safe and effective for you

## 2022-11-19 LAB
C TRACH DNA SPEC QL NAA+PROBE: NEGATIVE
N GONORRHOEA DNA SPEC QL NAA+PROBE: NEGATIVE

## 2023-01-12 ENCOUNTER — OFFICE VISIT (OUTPATIENT)
Dept: DENTISTRY | Facility: CLINIC | Age: 33
End: 2023-01-12

## 2023-01-12 DIAGNOSIS — Z01.21 ENCOUNTER FOR DENTAL EXAMINATION AND CLEANING WITH ABNORMAL FINDINGS: Primary | ICD-10-CM

## 2023-01-12 NOTE — PROGRESS NOTES
Comprehensive Exam    Stephanie Desouza presents for a comprehensive exam  Verbal consent for treatment given in addition to the forms  MED HX RVD: noted ASA I  Pain level: 5 (in reln to LR side)    O/E:  Radiographs: FMX    Tr plan:  1) Perio:   Pt has mild gingivitis, mild plaque gen, adv prophy  2) Caries Assessment: Severe  Caries control:   Need fills # 2(OL),3(OL), 31(OB), 19(B), 20(O)  Pt has pain in reln to #30, existing (OB) amalgam, Buccal amalgam fill fractured,  tender on percussion, cold test (+++),  lingering pain (3 -5sec), PA shows deep fill close to pulp chamber, no pap seen  Dx: Reversible pulpitis with symp  apical period  Adv: rct, core, crown  3) Occlusal evaluation:   Pt has Cl I canine reln  4) pt adv extrn supraerupted #14, and 1, 16 with OS      Attempted gluma application on (B) , gave pt  Mild relief  Oral Hygiene instruction reviewed and given  Hygiene recall visits recommended to the patient  pt  Requests apptmts as soon as possible, adv endo referrals for #30, pt  States will call and see who takes her ins  Pt  Adv to return if symptoms persist   Pt  States she was better as she left the off will return for prophy, fills    Nv: Prophy, fills UR side  PT TO  OS REFERRAL AT NEXT APPT

## 2023-01-13 ENCOUNTER — OFFICE VISIT (OUTPATIENT)
Dept: DENTISTRY | Facility: CLINIC | Age: 33
End: 2023-01-13

## 2023-01-13 VITALS — DIASTOLIC BLOOD PRESSURE: 82 MMHG | HEART RATE: 85 BPM | SYSTOLIC BLOOD PRESSURE: 122 MMHG | TEMPERATURE: 98 F

## 2023-01-13 DIAGNOSIS — K03.6 DENTAL CALCULUS: Primary | ICD-10-CM

## 2023-01-13 DIAGNOSIS — Z01.21 ENCOUNTER FOR DENTAL EXAMINATION AND CLEANING WITH ABNORMAL FINDINGS: ICD-10-CM

## 2023-01-13 DIAGNOSIS — K03.6 ACCRETIONS ON TEETH: ICD-10-CM

## 2023-01-13 NOTE — PROGRESS NOTES
Prophy    Dental procedures in this visit   •  - PROPHYLAXIS - ADULT (Completed)     Service provider: Oscar Alvarado [facility], [address] [place] [address]     Billing provider: Salinas Douglas DDS     Asa 2  Cc  4 YEARS SINCE LAST PROPHY   SENS   # 30 ( tx FOR rct)  PATIENT WAS SEEN YESTERDAY FOR COMP AND FMX AND FMP    Method Used:  · Prophy Method Used: Hand Scaling  · Polished  · Flossed    Radiographs Taken:  · None    Intra/Extra Oral Cancer Screening:  · Within normal limits    Oral Hygiene:  · Good    Plaque:  · Generalized  · Light    Calculus:  · Light  · Moderate JUST UNDER MARGIN OF GINGIVA    Bleeding:  · Bleeding on probing: No periodontal exam for this visit  · Light    Stain:  · None    Periodontal Charting:  · Spot probing    Periodontal Classification:  · Mild    Periodontitis Staging/Grading:  · Stagin  · Grading:  A    REFERRED TO OS  FOR EXTR # 1,15,16  NEEDS JONATHAN # 2,3, 31  ENDO # 30   # 19 b # 20 O    NV   JONATHAN # 2,3 75 MIN  NV 2  CONTINUE WITH JONATHAN   RCT # 30 AFTER PRE AUTH   DISCUSSED SFS    6 MOS RECALL  ·       Orders Placed This Encounter   Procedures   • Ambulatory referral to Oral Maxillofacial Surgery     Standing Status:   Future     Standing Expiration Date:   2024     Referral Priority:   Routine     Referral Type:   Consult - AMB     Referral Reason:   Specialty Services Required     Referred to Provider:   Generic External Data Provider     Number of Visits Requested:   1     Expiration Date:   2024

## 2023-07-31 ENCOUNTER — TELEPHONE (OUTPATIENT)
Dept: OBGYN CLINIC | Facility: CLINIC | Age: 33
End: 2023-07-31

## 2023-07-31 DIAGNOSIS — N92.1 MENORRHAGIA WITH IRREGULAR CYCLE: ICD-10-CM

## 2023-07-31 DIAGNOSIS — Z76.0 MEDICATION REFILL: Primary | ICD-10-CM

## 2023-08-29 ENCOUNTER — ANNUAL EXAM (OUTPATIENT)
Dept: OBGYN CLINIC | Facility: CLINIC | Age: 33
End: 2023-08-29

## 2023-08-29 VITALS
BODY MASS INDEX: 31.92 KG/M2 | WEIGHT: 187 LBS | SYSTOLIC BLOOD PRESSURE: 136 MMHG | HEART RATE: 84 BPM | RESPIRATION RATE: 18 BRPM | HEIGHT: 64 IN | DIASTOLIC BLOOD PRESSURE: 92 MMHG

## 2023-08-29 DIAGNOSIS — N76.0 BACTERIAL VAGINOSIS: ICD-10-CM

## 2023-08-29 DIAGNOSIS — Z76.0 MEDICATION REFILL: ICD-10-CM

## 2023-08-29 DIAGNOSIS — N92.1 MENORRHAGIA WITH IRREGULAR CYCLE: ICD-10-CM

## 2023-08-29 DIAGNOSIS — Z30.41 ENCOUNTER FOR SURVEILLANCE OF CONTRACEPTIVE PILLS: ICD-10-CM

## 2023-08-29 DIAGNOSIS — B96.89 BACTERIAL VAGINOSIS: ICD-10-CM

## 2023-08-29 DIAGNOSIS — Z01.411 ENCOUNTER FOR GYNECOLOGICAL EXAMINATION WITH ABNORMAL FINDING: Primary | ICD-10-CM

## 2023-08-29 PROBLEM — N94.10 DYSPAREUNIA IN FEMALE: Status: RESOLVED | Noted: 2022-04-06 | Resolved: 2023-08-29

## 2023-08-29 PROBLEM — N93.0 POSTCOITAL BLEEDING: Status: RESOLVED | Noted: 2022-04-20 | Resolved: 2023-08-29

## 2023-08-29 PROBLEM — N92.3 INTERMENSTRUAL BLEEDING: Status: RESOLVED | Noted: 2022-05-19 | Resolved: 2023-08-29

## 2023-08-29 PROBLEM — Z30.432 ENCOUNTER FOR IUD REMOVAL: Status: RESOLVED | Noted: 2022-11-10 | Resolved: 2023-08-29

## 2023-08-29 LAB
BV WHIFF TEST VAG QL: ABNORMAL
CLUE CELLS SPEC QL WET PREP: ABNORMAL
PH SMN: 5 [PH]
SL AMB POCT WET MOUNT: ABNORMAL
T VAGINALIS VAG QL WET PREP: ABNORMAL
YEAST VAG QL WET PREP: ABNORMAL

## 2023-08-29 PROCEDURE — 87491 CHLMYD TRACH DNA AMP PROBE: CPT | Performed by: NURSE PRACTITIONER

## 2023-08-29 PROCEDURE — 87591 N.GONORRHOEAE DNA AMP PROB: CPT | Performed by: NURSE PRACTITIONER

## 2023-08-29 RX ORDER — TOPIRAMATE 50 MG/1
TABLET, FILM COATED ORAL
COMMUNITY
Start: 2023-07-25

## 2023-08-29 RX ORDER — METRONIDAZOLE 500 MG/1
500 TABLET ORAL EVERY 12 HOURS SCHEDULED
Qty: 14 TABLET | Refills: 0 | Status: SHIPPED | OUTPATIENT
Start: 2023-08-29 | End: 2023-09-05

## 2023-08-29 NOTE — PROGRESS NOTES
ASSESSMENT & PLAN: Belinda Kaminski is a 28 y.o. [id number] with abnormal gynecologic exam.  She has bacterial vaginosis    1. Routine well woman exam done today  2. Pap and HPV:  The patient's last pap and hpv was 7/25/2019. It was normal.    Pap and cotesting was not done today. Current ASCCP Guidelines reviewed. 3.  The following were reviewed in today's visit: breast self exam, STD testing, adequate intake of calcium and vitamin D, exercise and healthy diet. 4. Gardisil vaccine in women up to age 39 discussed and information was provided. Has had vaccines   5. OCPs, prescription renewed for 1 year, reviewed to monitor symptoms and call with any concerns. 6.  Vaginosis-metronidazole 500 mg 1 pill twice daily x7 days, reviewed no alcohol with use. Reviewed prevention and information provided    Depression Screening Follow-up Plan: Patient's depression screening was negative with a PHQ-2 score of 0 . Their PHQ-9 score was  0. Clinically patient does not have depression. No treatment is required. BMI Counseling: Body mass index is 32.1 kg/m². The BMI is above normal. Nutrition recommendations include 3-5 servings of fruits/vegetables daily, moderation in carbohydrate intake and reducing intake of cholesterol. Exercise recommendations include exercising 3-5 times per week. CC:  Annual Gynecologic Examination    HPI: Belinda Kaminski is a 28 y.o. [id number] who presents for annual gynecologic examination. Has history of tubal and  she underwent a D&C and hysteroscopy on 7/22/2022 for heavy menses that were irregular, no endometrial hyperplasia or carcinoma was identified. She had Mirena IUD placed at that time but then had it removed due to pain, weight gain and moodiness and is currently on OCPs. She is on Sprintec, her menses are regular last about 4 days moderate flow gets mild cramping. She desires to continue use. She has the following concerns:  Thinks she may have BV, has a fishy odor and some discharge. She reports she usually gets after her menses. We discussed causes of BV and may be related to her products that she is using. Reviewed to use unscented products. Health Maintenance:    She wears her seatbelt routinely. She does perform regular monthly self breast exams. She feels safe at home. Past Medical History:   Diagnosis Date   • Anemia     Last assessed 4/10/2015   • Class 1 obesity due to excess calories without serious comorbidity with body mass index (BMI) of 33.0 to 33.9 in adult 6/18/2014   • Dyspareunia in female 4/6/2022   • Encounter for annual routine gynecological examination 7/25/2019   • Encounter for IUD removal 11/10/2022    Removed per pt request 11/10/22   • Heart murmur    • Hemorrhoids    • Intermenstrual bleeding 5/19/2022   • Irregular menses 11/1/2016   • IUD (intrauterine device) in place 8/17/2022    Placed 7/22/2022 for heavy, irregular menses. Has tubal.    • Menorrhagia with irregular cycle 4/6/2022   • Menorrhagia with regular cycle 6/1/2018   • Migraine    • Obesity     Last assessed 12/30/2013   • Possible exposure to STD 4/6/2022   • Postcoital bleeding 4/20/2022   • Screen for STD (sexually transmitted disease) 7/25/2019   • Seasonal allergies        Past Surgical History:   Procedure Laterality Date   • ABDOMINOPLASTY     • MS HEMORRHOIDECTOMY INT & XTRNL 2/> COLUMN/SKYLAR N/A 3/10/2017    Procedure: HEMORRHOIDECTOMY ;  Surgeon: Armida Victor MD;  [facility];  Service: Colorectal   • MS HYSTEROSCOPY BX ENDOMETRIUM&/POLYPC W/WO D&C N/A 7/22/2022    Procedure: DILATATION AND CURETTAGE (D&C) WITH HYSTEROSCOPY;  Surgeon: Arti Mcfadden MD;  [facility];  Service: Gynecology   • MS INSERTION INTRAUTERINE DEVICE IUD N/A 7/22/2022    Procedure: INSERTION OF INTRAUTERINE DEVICE (IUD);   Surgeon: Arti Mcfadden MD;  [facility];  Service: Gynecology   • TUBAL LIGATION         Past OB/Gyn History:  OB History    4    Para   4    Term   4            AB        Living   4       SAB        IAB        Ectopic        Multiple        Live Births   4               Pt does not have menstrual issues. History of sexually transmitted infection: No.  History of abnormal pap smears: No .    Patient is currently sexually active. heterosexual.  The current method of family planning is tubal ligation.     Family History   Problem Relation Age of Onset   • Anemia Mother    • Hypertension Mother    • Diabetes Mother    • No Known Problems Father    • Thyroid disease Sister    • No Known Problems Sister    • Down syndrome Brother    • No Known Problems Daughter    • No Known Problems Daughter    • No Known Problems Son    • No Known Problems Son    • Diabetes Maternal Grandmother    • Alzheimer's disease Paternal Grandmother    • Breast cancer Maternal Aunt    • Colon cancer Neg Hx    • Ovarian cancer Neg Hx        Social History:  Social History     Socioeconomic History   • Marital status: Single     Spouse name: Not on file   • Number of children: 4   • Years of education: Not on file   • Highest education level: Not on file   Occupational History     Employer: [email or web address]   Tobacco Use   • Smoking status: Never   • Smokeless tobacco: Never   Vaping Use   • Vaping Use: Never used   Substance and Sexual Activity   • Alcohol use: Not Currently     Comment: socially  twice monthly   • Drug use: Not Currently   • Sexual activity: Yes     Partners: Male     Birth control/protection: Female Sterilization   Other Topics Concern   • Not on file   Social History Narrative    Exercises regularly     Social Determinants of Health     Financial Resource Strain: Low Risk  (2022)    Overall Financial Resource Strain (CARDIA)    • Difficulty of Paying Living Expenses: Not hard at all   Food Insecurity: No Food Insecurity (2022)    Hunger Vital Sign    • Worried About Running Out of Food in the Last Year: Never true    • Ran Out of Food in the Last Year: Never true   Transportation Needs: No Transportation Needs (8/17/2022)    PRAPARE - Transportation    • Lack of Transportation (Medical): No    • Lack of Transportation (Non-Medical): No   Physical Activity: Insufficiently Active (7/28/2020)    Exercise Vital Sign    • Days of Exercise per Week: 3 days    • Minutes of Exercise per Session: 40 min   Stress: No Stress Concern Present (8/17/2022)    [facility]    • Feeling of Stress : Not at all   Social Connections: Socially Isolated (7/28/2020)    Social Connection and Isolation Panel [NHANES]    • Frequency of Communication with Friends and Family: More than three times a week    • Frequency of Social Gatherings with Friends and Family: More than three times a week    • Attends Mosque Services: Never    • Active Member of Clubs or Organizations: No    • Attends Club or Organization Meetings: Never    • Marital Status:    Intimate Partner Violence: Not At Risk (8/17/2022)    Humiliation, Afraid, Rape, and Kick questionnaire    • Fear of Current or Ex-Partner: No    • Emotionally Abused: No    • Physically Abused: No    • Sexually Abused: No   Housing Stability: Low Risk  (8/17/2022)    Housing Stability Vital Sign    • Unable to Pay for Housing in the Last Year: No    • Number of Places Lived in the Last Year: 1    • Unstable Housing in the Last Year: No     Presently lives with family. Patient is single.   Patient is currently employed she works at [employer]    Allergies   Allergen Reactions   • Pollen Extract          Current Outpatient Medications:   •  buPROPion (WELLBUTRIN XL) 300 mg 24 hr tablet, Take 300 mg by mouth every morning, Disp: , Rfl:   •  metroNIDAZOLE (FLAGYL) 500 mg tablet, Take 1 tablet (500 mg total) by mouth every 12 (twelve) hours for 7 days, Disp: 14 tablet, Rfl: 0  •  Sprintec 28 0.25-35 MG-MCG per tablet, Take 1 tablet by mouth daily, Disp: 84 tablet, Rfl: 3  •  topiramate (TOPAMAX) 25 mg tablet, Take 25 mg by mouth 2 (two) times a day (Patient not taking: Reported on 11/2/2022), Disp: , Rfl:   •  topiramate (TOPAMAX) 50 MG tablet, 1 TABLET TWICE A DAY (Patient not taking: Reported on 8/29/2023), Disp: , Rfl:       Review of Systems  Constitutional :no fever, feels well, no tiredness, no recent weight gain or loss  ENT: no ear ache, no loss of hearing, no nosebleeds or nasal discharge, no sore throat or hoarseness. Cardiovascular: no complaints of slow or fast heart beat, no chest pain, no palpitations, no leg claudication or lower extremity edema. Respiratory: no complaints of shortness of shortness of breath, no HARLEY  Breasts:no complaints of breast pain, breast lump, or nipple discharge  Gastrointestinal: no complaints of abdominal pain, constipation, nausea, vomiting, or diarrhea or bloody stools  Genitourinary : no complaints of dysuria, incontinence, pelvic pain, no dysmenorrhea, vaginal discharge or abnormal vaginal bleeding and as noted in HPI. Musculoskeletal: no complaints of arthralgia, no myalgia, no joint swelling or stiffness, no limb pain or swelling.   Integumentary: no complaints of skin rash or lesion, itching or dry skin  Neurological: no complaints of headache, no confusion, no numbness or tingling, no dizziness or fainting    Objective      /92 (BP Location: Right arm, Patient Position: Sitting, Cuff Size: Adult)   Pulse 84   Resp 18   Ht 5' 4" (1.626 m)   Wt 84.8 kg (187 lb)   LMP 08/09/2023 (Exact Date)   BMI 32.10 kg/m²   General:   appears stated age, cooperative, alert normal mood and affect   Neck: normal, supple,trachea midline, no masses   Heart: regular rate and rhythm, S1, S2 normal, no murmur, click, rub or gallop   Lungs: clear to auscultation bilaterally   Breasts: normal appearance, no masses or tenderness, Inspection negative, No nipple retraction or dimpling, No nipple discharge or bleeding, No axillary or supraclavicular adenopathy, Normal to palpation without dominant masses, Taught monthly breast self examination   Abdomen: soft, non-tender, without masses or organomegaly   Vulva: Bartholin's, Urethra, [place] normal   Vagina: vagina positive for white creamy dsch   Urethra: normal   Cervix: GCC done. Nontender. Uterus: normal size, contour, position, consistency, mobility, non-tender   Adnexa: no mass, fullness, tenderness   Lymphatic palpation of lymph nodes in neck, axilla, groin and/or other locations: no lymphadenopathy or masses noted   Skin normal skin turgor and no rashes.    Psychiatric orientation to person, place, and time: normal. mood and affect: normal

## 2023-08-30 ENCOUNTER — TELEPHONE (OUTPATIENT)
Dept: OBGYN CLINIC | Facility: CLINIC | Age: 33
End: 2023-08-30

## 2023-08-30 LAB
C TRACH DNA SPEC QL NAA+PROBE: NEGATIVE
N GONORRHOEA DNA SPEC QL NAA+PROBE: NEGATIVE

## 2023-08-30 NOTE — TELEPHONE ENCOUNTER
Called pt and informed her of her test results. She verbalized her understanding and had no questions or concerns.

## 2023-08-30 NOTE — TELEPHONE ENCOUNTER
----- Message from Hernando Simmons, [address] [place] [address] sent at 8/30/2023  2:06 PM EDT -----  Please inform pt that her culture for chlamydia and gonorrhea were negative. Thank You!

## 2023-10-18 DIAGNOSIS — B96.89 BACTERIAL VAGINOSIS: Primary | ICD-10-CM

## 2023-10-18 DIAGNOSIS — N76.0 BACTERIAL VAGINOSIS: Primary | ICD-10-CM

## 2023-10-18 RX ORDER — METRONIDAZOLE 500 MG/1
500 TABLET ORAL EVERY 12 HOURS SCHEDULED
Qty: 14 TABLET | Refills: 0 | Status: SHIPPED | OUTPATIENT
Start: 2023-10-18 | End: 2023-10-25

## 2024-02-21 PROBLEM — Z01.419 ENCOUNTER FOR GYNECOLOGICAL EXAMINATION WITHOUT ABNORMAL FINDING: Status: RESOLVED | Noted: 2022-08-17 | Resolved: 2024-02-21

## 2024-08-01 DIAGNOSIS — N92.1 MENORRHAGIA WITH IRREGULAR CYCLE: ICD-10-CM

## 2024-08-01 DIAGNOSIS — Z76.0 MEDICATION REFILL: ICD-10-CM

## 2024-08-01 DIAGNOSIS — Z30.41 ENCOUNTER FOR SURVEILLANCE OF CONTRACEPTIVE PILLS: ICD-10-CM

## 2024-08-26 ENCOUNTER — ANNUAL EXAM (OUTPATIENT)
Dept: OBGYN CLINIC | Facility: CLINIC | Age: 34
End: 2024-08-26

## 2024-08-26 VITALS
DIASTOLIC BLOOD PRESSURE: 89 MMHG | BODY MASS INDEX: 33.8 KG/M2 | HEIGHT: 64 IN | WEIGHT: 198 LBS | SYSTOLIC BLOOD PRESSURE: 134 MMHG | HEART RATE: 86 BPM | RESPIRATION RATE: 18 BRPM

## 2024-08-26 DIAGNOSIS — Z76.0 MEDICATION REFILL: ICD-10-CM

## 2024-08-26 DIAGNOSIS — N76.0 BV (BACTERIAL VAGINOSIS): ICD-10-CM

## 2024-08-26 DIAGNOSIS — Z30.41 ENCOUNTER FOR SURVEILLANCE OF CONTRACEPTIVE PILLS: ICD-10-CM

## 2024-08-26 DIAGNOSIS — N92.1 MENORRHAGIA WITH IRREGULAR CYCLE: ICD-10-CM

## 2024-08-26 DIAGNOSIS — Z12.4 CERVICAL CANCER SCREENING: ICD-10-CM

## 2024-08-26 DIAGNOSIS — B96.89 BV (BACTERIAL VAGINOSIS): ICD-10-CM

## 2024-08-26 DIAGNOSIS — Z01.411 ENCOUNTER FOR GYNECOLOGICAL EXAMINATION (GENERAL) (ROUTINE) WITH ABNORMAL FINDINGS: Primary | ICD-10-CM

## 2024-08-26 PROCEDURE — G0476 HPV COMBO ASSAY CA SCREEN: HCPCS | Performed by: OBSTETRICS & GYNECOLOGY

## 2024-08-26 PROCEDURE — S0612 ANNUAL GYNECOLOGICAL EXAMINA: HCPCS | Performed by: OBSTETRICS & GYNECOLOGY

## 2024-08-26 PROCEDURE — G0145 SCR C/V CYTO,THINLAYER,RESCR: HCPCS | Performed by: PATHOLOGY

## 2024-08-26 PROCEDURE — G0124 SCREEN C/V THIN LAYER BY MD: HCPCS | Performed by: PATHOLOGY

## 2024-08-26 RX ORDER — CLINDAMYCIN PHOSPHATE 20 MG/G
1 CREAM VAGINAL
Qty: 40 G | Refills: 0 | Status: SHIPPED | OUTPATIENT
Start: 2024-08-26 | End: 2024-09-02

## 2024-08-26 RX ORDER — NORGESTIMATE AND ETHINYL ESTRADIOL 0.25-0.035
1 KIT ORAL DAILY
Qty: 84 TABLET | Refills: 3 | Status: SHIPPED | OUTPATIENT
Start: 2024-08-26

## 2024-08-26 NOTE — PROGRESS NOTES
.ASSESSMENT & PLAN: Estella Acosta is a 34 y.o.  with abnormal gynecologic exam.    1.  Routine well woman exam done today  2.  Pap and HPV:  The patient's last pap and hpv was 2019.    It was normal.    Pap and cotesting was done today.    Current ASCCP Guidelines reviewed.   3.  The following were reviewed in today's visit: recurrent BV.  Will try clindamycin vaginal cream.  If not will need long term flagyl.   4. Pt is immunized against HPV.    CC:  Annual Gynecologic Examination    HPI: Estella Acosta is a 34 y.o.  who presents for annual gynecologic examination.  She has the following concerns:  pt reports getting BV after every period.  She has tried OTC probiotics with no improvement.     BMI Counseling: Body mass index is 33.99 kg/m². The BMI is above normal. Nutrition recommendations include decreasing portion sizes. Rationale for BMI follow-up plan is due to patient being overweight or obese.     Depression Screening and Follow-up Plan: Patient was screened for depression during today's encounter. They screened negative with a PHQ-2 score of 2.  Health Maintenance:    She wears her seatbelt routinely.    She does not perform regular monthly self breast exams.    She feels safe at home.     Past Medical History:   Diagnosis Date    Anemia     Last assessed 4/10/2015    Class 1 obesity due to excess calories without serious comorbidity with body mass index (BMI) of 33.0 to 33.9 in adult 2014    Dyspareunia in female 2022    Encounter for annual routine gynecological examination 2019    Encounter for IUD removal 11/10/2022    Removed per pt request 11/10/22    Heart murmur     Hemorrhoids     Intermenstrual bleeding 2022    Irregular menses 2016    IUD (intrauterine device) in place 2022    Placed 2022 for heavy, irregular menses. Has tubal.     Menorrhagia with irregular cycle 2022    Menorrhagia with regular cycle 2018     Migraine     Obesity     Last assessed 2013    Possible exposure to STD 2022    Postcoital bleeding 2022    Screen for STD (sexually transmitted disease) 2019    Seasonal allergies        Past Surgical History:   Procedure Laterality Date    ABDOMINOPLASTY      SC HEMORRHOIDECTOMY INT & XTRNL 2/> COLUMN/SKYLAR N/A 3/10/2017    Procedure: HEMORRHOIDECTOMY ;  Surgeon: Gab Colon MD;  [facility];  Service: Colorectal    SC HYSTEROSCOPY BX ENDOMETRIUM&/POLYPC W/WO D&C N/A 2022    Procedure: DILATATION AND CURETTAGE (D&C) WITH HYSTEROSCOPY;  Surgeon: Christopher Barragan MD;  [facility];  Service: Gynecology    SC INSERTION INTRAUTERINE DEVICE IUD N/A 2022    Procedure: INSERTION OF INTRAUTERINE DEVICE (IUD);  Surgeon: Christopher Barragan MD;  [facility];  Service: Gynecology    TUBAL LIGATION         Past OB/Gyn History:  OB History          4    Para   4    Term   4            AB        Living   4         SAB        IAB        Ectopic        Multiple        Live Births   4               Pt does not have menstrual issues.    History of sexually transmitted infection: No.  History of abnormal pap smears: No .    Patient is currently sexually active.  heterosexual.  The current method of family planning is OCP (estrogen/progesterone) and tubal ligation.    Family History   Problem Relation Age of Onset    Anemia Mother     Hypertension Mother     Diabetes Mother     No Known Problems Father     Thyroid disease Sister     No Known Problems Sister     Down syndrome Brother     No Known Problems Daughter     No Known Problems Daughter     No Known Problems Son     No Known Problems Son     Diabetes Maternal Grandmother     Alzheimer's disease Paternal Grandmother     Breast cancer Maternal Aunt     Colon cancer Neg Hx     Ovarian cancer Neg Hx        Social History:  Social History     Socioeconomic History    Marital status: Single     Spouse name: Not on  file    Number of children: 4    Years of education: Not on file    Highest education level: Not on file   Occupational History     Employer: [employer]   Tobacco Use    Smoking status: Never    Smokeless tobacco: Never   Vaping Use    Vaping status: Never Used   Substance and Sexual Activity    Alcohol use: Not Currently     Comment: socially  twice monthly    Drug use: Not Currently    Sexual activity: Yes     Partners: Male     Birth control/protection: Female Sterilization   Other Topics Concern    Not on file   Social History Narrative    Exercises regularly     Social Determinants of Health     Financial Resource Strain: Low Risk  (8/17/2022)    Overall Financial Resource Strain (CARDIA)     Difficulty of Paying Living Expenses: Not hard at all   Food Insecurity: No Food Insecurity (8/17/2022)    Hunger Vital Sign     Worried About Running Out of Food in the Last Year: Never true     Ran Out of Food in the Last Year: Never true   Transportation Needs: No Transportation Needs (8/17/2022)    PRAPARE - Transportation     Lack of Transportation (Medical): No     Lack of Transportation (Non-Medical): No   Physical Activity: Insufficiently Active (7/28/2020)    Exercise Vital Sign     Days of Exercise per Week: 3 days     Minutes of Exercise per Session: 40 min   Stress: No Stress Concern Present (8/17/2022)    [employer] [place] [employer] - Occupational Stress Questionnaire     Feeling of Stress : Not at all   Social Connections: Socially Isolated (7/28/2020)    Social Connection and Isolation Panel [NHANES]     Frequency of Communication with Friends and Family: More than three times a week     Frequency of Social Gatherings with Friends and Family: More than three times a week     Attends Sabianism Services: Never     Active Member of Clubs or Organizations: No     Attends Club or Organization Meetings: Never     Marital Status:    Intimate Partner Violence: Not At Risk (8/17/2022)     Humiliation, Afraid, Rape, and Kick questionnaire     Fear of Current or Ex-Partner: No     Emotionally Abused: No     Physically Abused: No     Sexually Abused: No   Housing Stability: Low Risk  (8/17/2022)    Housing Stability Vital Sign     Unable to Pay for Housing in the Last Year: No     Number of Places Lived in the Last Year: 1     Unstable Housing in the Last Year: No     Presently lives with her 4 kids and her partner.  Patient is single.  Patient is currently employed     Allergies   Allergen Reactions    Pollen Extract          Current Outpatient Medications:     buPROPion (WELLBUTRIN XL) 300 mg 24 hr tablet, Take 300 mg by mouth every morning, Disp: , Rfl:     norgestimate-ethinyl estradiol (Sprintec 28) 0.25-35 MG-MCG per tablet, TAKE 1 TABLET BY MOUTH EVERY DAY, Disp: 84 tablet, Rfl: 0    topiramate (TOPAMAX) 25 mg tablet, Take 25 mg by mouth 2 (two) times a day (Patient not taking: Reported on 11/2/2022), Disp: , Rfl:     topiramate (TOPAMAX) 50 MG tablet, 1 TABLET TWICE A DAY (Patient not taking: Reported on 8/29/2023), Disp: , Rfl:       Review of Systems  Constitutional :no fever, feels well, no tiredness, no recent weight gain or loss  ENT: no ear ache, no loss of hearing, no nosebleeds or nasal discharge, no sore throat or hoarseness.  Cardiovascular: no complaints of slow or fast heart beat, no chest pain, no palpitations, no leg claudication or lower extremity edema.  Respiratory: no complaints of shortness of shortness of breath, no HARLEY  Breasts:no complaints of breast pain, breast lump, or nipple discharge  Gastrointestinal: no complaints of abdominal pain, constipation, nausea, vomiting, or diarrhea or bloody stools  Genitourinary : no complaints of dysuria, incontinence, pelvic pain, no dysmenorrhea, vaginal discharge or abnormal vaginal bleeding and as noted in HPI.  Musculoskeletal: no complaints of arthralgia, no myalgia, no joint swelling or stiffness, no limb pain or  "swelling.  Integumentary: no complaints of skin rash or lesion, itching or dry skin  Neurological: no complaints of headache, no confusion, no numbness or tingling, no dizziness or fainting    Objective      /89 (BP Location: Right arm, Patient Position: Sitting, Cuff Size: Adult)   Pulse 86   Resp 18   Ht 5' 4\" (1.626 m)   Wt 89.8 kg (198 lb)   LMP 08/22/2024 (Exact Date)   BMI 33.99 kg/m²   General:   appears stated age, cooperative, alert normal mood and affect   Neck: normal, supple,trachea midline, no masses   Heart: regular rate and rhythm, S1, S2 normal, no murmur, click, rub or gallop   Lungs: clear to auscultation bilaterally   Breasts: normal appearance, no masses or tenderness   Abdomen: soft, non-tender, without masses or organomegaly   Vulva: normal   Vagina: normal vagina with discharge, +whiff   Urethra: normal   Cervix: Pap done, slight bleeding on pap   Uterus: retroverted   Adnexa: normal adnexa   Lymphatic palpation of lymph nodes in neck, axilla, groin and/or other locations: no lymphadenopathy or masses noted   Skin normal skin turgor and no rashes.   Psychiatric orientation to person, place, and time: normal. mood and affect: normal      "

## 2024-08-27 LAB
HPV HR 12 DNA CVX QL NAA+PROBE: POSITIVE
HPV16 DNA CVX QL NAA+PROBE: NEGATIVE
HPV18 DNA CVX QL NAA+PROBE: NEGATIVE

## 2024-08-30 ENCOUNTER — TELEPHONE (OUTPATIENT)
Dept: OBGYN CLINIC | Facility: CLINIC | Age: 34
End: 2024-08-30

## 2024-08-30 LAB
LAB AP GYN PRIMARY INTERPRETATION: ABNORMAL
Lab: ABNORMAL
PATH INTERP SPEC-IMP: ABNORMAL

## 2024-08-30 NOTE — TELEPHONE ENCOUNTER
----- Message from Nicky Vega MD sent at 8/30/2024 11:14 AM EDT -----  Please schedule for colpo    TY  ----- Message -----  From: Lab, Background User  Sent: 8/27/2024   8:16 PM EDT  To: Nicky Vega MD

## 2024-09-16 ENCOUNTER — PROCEDURE VISIT (OUTPATIENT)
Dept: OBGYN CLINIC | Facility: CLINIC | Age: 34
End: 2024-09-16

## 2024-09-16 VITALS
HEART RATE: 89 BPM | BODY MASS INDEX: 32.44 KG/M2 | DIASTOLIC BLOOD PRESSURE: 95 MMHG | SYSTOLIC BLOOD PRESSURE: 142 MMHG | WEIGHT: 190 LBS | RESPIRATION RATE: 18 BRPM | HEIGHT: 64 IN

## 2024-09-16 DIAGNOSIS — R87.611 PAP SMEAR OF CERVIX WITH ASCUS, CANNOT EXCLUDE HGSIL: Primary | ICD-10-CM

## 2024-09-16 PROCEDURE — 88305 TISSUE EXAM BY PATHOLOGIST: CPT | Performed by: PATHOLOGY

## 2024-09-16 PROCEDURE — 57454 BX/CURETT OF CERVIX W/SCOPE: CPT | Performed by: OBSTETRICS & GYNECOLOGY

## 2024-09-16 PROCEDURE — 88344 IMHCHEM/IMCYTCHM EA MLT ANTB: CPT | Performed by: PATHOLOGY

## 2024-09-16 NOTE — PROGRESS NOTES
Colposcopy    Date/Time: 9/16/2024 4:00 PM    Performed by: Nicky Vega MD  Authorized by: Nicky Vega MD    Other Assisting Provider: Yes (comment)    Verbal consent obtained?: Yes    Written consent obtained?: Yes    Risks and benefits: Risks, benefits and alternatives were discussed    Consent given by:  Patient  Time Out:     Time out performed at:  9/16/2024 4:10 PM  Patient states understanding of procedure being performed: Yes    Patient's understanding of procedure matches consent: Yes    Procedure consent matches procedure scheduled: Yes    Required items: Required blood products, implants, devices and special equipment available    Patient identity confirmed:  Verbally with patient  Pre-procedure:     Prepped with: acetic acid and Lugol    Indication:     Indication:  ASC-H  Procedure:     Procedure: Colposcopy w/ cervical biopsy and ECC      Under satisfactory analgesia the patient was prepped and draped in the dorsal lithotomy position: yes      Spiro speculum was placed in the vagina: yes      Under colposcopic examination the transition zone was seen in entirety: yes      Endocervix was curetted using a Kevorkian curette: yes      Cervical biopsy performed with a cervical biopsy punch: yes      Monsel's solution was applied: yes      Specimen(s) to pathology: yes    Post-procedure:     Findings: White epithelium      Impression: Low grade cervical dysplasia      Patient tolerance of procedure:  Tolerated well, no immediate complications  Comments:      Biopsy at 7 o clock and 3 o clock sites of cervix.

## 2024-09-20 PROCEDURE — 88344 IMHCHEM/IMCYTCHM EA MLT ANTB: CPT | Performed by: PATHOLOGY

## 2024-09-20 PROCEDURE — 88305 TISSUE EXAM BY PATHOLOGIST: CPT | Performed by: PATHOLOGY

## 2024-09-23 ENCOUNTER — TELEPHONE (OUTPATIENT)
Dept: OBGYN CLINIC | Facility: CLINIC | Age: 34
End: 2024-09-23

## 2024-09-23 NOTE — TELEPHONE ENCOUNTER
Patient walked into the office look for results/recommendations. Patient stated she has issues with her phone. Reviewed recommendations, and made an appointment to meet with MD on 09/26.

## 2024-09-23 NOTE — TELEPHONE ENCOUNTER
----- Message from Nicky Vega MD sent at 9/23/2024 10:06 AM EDT -----  Please make appt for surgery discussion with GYN resident      TY  ----- Message -----  From: Lab, Background User  Sent: 9/20/2024   8:31 AM EDT  To: Nicky Vega MD

## 2024-09-25 NOTE — PROGRESS NOTES
OB/GYN VISIT  Estella Acosta  2024  8:18 AM    ASSESSMENT / PLAN:    Estella Acosta is a 34 y.o.  female presenting for consent for LEEP procedure after colposcopy resulted with RACHEL II. Surgical consent signed. Will reach out to surgical scheduler to be scheduled pending review by surgical committee.       SUBJECTIVE:    Estella Acosta is a 34 y.o.  female presenting for review of colposcopy results and discussion of LEEP procedure. Estella had a pap smear in August that resulted ASCUS HPV other positive. She had a colposcopy in September which revealed RACHEL II at the 3 oclock biopsy. Discussed these results with patient and recommendation for LEEP to rule out high grade dysplasia and cancer as well as to remove abnormal cells. Discussed that this procedure is diagnostic and therapeutic. Discussed risk of bleeding, infection, damage to vagina, bladder, or rectum. Discussed risk of complications in future pregnancies. Patient is s/p tubal ligation and does not desire any future pregnancies. Discussed possible need for future excision. Discussed risks of bleeding, cramping, abnormal discharge after surgery.     Past Medical History:   Diagnosis Date    Anemia     Last assessed 4/10/2015    Class 1 obesity due to excess calories without serious comorbidity with body mass index (BMI) of 33.0 to 33.9 in adult 2014    Dyspareunia in female 2022    Encounter for annual routine gynecological examination 2019    Encounter for IUD removal 11/10/2022    Removed per pt request 11/10/22    Heart murmur     Hemorrhoids     Intermenstrual bleeding 2022    Irregular menses 2016    IUD (intrauterine device) in place 2022    Placed 2022 for heavy, irregular menses. Has tubal.     Menorrhagia with irregular cycle 2022    Menorrhagia with regular cycle 2018    Migraine     Obesity     Last assessed 2013    Possible exposure to STD 2022     "Postcoital bleeding 4/20/2022    Screen for STD (sexually transmitted disease) 7/25/2019    Seasonal allergies        Past Surgical History:   Procedure Laterality Date    ABDOMINOPLASTY      AL HEMORRHOIDECTOMY INT & XTRNL 2/> COLUMN/SKYLAR N/A 3/10/2017    Procedure: HEMORRHOIDECTOMY ;  Surgeon: Gab Colon MD;  [facility];  Service: Colorectal    AL HYSTEROSCOPY BX ENDOMETRIUM&/POLYPC W/WO D&C N/A 7/22/2022    Procedure: DILATATION AND CURETTAGE (D&C) WITH HYSTEROSCOPY;  Surgeon: Christopher Barragan MD;  [facility];  Service: Gynecology    AL INSERTION INTRAUTERINE DEVICE IUD N/A 7/22/2022    Procedure: INSERTION OF INTRAUTERINE DEVICE (IUD);  Surgeon: Christopher Barragan MD;  [facility];  Service: Gynecology    TUBAL LIGATION         OBJECTIVE:    Vitals:  Blood pressure 130/87, pulse 90, resp. rate 18, height 5' 4\" (1.626 m), weight 85.7 kg (189 lb), last menstrual period 09/12/2024, not currently breastfeeding.Body mass index is 32.44 kg/m².    Physical Exam:    Physical Exam  Constitutional:       General: She is not in acute distress.     Appearance: Normal appearance. She is not ill-appearing.   Genitourinary:      Genitourinary Comments: SVE deferred   HENT:      Head: Normocephalic and atraumatic.      Mouth/Throat:      Mouth: Mucous membranes are moist. No oral lesions.   Eyes:      General: No scleral icterus.     Extraocular Movements: Extraocular movements intact.   Cardiovascular:      Rate and Rhythm: Normal rate and regular rhythm.      Pulses: Normal pulses.      Heart sounds: Normal heart sounds.   Pulmonary:      Effort: Pulmonary effort is normal. No respiratory distress.      Breath sounds: Normal breath sounds.   Abdominal:      Palpations: Abdomen is soft.      Tenderness: There is no abdominal tenderness.   Musculoskeletal:      Right lower leg: No edema.      Left lower leg: No edema.   Neurological:      General: No focal deficit present.      Mental Status: She " is alert and oriented to person, place, and time.   Skin:     General: Skin is warm and dry.   Psychiatric:         Attention and Perception: Attention normal.         Mood and Affect: Mood normal.         Speech: Speech normal.         Behavior: Behavior normal.         Thought Content: Thought content normal.         Judgment: Judgment normal.   Vitals and nursing note reviewed. Exam conducted with a chaperone present.             Allyson Sanderson MD  9/26/2024  8:18 AM

## 2024-09-26 ENCOUNTER — OFFICE VISIT (OUTPATIENT)
Dept: OBGYN CLINIC | Facility: CLINIC | Age: 34
End: 2024-09-26

## 2024-09-26 VITALS
WEIGHT: 189 LBS | HEART RATE: 90 BPM | BODY MASS INDEX: 32.27 KG/M2 | SYSTOLIC BLOOD PRESSURE: 130 MMHG | DIASTOLIC BLOOD PRESSURE: 87 MMHG | HEIGHT: 64 IN | RESPIRATION RATE: 18 BRPM

## 2024-09-26 DIAGNOSIS — N87.1 CIN II (CERVICAL INTRAEPITHELIAL NEOPLASIA II): Primary | ICD-10-CM

## 2024-09-26 DIAGNOSIS — B37.9 YEAST INFECTION: ICD-10-CM

## 2024-09-26 PROCEDURE — 99213 OFFICE O/P EST LOW 20 MIN: CPT | Performed by: OBSTETRICS & GYNECOLOGY

## 2024-09-26 RX ORDER — FLUCONAZOLE 150 MG/1
150 TABLET ORAL ONCE
Qty: 1 TABLET | Refills: 0 | Status: SHIPPED | OUTPATIENT
Start: 2024-09-26 | End: 2024-09-26

## 2024-10-03 ENCOUNTER — TELEPHONE (OUTPATIENT)
Dept: OBGYN CLINIC | Facility: CLINIC | Age: 34
End: 2024-10-03

## 2024-10-03 NOTE — TELEPHONE ENCOUNTER
[facility] Surgical Case Review  Date Reviewed: 10/03/24  Reviewed by: Guillaume Rosario R Pisan  Case request: LEEP  Indication: RACHEL 2 at 3 o'clock position on colposcopy  Surgical Consent: signed 9/26/24  MA30/31: n/a    Case request approved: Yes    Comments:  None    Nikki Galaviz MD  PGY-2, OBGYN  10/03/24

## 2024-10-03 NOTE — TELEPHONE ENCOUNTER
----- Message from Nikki Galaviz MD sent at 10/3/2024  1:42 PM EDT -----  [facility] GYN Department  Surgery Scheduling Sheet    Patient Name: Estella Acosta  : 1990    Provider: Nikki Galaviz MD     Needed: no; Language: N/A    Procedure: exam under anesthesia and loop electrosurgical excision procedure    Diagnosis: RACHEL 2 on colposcopy    Special Needs or Equipment: none    Anesthesia: IV sedation with anesthesia    Length of stay: outpatient  Does patient have comorbid conditions that will require close perioperative monitoring prior to safe discharge: no    The patient has comorbid conditions that will require close perioperative monitoring prior to safe discharge, including N/A.   This may require acute care beyond the usual and routine recovery period. As such, inpatient admission post-operatively is expected and appropriate, and anticipated hospital length of stay will be >2 midnights.    Pre-Admission Testing Needed: no   Labs that should be ordered: NA    Order PAT that is recommended in prep for procedure?: Not Indicated    Medical Clearance Needed: no; Provider: N/A    MA Form Signed (tubals/hysterectomy): Not Indicated    Surgical Drink Given: no     How many days out of work: 5 day(s)     How many days no drivin day(s)       Is pre op appt needed?  yes  Interval for post op appt: 2 week(s)     For Surgical Scheduler:     Surgery Scheduled On:  [place]: [facility], [facility], [facility], and [facility]    Pre-op Appt:   Post op Appt:  Consult/Medical clearance appt:

## 2024-10-23 ENCOUNTER — DOCUMENTATION (OUTPATIENT)
Dept: OBGYN CLINIC | Facility: CLINIC | Age: 34
End: 2024-10-23

## 2024-10-31 ENCOUNTER — ANESTHESIA (OUTPATIENT)
Dept: ANESTHESIOLOGY | Facility: HOSPITAL | Age: 34
End: 2024-10-31

## 2024-10-31 ENCOUNTER — ANESTHESIA EVENT (OUTPATIENT)
Dept: ANESTHESIOLOGY | Facility: HOSPITAL | Age: 34
End: 2024-10-31

## 2024-11-05 ENCOUNTER — OFFICE VISIT (OUTPATIENT)
Dept: OBGYN CLINIC | Facility: CLINIC | Age: 34
End: 2024-11-05

## 2024-11-05 VITALS
BODY MASS INDEX: 32.13 KG/M2 | HEIGHT: 64 IN | HEART RATE: 92 BPM | DIASTOLIC BLOOD PRESSURE: 84 MMHG | RESPIRATION RATE: 18 BRPM | SYSTOLIC BLOOD PRESSURE: 126 MMHG | WEIGHT: 188.2 LBS

## 2024-11-05 DIAGNOSIS — N87.1 CIN II (CERVICAL INTRAEPITHELIAL NEOPLASIA II): Primary | ICD-10-CM

## 2024-11-05 PROCEDURE — 99213 OFFICE O/P EST LOW 20 MIN: CPT | Performed by: OBSTETRICS & GYNECOLOGY

## 2024-11-05 NOTE — PROGRESS NOTES
H&P Exam  Estella Acosta 34 y.o. female MRN: [id number]  Unit/Bed#:  Encounter: [id number]      HPI:  Estella Acosta is a 34 y.o.  female with history of RACHEL I/II on colposcopy who will be undergoing LEEP on 11/15/2024.    LMP: 10/11/2024  Contraception: Ststua post bilateral salpingectomy  Last pap smear: 2024, HSIL, HPV other pos, Colposcopy 2024  demonstrated ECC w/ Minute un-oriented fragments of dysplastic squamous epithelium  and 7' Oclock RACHEL 1, 3 O' clock  RACHEL II     Blood use during admission if needed: yes  CPR during admission if needed: yes    OB History    Para Term  AB Living   4 4 4     4   SAB IAB Ectopic Multiple Live Births           4      # Outcome Date GA Lbr Delgado/2nd Weight Sex Type Anes PTL Lv   4 Term            3 Term            2 Term            1 Term                Review of Systems   Constitutional:  Negative for chills and fever.   HENT:  Negative for ear pain and sore throat.    Eyes:  Negative for pain and visual disturbance.   Respiratory:  Negative for cough and shortness of breath.    Cardiovascular:  Negative for chest pain and palpitations.   Gastrointestinal:  Negative for abdominal pain and vomiting.   Genitourinary:  Negative for dysuria and hematuria.   Musculoskeletal:  Negative for arthralgias and back pain.   Skin:  Negative for color change and rash.   Neurological:  Negative for seizures and syncope.   All other systems reviewed and are negative.      Historical Information   Past Medical History:   Diagnosis Date    Anemia     Last assessed 4/10/2015    Class 1 obesity due to excess calories without serious comorbidity with body mass index (BMI) of 33.0 to 33.9 in adult 2014    Dyspareunia in female 2022    Encounter for annual routine gynecological examination 2019    Encounter for IUD removal 11/10/2022    Removed per pt request 11/10/22    Heart murmur     Hemorrhoids     Intermenstrual bleeding 2022  "   Irregular menses 11/1/2016    IUD (intrauterine device) in place 8/17/2022    Placed 7/22/2022 for heavy, irregular menses. Has tubal.     Menorrhagia with irregular cycle 4/6/2022    Menorrhagia with regular cycle 6/1/2018    Migraine     Obesity     Last assessed 12/30/2013    Possible exposure to STD 4/6/2022    Postcoital bleeding 4/20/2022    Screen for STD (sexually transmitted disease) 7/25/2019    Seasonal allergies      Past Surgical History:   Procedure Laterality Date    ABDOMINOPLASTY      MA HEMORRHOIDECTOMY INT & XTRNL 2/> COLUMN/SKYLAR N/A 3/10/2017    Procedure: HEMORRHOIDECTOMY ;  Surgeon: Gab Colon MD;  [facility];  Service: Colorectal    MA HYSTEROSCOPY BX ENDOMETRIUM&/POLYPC W/WO D&C N/A 7/22/2022    Procedure: DILATATION AND CURETTAGE (D&C) WITH HYSTEROSCOPY;  Surgeon: Christopher Barragan MD;  [facility];  Service: Gynecology    MA INSERTION INTRAUTERINE DEVICE IUD N/A 7/22/2022    Procedure: INSERTION OF INTRAUTERINE DEVICE (IUD);  Surgeon: Christopher Barragan MD;  [facility];  Service: Gynecology    TUBAL LIGATION       Social History   Social History     Substance and Sexual Activity   Alcohol Use Not Currently    Comment: socially  twice monthly     Social History     Substance and Sexual Activity   Drug Use Not Currently     Social History     Tobacco Use   Smoking Status Never   Smokeless Tobacco Never     Family History: denies history of breast, ovarian, uterine, cervical cancer.    Meds/Allergies    Not in a hospital admission.     Allergies   Allergen Reactions    Pollen Extract        OBJECTIVE:    Vitals: Blood pressure 126/84, pulse 92, resp. rate 18, height 5' 4\" (1.626 m), weight 85.4 kg (188 lb 3.2 oz), last menstrual period 10/11/2024, not currently breastfeeding.Body mass index is 32.3 kg/m².    Physical Exam  Constitutional:       Appearance: Normal appearance.   HENT:      Head: Normocephalic and atraumatic.   Eyes:      Extraocular Movements: " Extraocular movements intact.   Cardiovascular:      Rate and Rhythm: Normal rate.      Pulses: Normal pulses.   Pulmonary:      Effort: Pulmonary effort is normal.   Musculoskeletal:         General: Normal range of motion.      Cervical back: Normal range of motion.   Neurological:      General: No focal deficit present.      Mental Status: She is alert.   Psychiatric:         Mood and Affect: Mood normal.         Behavior: Behavior normal.         Assessment & Plan     - Patient will be taken to the OR for LEEP secondary to RACHEL I-II on colposcopy  - Pt previously consented for surgery; rediscussed surgery today including risks, benefits, alternatives. Pt had opportunity to ask questions, all of which were answered to her satisfaction. Confirmed consent form signed  - Discussed preoperative considerations, including Hibiclens wash (provided) the evening before and the morning of surgery, in the event that laparoscopy is required, as well as NPO after midnight on the day of her procedure  - Discussed anticipation of same-day procedure  - Discussed postoperative considerations, including pain management, recovery period, and 2w in-office follow up.   - Pt demonstrated understanding to all of the above. No additional questions or concerns were raised; pt is aware of how to reach us should questions/concerns rise  - Pt to present for surgery as scheduled  - Pt to return to outpatient office 2w postop        Krystyna Cedeno MD  11/5/2024  5:04 PM

## 2024-11-05 NOTE — H&P (VIEW-ONLY)
H&P Exam  Estella Acosta 34 y.o. female MRN: [id number]  Unit/Bed#:  Encounter: [id number]      HPI:  Estella Acosta is a 34 y.o.  female with history of RACHEL I/II on colposcopy who will be undergoing LEEP on 11/15/2024.    LMP: 10/11/2024  Contraception: Ststua post bilateral salpingectomy  Last pap smear: 2024, HSIL, HPV other pos, Colposcopy 2024  demonstrated ECC w/ Minute un-oriented fragments of dysplastic squamous epithelium  and 7' Oclock RACHEL 1, 3 O' clock  RACHEL II     Blood use during admission if needed: yes  CPR during admission if needed: yes    OB History    Para Term  AB Living   4 4 4     4   SAB IAB Ectopic Multiple Live Births           4      # Outcome Date GA Lbr Delgado/2nd Weight Sex Type Anes PTL Lv   4 Term            3 Term            2 Term            1 Term                Review of Systems   Constitutional:  Negative for chills and fever.   HENT:  Negative for ear pain and sore throat.    Eyes:  Negative for pain and visual disturbance.   Respiratory:  Negative for cough and shortness of breath.    Cardiovascular:  Negative for chest pain and palpitations.   Gastrointestinal:  Negative for abdominal pain and vomiting.   Genitourinary:  Negative for dysuria and hematuria.   Musculoskeletal:  Negative for arthralgias and back pain.   Skin:  Negative for color change and rash.   Neurological:  Negative for seizures and syncope.   All other systems reviewed and are negative.      Historical Information   Past Medical History:   Diagnosis Date    Anemia     Last assessed 4/10/2015    Class 1 obesity due to excess calories without serious comorbidity with body mass index (BMI) of 33.0 to 33.9 in adult 2014    Dyspareunia in female 2022    Encounter for annual routine gynecological examination 2019    Encounter for IUD removal 11/10/2022    Removed per pt request 11/10/22    Heart murmur     Hemorrhoids     Intermenstrual bleeding 2022  "   Irregular menses 11/1/2016    IUD (intrauterine device) in place 8/17/2022    Placed 7/22/2022 for heavy, irregular menses. Has tubal.     Menorrhagia with irregular cycle 4/6/2022    Menorrhagia with regular cycle 6/1/2018    Migraine     Obesity     Last assessed 12/30/2013    Possible exposure to STD 4/6/2022    Postcoital bleeding 4/20/2022    Screen for STD (sexually transmitted disease) 7/25/2019    Seasonal allergies      Past Surgical History:   Procedure Laterality Date    ABDOMINOPLASTY      NC HEMORRHOIDECTOMY INT & XTRNL 2/> COLUMN/SKYLAR N/A 3/10/2017    Procedure: HEMORRHOIDECTOMY ;  Surgeon: Gab Colon MD;  [facility];  Service: Colorectal    NC HYSTEROSCOPY BX ENDOMETRIUM&/POLYPC W/WO D&C N/A 7/22/2022    Procedure: DILATATION AND CURETTAGE (D&C) WITH HYSTEROSCOPY;  Surgeon: Christopher Barragan MD;  [facility];  Service: Gynecology    NC INSERTION INTRAUTERINE DEVICE IUD N/A 7/22/2022    Procedure: INSERTION OF INTRAUTERINE DEVICE (IUD);  Surgeon: Christopher Barragan MD;  [facility];  Service: Gynecology    TUBAL LIGATION       Social History   Social History     Substance and Sexual Activity   Alcohol Use Not Currently    Comment: socially  twice monthly     Social History     Substance and Sexual Activity   Drug Use Not Currently     Social History     Tobacco Use   Smoking Status Never   Smokeless Tobacco Never     Family History: denies history of breast, ovarian, uterine, cervical cancer.    Meds/Allergies    Not in a hospital admission.     Allergies   Allergen Reactions    Pollen Extract        OBJECTIVE:    Vitals: Blood pressure 126/84, pulse 92, resp. rate 18, height 5' 4\" (1.626 m), weight 85.4 kg (188 lb 3.2 oz), last menstrual period 10/11/2024, not currently breastfeeding.Body mass index is 32.3 kg/m².    Physical Exam  Constitutional:       Appearance: Normal appearance.   HENT:      Head: Normocephalic and atraumatic.   Eyes:      Extraocular Movements: " Extraocular movements intact.   Cardiovascular:      Rate and Rhythm: Normal rate.      Pulses: Normal pulses.   Pulmonary:      Effort: Pulmonary effort is normal.   Musculoskeletal:         General: Normal range of motion.      Cervical back: Normal range of motion.   Neurological:      General: No focal deficit present.      Mental Status: She is alert.   Psychiatric:         Mood and Affect: Mood normal.         Behavior: Behavior normal.         Assessment & Plan     - Patient will be taken to the OR for LEEP secondary to RACHEL I-II on colposcopy  - Pt previously consented for surgery; rediscussed surgery today including risks, benefits, alternatives. Pt had opportunity to ask questions, all of which were answered to her satisfaction. Confirmed consent form signed  - Discussed preoperative considerations, including Hibiclens wash (provided) the evening before and the morning of surgery, in the event that laparoscopy is required, as well as NPO after midnight on the day of her procedure  - Discussed anticipation of same-day procedure  - Discussed postoperative considerations, including pain management, recovery period, and 2w in-office follow up.   - Pt demonstrated understanding to all of the above. No additional questions or concerns were raised; pt is aware of how to reach us should questions/concerns rise  - Pt to present for surgery as scheduled  - Pt to return to outpatient office 2w postop        Krystyna Cedeno MD  11/5/2024  5:04 PM

## 2024-11-06 ENCOUNTER — ANESTHESIA EVENT (OUTPATIENT)
Dept: PERIOP | Facility: AMBULARY SURGERY CENTER | Age: 34
End: 2024-11-06
Payer: COMMERCIAL

## 2024-11-06 NOTE — PRE-PROCEDURE INSTRUCTIONS
Pre-Surgery Instructions:   Medication Instructions    buPROPion (WELLBUTRIN XL) 300 mg 24 hr tablet Take day of surgery.    norgestimate-ethinyl estradiol (Sprintec 28) 0.25-35 MG-MCG per tablet Take day of surgery.    topiramate (TOPAMAX) 25 mg tablet Take day of surgery.   Medication instructions for day surgery reviewed. Please use only a sip of water to take your instructed medications. Avoid all over the counter vitamins, supplements and NSAIDS for one week prior to surgery per anesthesia guidelines. Tylenol is ok to take as needed.     You will receive a call one business day prior to surgery with an arrival time and hospital directions. If your surgery is scheduled on a Monday, the hospital will be calling you on the Friday prior to your surgery. If you have not heard from anyone by 8pm, please call the hospital supervisor through the hospital  at [phone]. ([place] [phone] or [place] [phone]).    Do not eat or drink anything after midnight the night before your surgery, including candy, mints, lifesavers, or chewing gum. Do not drink alcohol 24hrs before your surgery. Try not to smoke at least 24hrs before your surgery.       Follow the pre surgery showering instructions as listed in the “My Surgical Experience Booklet” or otherwise provided by your surgeon's office. Do not use a blade to shave the surgical area 1 week before surgery. It is okay to use a clean electric clippers up to 24 hours before surgery. Do not apply any lotions, creams, including makeup, cologne, deodorant, or perfumes after showering on the day of your surgery. Do not use dry shampoo, hair spray, hair gel, or any type of hair products.     No contact lenses, eye make-up, or artificial eyelashes. Remove nail polish, including gel polish, and any artificial, gel, or acrylic nails if possible. Remove all jewelry including rings and body piercing jewelry.     Wear causal clothing that is easy to take on and off.  Consider your type of surgery.    Keep any valuables, jewelry, piercings at home. Please bring any specially ordered equipment (sling, braces) if indicated.    Arrange for a responsible person to drive you to and from the hospital on the day of your surgery. Please confirm the visitor policy for the day of your procedure when you receive your phone call with an arrival time.     Call the surgeon's office with any new illnesses, exposures, or additional questions prior to surgery.    Please reference your “My Surgical Experience Booklet” for additional information to prepare for your upcoming surgery.

## 2024-11-15 ENCOUNTER — ANESTHESIA (OUTPATIENT)
Dept: PERIOP | Facility: AMBULARY SURGERY CENTER | Age: 34
End: 2024-11-15
Payer: COMMERCIAL

## 2024-11-15 ENCOUNTER — HOSPITAL ENCOUNTER (OUTPATIENT)
Facility: AMBULARY SURGERY CENTER | Age: 34
Setting detail: OUTPATIENT SURGERY
Discharge: HOME | End: 2024-11-15
Attending: STUDENT IN AN ORGANIZED HEALTH CARE EDUCATION/TRAINING PROGRAM | Admitting: STUDENT IN AN ORGANIZED HEALTH CARE EDUCATION/TRAINING PROGRAM
Payer: COMMERCIAL

## 2024-11-15 VITALS
WEIGHT: 184 LBS | RESPIRATION RATE: 16 BRPM | DIASTOLIC BLOOD PRESSURE: 87 MMHG | OXYGEN SATURATION: 100 % | TEMPERATURE: 97.2 F | BODY MASS INDEX: 31.41 KG/M2 | HEART RATE: 75 BPM | HEIGHT: 64 IN | SYSTOLIC BLOOD PRESSURE: 132 MMHG

## 2024-11-15 DIAGNOSIS — B97.7 HPV (HUMAN PAPILLOMA VIRUS) INFECTION: ICD-10-CM

## 2024-11-15 DIAGNOSIS — R87.619 ABNORMAL CERVICAL PAPANICOLAOU SMEAR, UNSPECIFIED ABNORMAL PAP FINDING: ICD-10-CM

## 2024-11-15 PROBLEM — Z98.890 S/P LEEP (LOOP ELECTROSURGICAL EXCISION PROCEDURE): Status: ACTIVE | Noted: 2024-11-15

## 2024-11-15 LAB
EXT PREGNANCY TEST URINE: NEGATIVE
EXT. CONTROL: NORMAL

## 2024-11-15 PROCEDURE — 88305 TISSUE EXAM BY PATHOLOGIST: CPT | Performed by: PATHOLOGY

## 2024-11-15 PROCEDURE — 81025 URINE PREGNANCY TEST: CPT | Performed by: STUDENT IN AN ORGANIZED HEALTH CARE EDUCATION/TRAINING PROGRAM

## 2024-11-15 PROCEDURE — 88307 TISSUE EXAM BY PATHOLOGIST: CPT | Performed by: PATHOLOGY

## 2024-11-15 PROCEDURE — 57522 CONIZATION OF CERVIX: CPT | Performed by: STUDENT IN AN ORGANIZED HEALTH CARE EDUCATION/TRAINING PROGRAM

## 2024-11-15 RX ORDER — SODIUM CHLORIDE, SODIUM LACTATE, POTASSIUM CHLORIDE, CALCIUM CHLORIDE 600; 310; 30; 20 MG/100ML; MG/100ML; MG/100ML; MG/100ML
50 INJECTION, SOLUTION INTRAVENOUS CONTINUOUS
Status: CANCELLED | OUTPATIENT
Start: 2024-11-15

## 2024-11-15 RX ORDER — PROPOFOL 10 MG/ML
INJECTION, EMULSION INTRAVENOUS CONTINUOUS PRN
Status: DISCONTINUED | OUTPATIENT
Start: 2024-11-15 | End: 2024-11-15

## 2024-11-15 RX ORDER — MAGNESIUM HYDROXIDE 1200 MG/15ML
LIQUID ORAL AS NEEDED
Status: DISCONTINUED | OUTPATIENT
Start: 2024-11-15 | End: 2024-11-15 | Stop reason: HOSPADM

## 2024-11-15 RX ORDER — ACETAMINOPHEN 325 MG/1
975 TABLET ORAL EVERY 6 HOURS PRN
Status: DISCONTINUED | OUTPATIENT
Start: 2024-11-15 | End: 2024-11-15 | Stop reason: HOSPADM

## 2024-11-15 RX ORDER — ONDANSETRON 2 MG/ML
INJECTION INTRAMUSCULAR; INTRAVENOUS AS NEEDED
Status: DISCONTINUED | OUTPATIENT
Start: 2024-11-15 | End: 2024-11-15

## 2024-11-15 RX ORDER — PROPOFOL 10 MG/ML
INJECTION, EMULSION INTRAVENOUS AS NEEDED
Status: DISCONTINUED | OUTPATIENT
Start: 2024-11-15 | End: 2024-11-15

## 2024-11-15 RX ORDER — KETOROLAC TROMETHAMINE 30 MG/ML
INJECTION, SOLUTION INTRAMUSCULAR; INTRAVENOUS AS NEEDED
Status: DISCONTINUED | OUTPATIENT
Start: 2024-11-15 | End: 2024-11-15

## 2024-11-15 RX ORDER — LIDOCAINE HYDROCHLORIDE AND EPINEPHRINE 10; 10 MG/ML; UG/ML
INJECTION, SOLUTION INFILTRATION; PERINEURAL AS NEEDED
Status: DISCONTINUED | OUTPATIENT
Start: 2024-11-15 | End: 2024-11-15 | Stop reason: HOSPADM

## 2024-11-15 RX ORDER — ONDANSETRON 2 MG/ML
4 INJECTION INTRAMUSCULAR; INTRAVENOUS EVERY 6 HOURS PRN
Status: DISCONTINUED | OUTPATIENT
Start: 2024-11-15 | End: 2024-11-15 | Stop reason: HOSPADM

## 2024-11-15 RX ORDER — IBUPROFEN 600 MG/1
600 TABLET, FILM COATED ORAL EVERY 6 HOURS PRN
Status: DISCONTINUED | OUTPATIENT
Start: 2024-11-15 | End: 2024-11-15 | Stop reason: HOSPADM

## 2024-11-15 RX ORDER — MIDAZOLAM HYDROCHLORIDE 2 MG/2ML
INJECTION, SOLUTION INTRAMUSCULAR; INTRAVENOUS AS NEEDED
Status: DISCONTINUED | OUTPATIENT
Start: 2024-11-15 | End: 2024-11-15

## 2024-11-15 RX ORDER — DEXAMETHASONE SODIUM PHOSPHATE 4 MG/ML
INJECTION, SOLUTION INTRA-ARTICULAR; INTRALESIONAL; INTRAMUSCULAR; INTRAVENOUS; SOFT TISSUE AS NEEDED
Status: DISCONTINUED | OUTPATIENT
Start: 2024-11-15 | End: 2024-11-15

## 2024-11-15 RX ORDER — FENTANYL CITRATE/PF 50 MCG/ML
25 SYRINGE (ML) INJECTION
Status: DISCONTINUED | OUTPATIENT
Start: 2024-11-15 | End: 2024-11-15 | Stop reason: HOSPADM

## 2024-11-15 RX ORDER — FENTANYL CITRATE 50 UG/ML
INJECTION, SOLUTION INTRAMUSCULAR; INTRAVENOUS AS NEEDED
Status: DISCONTINUED | OUTPATIENT
Start: 2024-11-15 | End: 2024-11-15

## 2024-11-15 RX ORDER — SODIUM CHLORIDE, SODIUM LACTATE, POTASSIUM CHLORIDE, CALCIUM CHLORIDE 600; 310; 30; 20 MG/100ML; MG/100ML; MG/100ML; MG/100ML
INJECTION, SOLUTION INTRAVENOUS CONTINUOUS PRN
Status: DISCONTINUED | OUTPATIENT
Start: 2024-11-15 | End: 2024-11-15

## 2024-11-15 RX ORDER — LIDOCAINE HYDROCHLORIDE 10 MG/ML
INJECTION, SOLUTION EPIDURAL; INFILTRATION; INTRACAUDAL; PERINEURAL AS NEEDED
Status: DISCONTINUED | OUTPATIENT
Start: 2024-11-15 | End: 2024-11-15

## 2024-11-15 RX ORDER — ONDANSETRON 2 MG/ML
4 INJECTION INTRAMUSCULAR; INTRAVENOUS ONCE AS NEEDED
Status: DISCONTINUED | OUTPATIENT
Start: 2024-11-15 | End: 2024-11-15 | Stop reason: HOSPADM

## 2024-11-15 RX ADMIN — ONDANSETRON 4 MG: 2 INJECTION, SOLUTION INTRAMUSCULAR; INTRAVENOUS at 08:42

## 2024-11-15 RX ADMIN — LIDOCAINE HYDROCHLORIDE 50 MG: 10 INJECTION, SOLUTION EPIDURAL; INFILTRATION; INTRACAUDAL at 08:34

## 2024-11-15 RX ADMIN — SODIUM CHLORIDE, SODIUM LACTATE, POTASSIUM CHLORIDE, AND CALCIUM CHLORIDE: .6; .31; .03; .02 INJECTION, SOLUTION INTRAVENOUS at 08:08

## 2024-11-15 RX ADMIN — MIDAZOLAM 2 MG: 1 INJECTION INTRAMUSCULAR; INTRAVENOUS at 08:30

## 2024-11-15 RX ADMIN — PROPOFOL 100 MCG/KG/MIN: 10 INJECTION, EMULSION INTRAVENOUS at 08:34

## 2024-11-15 RX ADMIN — FENTANYL CITRATE 50 MCG: 50 INJECTION INTRAMUSCULAR; INTRAVENOUS at 08:34

## 2024-11-15 RX ADMIN — DEXAMETHASONE SODIUM PHOSPHATE 10 MG: 4 INJECTION INTRA-ARTICULAR; INTRALESIONAL; INTRAMUSCULAR; INTRAVENOUS; SOFT TISSUE at 08:42

## 2024-11-15 RX ADMIN — IBUPROFEN 600 MG: 600 TABLET, FILM COATED ORAL at 09:28

## 2024-11-15 RX ADMIN — FENTANYL CITRATE 25 MCG: 50 INJECTION INTRAMUSCULAR; INTRAVENOUS at 08:46

## 2024-11-15 RX ADMIN — KETOROLAC TROMETHAMINE 30 MG: 30 INJECTION, SOLUTION INTRAMUSCULAR; INTRAVENOUS at 08:43

## 2024-11-15 RX ADMIN — PROPOFOL 50 MG: 10 INJECTION, EMULSION INTRAVENOUS at 08:34

## 2024-11-15 NOTE — OP NOTE
OPERATIVE REPORT  PATIENT NAME: Estella Acosta    :  1990  MRN: [id number]  Pt Location: [facility] OR [facility] 01    SURGERY DATE: 11/15/2024    Surgeons and Role:     * Georgette Hernadze MD - Primary     * Roman Alanis MD - Assisting    Preop Diagnosis:  Abnormal cervical Papanicolaou smear, unspecified abnormal pap finding [R87.619]  HPV (human papilloma virus) infection [B97.7]    Post-Op Diagnosis Codes:     * Abnormal cervical Papanicolaou smear, unspecified abnormal pap finding [R87.619]     * HPV (human papilloma virus) infection [B97.7]    Procedure(s):  BIOPSY LEEP CERVIX  WITH ENDOCERVICAL CURETTTGE.   AND EXAM UNDER ANESTHESIA    Specimen(s):  ID Type Source Tests Collected by Time Destination   1 : CERVICAL LEEP- SUPERFICIAL Tissue Cervix TISSUE EXAM Georgette Hernadez MD 11/15/2024 0847    2 : CERVICAL LEEP- DEEP Tissue Cervix TISSUE EXAM Georgette Hernadez MD 11/15/2024 0848    3 : Endocervical curettings Tissue Endocervical TISSUE EXAM Georgette Hernadez MD 11/15/2024 0854        Estimated Blood Loss:   Minimal    Drains:  none    Anesthesia Type:   Choice    Operative Indications:  Abnormal cervical Papanicolaou smear, unspecified abnormal pap finding [R87.619]  HPV (human papilloma virus) infection [B97.7]    Complications:   None    Procedure and Technique:  Operative Findings:   External genitalia grossly normal in appearance.  No ulcerations, lacerations, or lesions.  Vagina and cervix were  grossly normal in appearance without any lacerations or lesions.    The patient was taken to the operating room.  IV sedation was administered and found to be adequate.  The patient was then positioned on the operating table in dorsolithotomy position with legs supported by stirrups and SCDs bilaterally.  All pressure points were padded.  Warm blanket was placed to maintain control of core body temperature.  The patient was then prepped and draped in usual sterile fashion.      Operative  Procedure:  A timeout was performed to confirm correct patient and correct procedure.  A bivalved coated speculum was inserted into the vagina and the cervix was visualized.  Local anesthesia of 1% with epinephrine was injected at 3 and 9 o'clock positions, 10 milliliters total.  The loop electrode was selected and used to excise the cervical sample using the [id number] loop.  The gross specimen was removed and sent to pathology.  Endocervical curettage was completed using the [facility] curette, placed on Telfa, and also sent to pathology.  Ball electrocautery was used to obtain adequate hemostasis.  Monsel's was then applied to maintain hemostasis.  Good hemostasis was confirmed.  The bivalve speculum was removed from the vagina.    At the completion of the procedure all needle, sponge, instrument counts were noted to be correct ×2.  Dr. Hernadez was present for all key portions of the procedure.       Patient Disposition:  PACU       SIGNATURE: Roman Alanis MD  DATE: November 15, 2024  TIME: 9:04 AM

## 2024-11-15 NOTE — INTERVAL H&P NOTE
H&P reviewed. After examining the patient I find no changes in the patients condition since the H&P had been written.    Vitals:    11/15/24 0739   BP: 137/86   Pulse: 90   Resp: 18   Temp: 97.5 °F (36.4 °C)   SpO2: 98%

## 2024-11-15 NOTE — ANESTHESIA POSTPROCEDURE EVALUATION
Post-Op Assessment Note    CV Status:  Stable    Pain management: adequate       Mental Status:  Alert and awake   Hydration Status:  Euvolemic   PONV Controlled:  Controlled   Airway Patency:  Patent     Post Op Vitals Reviewed: Yes    No anethesia notable event occurred.    Staff: Anesthesiologist           Last Filed PACU Vitals:  Vitals Value Taken Time   Temp 97 °F (36.1 °C) 11/15/24 0915   Pulse 86 11/15/24 0915   /66 11/15/24 0915   Resp 14 11/15/24 0915   SpO2 100 % 11/15/24 0915       Modified Carrie:  Activity: 2 (11/15/2024  9:47 AM)  Respiration: 2 (11/15/2024  9:47 AM)  Circulation: 2 (11/15/2024  9:47 AM)  Consciousness: 2 (11/15/2024  9:47 AM)  Oxygen Saturation: 2 (11/15/2024  9:47 AM)  Modified Carrie Score: 10 (11/15/2024  9:47 AM)

## 2024-11-15 NOTE — ANESTHESIA POSTPROCEDURE EVALUATION
Post-Op Assessment Note    CV Status:  Stable  Pain Score: 0    Pain management: adequate       Mental Status:  Arousable   PONV Controlled:  None   Airway Patency:  Patent     Post Op Vitals Reviewed: Yes    No anethesia notable event occurred.    Staff: Anesthesiologist, CRNA         Last Filed PACU Vitals:  Vitals Value Taken Time   Temp 97 °F (36.1 °C) 11/15/24 0900   Pulse 90 11/15/24 0905   /58 11/15/24 0902   Resp 13 11/15/24 0905   SpO2 98 % 11/15/24 0905   Vitals shown include unfiled device data.    Modified Carrie:  Activity: 2 (11/15/2024  9:00 AM)  Respiration: 2 (11/15/2024  9:00 AM)  Circulation: 2 (11/15/2024  9:00 AM)  Consciousness: 2 (11/15/2024  9:00 AM)  Oxygen Saturation: 2 (11/15/2024  9:00 AM)  Modified Carrie Score: 10 (11/15/2024  9:00 AM)

## 2024-11-15 NOTE — ANESTHESIA PREPROCEDURE EVALUATION
Procedure:  BIOPSY LEEP CERVIX  WITH ENDOCERVICAL CURETTTGE,   AND EXAM UNDER ANESTHESIA (Cervix)    Relevant Problems   MUSCULOSKELETAL   (+) Right sciatic nerve pain      NEURO/PSYCH   (+) Chronic daily headache   (+) Panic attacks        Physical Exam    Airway    Mallampati score: II  TM Distance: >3 FB  Neck ROM: full     Dental   No notable dental hx     Cardiovascular      Pulmonary      Other Findings  post-pubertal.      Anesthesia Plan  ASA Score- 2     Anesthesia Type- IV sedation with anesthesia with ASA Monitors.         Additional Monitors:     Airway Plan:            Plan Factors-Exercise tolerance (METS): >4 METS.    Chart reviewed.   Existing labs reviewed. Patient summary reviewed.                  Induction- intravenous.    Postoperative Plan-         Informed Consent- Anesthetic plan and risks discussed with patient.  I personally reviewed this patient with the CRNA. Discussed and agreed on the Anesthesia Plan with the CRNA..

## 2024-11-21 PROCEDURE — 88307 TISSUE EXAM BY PATHOLOGIST: CPT | Performed by: PATHOLOGY

## 2024-11-21 PROCEDURE — 88305 TISSUE EXAM BY PATHOLOGIST: CPT | Performed by: PATHOLOGY

## 2024-12-03 ENCOUNTER — APPOINTMENT (OUTPATIENT)
Dept: LAB | Age: 34
End: 2024-12-03
Payer: COMMERCIAL

## 2024-12-03 ENCOUNTER — OFFICE VISIT (OUTPATIENT)
Dept: OBGYN CLINIC | Facility: CLINIC | Age: 34
End: 2024-12-03

## 2024-12-03 VITALS
BODY MASS INDEX: 31.58 KG/M2 | HEART RATE: 80 BPM | DIASTOLIC BLOOD PRESSURE: 84 MMHG | SYSTOLIC BLOOD PRESSURE: 132 MMHG | WEIGHT: 184 LBS

## 2024-12-03 DIAGNOSIS — Z98.890 S/P LEEP (LOOP ELECTROSURGICAL EXCISION PROCEDURE): Primary | ICD-10-CM

## 2024-12-03 DIAGNOSIS — N76.0 BACTERIAL VAGINOSIS: ICD-10-CM

## 2024-12-03 DIAGNOSIS — B96.89 BACTERIAL VAGINOSIS: ICD-10-CM

## 2024-12-03 DIAGNOSIS — Z72.51 HIGH RISK HETEROSEXUAL BEHAVIOR: ICD-10-CM

## 2024-12-03 PROCEDURE — 36415 COLL VENOUS BLD VENIPUNCTURE: CPT

## 2024-12-03 PROCEDURE — 99213 OFFICE O/P EST LOW 20 MIN: CPT | Performed by: OBSTETRICS & GYNECOLOGY

## 2024-12-03 PROCEDURE — 87591 N.GONORRHOEAE DNA AMP PROB: CPT

## 2024-12-03 PROCEDURE — 87491 CHLMYD TRACH DNA AMP PROBE: CPT

## 2024-12-03 PROCEDURE — 86780 TREPONEMA PALLIDUM: CPT

## 2024-12-03 PROCEDURE — 87340 HEPATITIS B SURFACE AG IA: CPT

## 2024-12-03 PROCEDURE — 87389 HIV-1 AG W/HIV-1&-2 AB AG IA: CPT

## 2024-12-03 PROCEDURE — 86803 HEPATITIS C AB TEST: CPT

## 2024-12-03 RX ORDER — METRONIDAZOLE 500 MG/1
500 TABLET ORAL EVERY 12 HOURS SCHEDULED
Qty: 14 TABLET | Refills: 0 | Status: SHIPPED | OUTPATIENT
Start: 2024-12-03 | End: 2024-12-10

## 2024-12-03 NOTE — PROGRESS NOTES
OB/GYN VISIT  Estella Acosta  12/3/2024  10:48 AM    Subjective:     Estella Acosta is a 34 y.o.  female who presents for follow up of LEEP biopsy. Patient initially had pain with brown malodorous discharge which has resolved. Patient is anticipating that her period will resume and she normally has bacterial vaginosis when this happens. She feels the onset of discharge and would like a prescription for flagyl.    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 Patient states understanding.      Past Medical History:   Diagnosis Date    Anemia     Last assessed 4/10/2015    Anxiety     Class 1 obesity due to excess calories without serious comorbidity with body mass index (BMI) of 33.0 to 33.9 in adult 2014    Dyspareunia in female 2022    Encounter for annual routine gynecological examination 2019    Encounter for IUD removal 11/10/2022    Removed per pt request 11/10/22    Heart murmur     Hemorrhoids     Intermenstrual bleeding 2022    Irregular menses 2016    IUD (intrauterine device) in place 2022    Placed 2022 for heavy, irregular menses. Has tubal.     Menorrhagia with irregular cycle 2022    Menorrhagia with regular cycle 2018    Migraine     Obesity     Last assessed 2013    Possible exposure to STD 2022    Postcoital bleeding 2022    Screen for STD (sexually transmitted disease) 2019    Seasonal allergies      Past Surgical History:   Procedure Laterality Date    ABDOMINOPLASTY      [place] COLPOSCOPY CERVIX VAG LOOP ELTRD BX CERVIX N/A 11/15/2024    Procedure: BIOPSY LEEP CERVIX  WITH ENDOCERVICAL CURETTTGE,   AND EXAM UNDER ANESTHESIA;  Surgeon: Georgette Hernadez MD;  Location: [facility] MAIN OR;  Service: Gynecology    [place] HEMORRHOIDECTOMY INT & XTRNL 2/> COLUMN/SKYLAR N/A 03/10/2017    Procedure: HEMORRHOIDECTOMY ;  Surgeon: Gab Colon MD;  Location: [facility]  MAIN OR;  Service: Colorectal    [place] HYSTEROSCOPY BX ENDOMETRIUM&/POLYPC W/WO D&C N/A 07/22/2022    Procedure: DILATATION AND CURETTAGE (D&C) WITH HYSTEROSCOPY;  Surgeon: Christopher Barragan MD;  Location[facility] MA[facility];  Service: Gynecology    MO INSERTION INTRAUTERINE DEVICE IUD N/A 07/22/2022    Procedure: INSERTION OF INTRAUTERINE DEVICE (IUD);  Surgeon: Christopher Barragan MD;  Location[facility];  Service: Gynecology    REMOVAL OF INTRAUTERINE DEVICE (IUD)      TUBAL LIGATION         Objective:    Vitals: Blood pressure 132/84, pulse 80, weight 83.5 kg (184 lb), last menstrual period 10/11/2024, not currently breastfeeding.Body mass index is 31.58 kg/m².    Physical Exam  GEN: The patient was alert and oriented x3, pleasant well-appearing female in no acute distress.   CV:  Regular rate   RESP:  Unlabored breathing  GI:  Soft, nontender, non-distended  MSK: bilateral lower extremities are nontender, no edema  : Normal appearing external female genitalia, normal appearing urethral meatus. On sterile speculum exam,  normal appearing vaginal epithelium, thin clear vaginal discharge, no bleeding, grossly normal appearing cervix.         Assessment/Plan:  Estella Acosta is a 33 y/o with history of LEEP and CIN2 with negative margins. She will return for 6 month repeat pap smear. Flagyl was prescribed for bacterial vaginosis infection. STI testing was collected by patient request.  Problem List Items Addressed This Visit       S/P LEEP (loop electrosurgical excision procedure) - Primary       D/w Dr. Essie Bang MD  12/3/2024  10:48 AM    Please note, all notes within the [employer] System are written in medical terminology for other doctors to read. If you have a question about something you see in your chart, please don't hesitate to ask about it at your next appointment. All test results are immediately available through [employer] as well, and I will review results at my earliest opportunity and contact you  with the appropriate urgency.

## 2024-12-04 LAB
HBV SURFACE AG SER QL: NORMAL
HCV AB SER QL: NORMAL
HIV 1+2 AB+HIV1 P24 AG SERPL QL IA: NORMAL
HIV 2 AB SERPL QL IA: NORMAL
HIV1 AB SERPL QL IA: NORMAL
HIV1 P24 AG SERPL QL IA: NORMAL
TREPONEMA PALLIDUM IGG+IGM AB [PRESENCE] IN SERUM OR PLASMA BY IMMUNOASSAY: NORMAL

## 2024-12-05 LAB
C TRACH DNA SPEC QL NAA+PROBE: POSITIVE
N GONORRHOEA DNA SPEC QL NAA+PROBE: NEGATIVE

## 2024-12-06 ENCOUNTER — TELEPHONE (OUTPATIENT)
Dept: LABOR AND DELIVERY | Facility: HOSPITAL | Age: 34
End: 2024-12-06

## 2024-12-06 DIAGNOSIS — A74.9 CHLAMYDIA: Primary | ICD-10-CM

## 2024-12-06 RX ORDER — DOXYCYCLINE 100 MG/1
100 TABLET ORAL 2 TIMES DAILY
Qty: 14 TABLET | Refills: 0 | Status: SHIPPED | OUTPATIENT
Start: 2024-12-06 | End: 2024-12-13

## 2024-12-06 NOTE — TELEPHONE ENCOUNTER
Discussed with patient new diagnosis of chlamydia. We discussed recommendation to begin doxycyline 100mg BID x 7 days. She is no longer sexually active with the person whom she believes gave her the infection, and does not desire partner treatment. We discussed safe sex practices.    She will RTO in 3 months for retesting, clinical pool contacted.    Evelin Bang  OBGYN PGY3

## 2024-12-25 ENCOUNTER — APPOINTMENT (EMERGENCY)
Dept: RADIOLOGY | Facility: HOSPITAL | Age: 34
End: 2024-12-25
Payer: COMMERCIAL

## 2024-12-25 ENCOUNTER — HOSPITAL ENCOUNTER (EMERGENCY)
Facility: HOSPITAL | Age: 34
Discharge: HOME | End: 2024-12-25
Attending: EMERGENCY MEDICINE
Payer: COMMERCIAL

## 2024-12-25 VITALS
HEART RATE: 77 BPM | DIASTOLIC BLOOD PRESSURE: 90 MMHG | RESPIRATION RATE: 18 BRPM | OXYGEN SATURATION: 99 % | SYSTOLIC BLOOD PRESSURE: 147 MMHG | TEMPERATURE: 97.8 F

## 2024-12-25 DIAGNOSIS — M25.571 ACUTE RIGHT ANKLE PAIN: Primary | ICD-10-CM

## 2024-12-25 DIAGNOSIS — M25.532 ACUTE PAIN OF LEFT WRIST: ICD-10-CM

## 2024-12-25 PROCEDURE — 96372 THER/PROPH/DIAG INJ SC/IM: CPT

## 2024-12-25 PROCEDURE — 73110 X-RAY EXAM OF WRIST: CPT

## 2024-12-25 PROCEDURE — 99284 EMERGENCY DEPT VISIT MOD MDM: CPT | Performed by: EMERGENCY MEDICINE

## 2024-12-25 PROCEDURE — 99283 EMERGENCY DEPT VISIT LOW MDM: CPT

## 2024-12-25 PROCEDURE — 73630 X-RAY EXAM OF FOOT: CPT

## 2024-12-25 PROCEDURE — 73610 X-RAY EXAM OF ANKLE: CPT

## 2024-12-25 RX ORDER — MORPHINE SULFATE 4 MG/ML
4 INJECTION, SOLUTION INTRAMUSCULAR; INTRAVENOUS ONCE
Status: COMPLETED | OUTPATIENT
Start: 2024-12-25 | End: 2024-12-25

## 2024-12-25 RX ORDER — MORPHINE SULFATE 4 MG/ML
4 INJECTION, SOLUTION INTRAMUSCULAR; INTRAVENOUS ONCE
Status: DISCONTINUED | OUTPATIENT
Start: 2024-12-25 | End: 2024-12-25

## 2024-12-25 RX ADMIN — MORPHINE SULFATE 4 MG: 4 INJECTION INTRAVENOUS at 18:33

## 2024-12-25 NOTE — ED ATTENDING ATTESTATION
12/25/2024  ILily MD, saw and evaluated the patient. I have discussed the patient with the resident/non-physician practitioner and agree with the resident's/non-physician practitioner's findings, Plan of Care, and MDM as documented in the resident's/non-physician practitioner's note, except where noted. All available labs and Radiology studies were reviewed.  I was present for key portions of any procedure(s) performed by the resident/non-physician practitioner and I was immediately available to provide assistance.       At this point I agree with the current assessment done in the Emergency Department.  I have conducted an independent evaluation of this patient a history and physical is as follows:    This is a 34-year-old female without any significant related past medical history presenting to the ED today for a fall.  Patient had a mechanical fall earlier today, did not hit her head, did not lose consciousness.  Patient, as a result of her fall, has left hand/wrist pain as well as right ankle pain.  Her left hand pain is mainly in the anatomical snuffbox, but radiates down into her distal radius and the lateral aspect.  Patient does not have any obvious deformity aside from some mild swelling.  No crepitus.  She has a positive clamp sign, pain in the snuffbox with axial loading.  She is neurovascularly intact in the rest of her hand, distal left thumb.  On examination of her right foot/ankle she has tenderness to palpation of the lateral and medial malleoli.  She has obvious soft tissue swelling.  She also has tenderness to palpation overlying the base of the fifth metatarsal.  No obvious crepitus.  No obvious broken skin or other significant deformity.  Dorsalis pedis, posterior tibial pulses intact.  Intact cap refill distally.  Intact sensation, strength and range of motion in her toes on the right foot.  Her differential diagnosis includes: Contusion versus scaphoid fracture versus ankle  "sprain versus fracture versus other.  X-ray showed no acute abnormality.  Patient was placed in a walking boot, thumb spica splint, will follow-up with Ortho as an outpatient.  The management plan was discussed in detail with the patient at bedside and all questions were answered. Strict ED return instructions were discussed at bedside. Prior to discharge, both verbal and written instructions were provided. We discussed the signs and symptoms that should prompt the patient to return to the ED. All questions were answered and the patient was comfortable with the plan of care and discharged home. The patient agrees to return to the Emergency Department for concerns and/or progression of illness.    Portions of the above record have been created with voice recognition software.  Occasional wrong word or \"sound alike\" substitutions may have occurred due to the inherent limitations of voice recognition software.  Read the chart carefully and recognize, using context, where substitutions may have occurred.    ED Course         Critical Care Time  Procedures      "

## 2024-12-25 NOTE — ED PROVIDER NOTES
Time reflects when diagnosis was documented in both MDM as applicable and the Disposition within this note       Time User Action Codes Description Comment    12/25/2024  6:14 PM Lily Villegas Add [M25.571] Acute right ankle pain     12/25/2024  6:15 PM Lily Villegas Add [M25.532] Acute pain of left wrist           ED Disposition       ED Disposition   Discharge    Condition   Stable    Date/Time   Wed Dec 25, 2024  6:14 PM    Comment   Estella Acosta discharge to home/self care.                   Assessment & Plan       Medical Decision Making  Patient presents with:  Right ankle and left wrist pain post mechanical fall.  No pertinent past medical history.  Denies head strike, blood thinner or anticoagulant use.  Patient had done a twisting motion and had fallen on her left side while landing on her left wrist.  Patient states the pain is 10 out of 10 and is nonradiating.  Patient has not taken anything for pain.  Patient is on oral birth control.     Patient seen and examined noted to have left snuffbox tenderness and tenderness over the navicular and Metatarsal head on right lower extremity, unable to bear weight on right lower extremity.      Differential diagnosis includes but is not limited to sprain, strain, fracture.      Imaging revealed: No acute osseous abnormalities, or fractures present.    Butted a thumb spica brace and cam boot and told to follow-up with orthopedic surgery.     Patient appears well, is nontoxic appearing, Estella Acosta expresses understanding and agrees with plan of care at this time.  In light of this patient would benefit from outpatient management.    Patient was rx'd as below       Amount and/or Complexity of Data Reviewed  Radiology: ordered.    Risk  Prescription drug management.             Medications   morphine injection 4 mg (4 mg Intramuscular Given 12/25/24 1833)       ED Risk Strat Scores                          SBIRT 22yo+      Flowsheet Row Most Recent Value    Initial Alcohol Screen: US AUDIT-C     1. How often do you have a drink containing alcohol? 0 Filed at: 12/25/2024 1846   2. How many drinks containing alcohol do you have on a typical day you are drinking?  0 Filed at: 12/25/2024 1846   3b. FEMALE Any Age, or MALE 65+: How often do you have 4 or more drinks on one occassion? 0 Filed at: 12/25/2024 1846   Audit-C Score 0 Filed at: 12/25/2024 1846   BERTIN: How many times in the past year have you...    Used an illegal drug or used a prescription medication for non-medical reasons? Never Filed at: 12/25/2024 1846                            History of Present Illness       Chief Complaint   Patient presents with    Ankle Pain     Pt reports turned right ankle on step also injured left wrist         Past Medical History:   Diagnosis Date    Anemia     Last assessed 4/10/2015    Anxiety     Class 1 obesity due to excess calories without serious comorbidity with body mass index (BMI) of 33.0 to 33.9 in adult 06/18/2014    Dyspareunia in female 04/06/2022    Encounter for annual routine gynecological examination 07/25/2019    Encounter for IUD removal 11/10/2022    Removed per pt request 11/10/22    Heart murmur     Hemorrhoids     Intermenstrual bleeding 05/19/2022    Irregular menses 11/01/2016    IUD (intrauterine device) in place 08/17/2022    Placed 7/22/2022 for heavy, irregular menses. Has tubal.     Menorrhagia with irregular cycle 04/06/2022    Menorrhagia with regular cycle 06/01/2018    Migraine     Obesity     Last assessed 12/30/2013    Possible exposure to STD 04/06/2022    Postcoital bleeding 04/20/2022    Screen for STD (sexually transmitted disease) 07/25/2019    Seasonal allergies       Past Surgical History:   Procedure Laterality Date    ABDOMINOPLASTY      NC COLPOSCOPY CERVIX VAG LOOP ELTRD BX CERVIX N/A 11/15/2024    Procedure: BIOPSY LEEP CERVIX  WITH ENDOCERVICAL CURETTTGE,   AND EXAM UNDER ANESTHESIA;  Surgeon: Georgette Hernadez MD;  Location:   [facility];  Service: Gynecology    CO HEMORRHOIDECTOMY INT & XTRNL 2/> COLUMN/SKYLAR N/A 03/10/2017    Procedure: HEMORRHOIDECTOMY ;  Surgeon: Gab Colon MD;  [facility]: [facility];  Service: Colorectal    CO HYSTEROSCOPY BX ENDOMETRIUM&/POLYPC W/WO D&C N/A 07/22/2022    Procedure: DILATATION AND CURETTAGE (D&C) WITH HYSTEROSCOPY;  Surgeon: Christopher Barragan MD;  [facility]: [facility];  Service: Gynecology    CO INSERTION INTRAUTERINE DEVICE IUD N/A 07/22/2022    Procedure: INSERTION OF INTRAUTERINE DEVICE (IUD);  Surgeon: Christopher Barragan MD;  [facility];  Service: Gynecology    REMOVAL OF INTRAUTERINE DEVICE (IUD)      TUBAL LIGATION        Family History   Problem Relation Age of Onset    Anemia Mother     Hypertension Mother     Diabetes Mother     No Known Problems Father     Thyroid disease Sister     No Known Problems Sister     Down syndrome Brother     No Known Problems Daughter     No Known Problems Daughter     No Known Problems Son     No Known Problems Son     Diabetes Maternal Grandmother     Alzheimer's disease Paternal Grandmother     Breast cancer Maternal Aunt     Colon cancer Neg Hx     Ovarian cancer Neg Hx       Social History     Tobacco Use    Smoking status: Never    Smokeless tobacco: Never   Vaping Use    Vaping status: Never Used   Substance Use Topics    Alcohol use: Yes     Comment: twice monthly    Drug use: Not Currently      E-Cigarette/Vaping    E-Cigarette Use Never User       E-Cigarette/Vaping Substances    Nicotine No     THC No     CBD No     Flavoring No     Other No     Unknown No       I have reviewed and agree with the history as documented.      Estella Acosta is a 34 y.o. female     They presented to the emergency department on December 27, 2024. Patient presents with:  Right ankle and left wrist pain post mechanical fall.  No pertinent past medical history.  Denies head strike, blood thinner or anticoagulant use.  Patient had done a twisting motion and  had fallen on her left side while landing on her left wrist.  Patient states the pain is 10 out of 10 and is nonradiating.  Patient has not taken anything for pain.  Patient is on oral birth control.  Patient denies any fever, chills, headache, lightheadedness, dizziness, chest pain, cough, nausea, vomiting, abdominal pain, diarrhea, constipation, dysuria, polyuria, hematuria, rash, or any other complaint at this time.            Review of Systems   Constitutional:  Negative for chills and fever.   HENT:  Negative for ear pain and sore throat.    Eyes:  Negative for pain and visual disturbance.   Respiratory:  Negative for cough and shortness of breath.    Cardiovascular:  Negative for chest pain and palpitations.   Gastrointestinal:  Negative for abdominal pain, constipation, diarrhea, nausea and vomiting.   Genitourinary:  Negative for dysuria and hematuria.   Musculoskeletal:  Negative for arthralgias and back pain.   Skin:  Negative for color change and rash.   Neurological:  Negative for seizures and syncope.   All other systems reviewed and are negative.          Objective       ED Triage Vitals   Temperature Pulse Blood Pressure Respirations SpO2 Patient Position - Orthostatic VS   12/25/24 1720 12/25/24 1720 12/25/24 1720 12/25/24 1720 12/25/24 1720 12/25/24 1720   97.8 °F (36.6 °C) 77 147/90 18 99 % Sitting      Temp Source Heart Rate Source BP Location FiO2 (%) Pain Score    12/25/24 1720 12/25/24 1720 12/25/24 1720 -- 12/25/24 1833    Oral Monitor Right arm  10 - Worst Possible Pain      Vitals      Date and Time Temp Pulse SpO2 Resp BP Pain Score FACES Pain Rating User   12/25/24 1905 -- -- -- -- -- 8 -- SE   12/25/24 1833 -- -- -- -- -- 10 - Worst Possible Pain -- SE   12/25/24 1720 97.8 °F (36.6 °C) 77 99 % 18 147/90 -- -- TS            Physical Exam  Vitals and nursing note reviewed. Exam conducted with a chaperone present.   Constitutional:       General: She is not in acute distress.  HENT:       Head: Normocephalic and atraumatic.      Right Ear: Tympanic membrane and ear canal normal.      Left Ear: Tympanic membrane and ear canal normal.      Nose: Nose normal. No congestion or rhinorrhea.      Mouth/Throat:      Mouth: Mucous membranes are moist.   Eyes:      Pupils: Pupils are equal, round, and reactive to light.   Cardiovascular:      Rate and Rhythm: Normal rate.      Pulses: Normal pulses.      Heart sounds: Normal heart sounds. No murmur heard.     No friction rub. No gallop.   Pulmonary:      Effort: Pulmonary effort is normal. No respiratory distress.      Breath sounds: Normal breath sounds. No stridor. No wheezing, rhonchi or rales.   Abdominal:      Palpations: Abdomen is soft.      Tenderness: There is no abdominal tenderness.   Musculoskeletal:         General: No tenderness.      Right shoulder: Normal. No tenderness or bony tenderness.      Left shoulder: Normal. No tenderness or bony tenderness.      Right upper arm: No tenderness or bony tenderness.      Left upper arm: No tenderness or bony tenderness.      Right forearm: No tenderness or bony tenderness.      Left forearm: No tenderness or bony tenderness.      Right wrist: No tenderness or bony tenderness. Normal range of motion.      Left wrist: Bony tenderness and snuff box tenderness present. No tenderness. Decreased range of motion.      Right hand: No tenderness or bony tenderness. Normal range of motion.      Left hand: Bony tenderness present. No tenderness. Normal range of motion.      Cervical back: No tenderness.      Thoracic back: No tenderness or bony tenderness. Normal range of motion.      Lumbar back: No tenderness or bony tenderness. Normal range of motion.      Right hip: No tenderness or bony tenderness.      Left hip: No tenderness or bony tenderness.      Right upper leg: No tenderness or bony tenderness.      Left upper leg: No tenderness or bony tenderness.      Right knee: No bony tenderness. No tenderness.       Left knee: No bony tenderness. No tenderness.      Right lower leg: No tenderness or bony tenderness.      Left lower leg: No tenderness or bony tenderness.      Right ankle: No tenderness.      Left ankle: No tenderness.      Right foot: Bony tenderness (along navicular and head of 5th metatarsal) present. No tenderness.      Left foot: No tenderness or bony tenderness.      Comments: Patient unable to bear weight on right ankle.   Skin:     General: Skin is warm.      Capillary Refill: Capillary refill takes less than 2 seconds.   Neurological:      General: No focal deficit present.      Mental Status: She is alert and oriented to person, place, and time. Mental status is at baseline.         Results Reviewed       None            XR ankle 3+ views RIGHT   Final Interpretation by Shamar Mccabe MD (12/25 2130)      No acute osseous abnormality.         Computerized Assisted Algorithm (CAA) may have been used to analyze all applicable images.               Workstation performed: [id number]         XR foot 3+ views RIGHT   Final Interpretation by Shamar Mccabe MD (12/25 2133)      Cortical step-off involving the lateral aspect of the cuboid suspicious for avulsion fracture.         Computerized Assisted Algorithm (CAA) may have been used to analyze all applicable images.         Workstation performed: [id number]         XR wrist 3+ views LEFT   Final Interpretation by Shamar Mccabe MD (12/25 2135)      No acute osseous abnormality.         Computerized Assisted Algorithm (CAA) may have been used to analyze all applicable images.            Workstation performed: [id number]             Procedures    ED Medication and Procedure Management   Prior to Admission Medications   Prescriptions Last Dose Informant Patient Reported? Taking?   buPROPion (WELLBUTRIN XL) 300 mg 24 hr tablet  Self Yes No   Sig: Take 300 mg by mouth every morning      Facility-Administered Medications: None     Discharge Medication List as of 12/25/2024   7:47 PM        CONTINUE these medications which have NOT CHANGED    Details   buPROPion (WELLBUTRIN XL) 300 mg 24 hr tablet Take 300 mg by mouth every morning, Starting Fri 9/23/2022, Historical Med             ED SEPSIS DOCUMENTATION   Time reflects when diagnosis was documented in both MDM as applicable and the Disposition within this note       Time User Action Codes Description Comment    12/25/2024  6:14 PM Lily Villegas Add [M25.571] Acute right ankle pain     12/25/2024  6:15 PM Lily Villegas [M25.532] Acute pain of left wrist                  Chris Marie MD  12/27/24 2374

## 2024-12-25 NOTE — Clinical Note
Estella Acosta was seen and treated in our emergency department on 12/25/2024.                Diagnosis:     Estella  .    She may return on this date: 12/30/2024    Limited activity until cleared by orthopedic surgeons.      If you have any questions or concerns, please don't hesitate to call.      Chris Marie MD    ______________________________           _______________          _______________  Hospital Representative                              Date                                Time

## 2024-12-26 ENCOUNTER — RESULTS FOLLOW-UP (OUTPATIENT)
Dept: EMERGENCY DEPT | Facility: HOSPITAL | Age: 34
End: 2024-12-26

## 2024-12-26 NOTE — RESULT ENCOUNTER NOTE
Patient returned call to the emergency department.  Name and date of birth uses positive patient identifiers.  Made aware of test results.  Will continue to wear boot until follow-up with orthopedics in 4 days.

## 2025-01-06 ENCOUNTER — OFFICE VISIT (OUTPATIENT)
Dept: OBGYN CLINIC | Facility: CLINIC | Age: 35
End: 2025-01-06
Payer: COMMERCIAL

## 2025-01-06 VITALS — BODY MASS INDEX: 31.41 KG/M2 | HEIGHT: 64 IN | WEIGHT: 184 LBS

## 2025-01-06 DIAGNOSIS — M25.532 ACUTE PAIN OF LEFT WRIST: ICD-10-CM

## 2025-01-06 DIAGNOSIS — S93.409A SPRAIN OF ANKLE, INITIAL ENCOUNTER: ICD-10-CM

## 2025-01-06 DIAGNOSIS — M25.571 ACUTE RIGHT ANKLE PAIN: ICD-10-CM

## 2025-01-06 DIAGNOSIS — S92.214A CLOSED NONDISPLACED FRACTURE OF CUBOID OF RIGHT FOOT, INITIAL ENCOUNTER: Primary | ICD-10-CM

## 2025-01-06 PROCEDURE — 28450 TX TARSAL B1 FX W/O MNPJ EA: CPT | Performed by: ORTHOPAEDIC SURGERY

## 2025-01-06 PROCEDURE — 99244 OFF/OP CNSLTJ NEW/EST MOD 40: CPT | Performed by: ORTHOPAEDIC SURGERY

## 2025-01-06 NOTE — PROGRESS NOTES
Fracture / Dislocation Treatment    Date/Time: 1/6/2025 8:00 AM    Performed by: Randy Valentin MD  Authorized by: Randy Valentin MD    Patient Location:  Clinic    Injury location:  Foot  Location details:  Right foot  Injury type:  Fracture  Fracture type: cuboid    Neurovascular status: Neurovascularly intact    Distal perfusion: normal    Neurological function: normal    Range of motion: reduced    Manipulation performed?: No    Immobilization:  Brace  Neurovascular status: Neurovascularly intact    Distal perfusion: normal    Neurological function: normal    Range of motion: unchanged    Patient tolerance:  Patient tolerated the procedure well with no immediate complications       CHIEF COMPLAINT   Right foot/ankle injury    HISTORY OF PRESENT ILLNESS  Estella Acosta is a 34 y.o. female who presents for evaluation of their  right ankle. Patient fell on 12/25/24 with an inversion ankle injury. Patient presented to the ED where since obtained 4mg morphine injection, x-rays and a high tide cam boot. Today, she claims that she is in minimal pain. She does described increased pain upon palpation. 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    REVIEW OF SYSTEMS  Review of systems was performed and, outside that mentioned in the HPI, it was negative for symptomology related to the integumentary, hematologic, immunologic, allergic, neurologic, cardiovascular, respiratory, GI or  systems.    MEDICAL HISTORY  Patient Active Problem List   Diagnosis    Seasonal allergies    Other constipation    Chronic daily headache    Right sciatic nerve pain    Panic attacks    Snoring    Daytime somnolence    Nasal congestion    Acne vulgaris    Encounter for surveillance of contraceptive pills    History of tubal ligation    Bacterial vaginosis    RACHEL II (cervical intraepithelial neoplasia II)    S/P LEEP (loop electrosurgical excision procedure)        SURGICAL HISTORY  Past Surgical History:    Procedure Laterality Date    ABDOMINOPLASTY      MT COLPOSCOPY CERVIX VAG LOOP ELTRD BX CERVIX N/A 11/15/2024    Procedure: BIOPSY LEEP CERVIX  WITH ENDOCERVICAL CURETTTGE,   AND EXAM UNDER ANESTHESIA;  Surgeon: Georgette Hernadez MD;  [facility];  Service: Gynecology    MT HEMORRHOIDECTOMY INT & XTRNL 2/> COLUMN/SKYLAR N/A 03/10/2017    Procedure: HEMORRHOIDECTOMY ;  Surgeon: Gab Colon MD;  [facility];  Service: Colorectal    MT HYSTEROSCOPY BX ENDOMETRIUM&/POLYPC W/WO D&C N/A 07/22/2022    Procedure: DILATATION AND CURETTAGE (D&C) WITH HYSTEROSCOPY;  Surgeon: Christopher Barragan MD;  [facility];  Service: Gynecology    MT INSERTION INTRAUTERINE DEVICE IUD N/A 07/22/2022    Procedure: INSERTION OF INTRAUTERINE DEVICE (IUD);  Surgeon: Christopher Barragan MD;  [facility];  Service: Gynecology    REMOVAL OF INTRAUTERINE DEVICE (IUD)      TUBAL LIGATION         CURRENT MEDICATIONS    Current Outpatient Medications:     buPROPion (WELLBUTRIN XL) 300 mg 24 hr tablet, Take 300 mg by mouth every morning, Disp: , Rfl:     SOCIAL HISTORY  Social History     Socioeconomic History    Marital status: Single     Spouse name: Not on file    Number of children: 4    Years of education: Not on file    Highest education level: Not on file   Occupational History     Employer: [employer]   Tobacco Use    Smoking status: Never    Smokeless tobacco: Never   Vaping Use    Vaping status: Never Used   Substance and Sexual Activity    Alcohol use: Yes     Comment: twice monthly    Drug use: Not Currently    Sexual activity: Yes     Partners: Male     Birth control/protection: Female Sterilization   Other Topics Concern    Not on file   Social History Narrative    Exercises regularly     Social Drivers of Health     Financial Resource Strain: Low Risk  (8/17/2022)    Overall Financial Resource Strain (CARDIA)     Difficulty of Paying Living Expenses: Not hard at all   Food Insecurity: No Food  "Insecurity (8/17/2022)    Hunger Vital Sign     Worried About Running Out of Food in the Last Year: Never true     Ran Out of Food in the Last Year: Never true   Transportation Needs: No Transportation Needs (8/17/2022)    PRAPARE - Transportation     Lack of Transportation (Medical): No     Lack of Transportation (Non-Medical): No   Physical Activity: Insufficiently Active (7/28/2020)    Exercise Vital Sign     Days of Exercise per Week: 3 days     Minutes of Exercise per Session: 40 min   Stress: No Stress Concern Present (8/17/2022)    [employer] [place] [employer] - Occupational Stress Questionnaire     Feeling of Stress : Not at all   Social Connections: Socially Isolated (7/28/2020)    Social Connection and Isolation Panel [NHANES]     Frequency of Communication with Friends and Family: More than three times a week     Frequency of Social Gatherings with Friends and Family: More than three times a week     Attends Jewish Services: Never     Active Member of Clubs or Organizations: No     Attends Club or Organization Meetings: Never     Marital Status:    Intimate Partner Violence: Not At Risk (8/17/2022)    Humiliation, Afraid, Rape, and Kick questionnaire     Fear of Current or Ex-Partner: No     Emotionally Abused: No     Physically Abused: No     Sexually Abused: No   Housing Stability: Low Risk  (8/17/2022)    Housing Stability Vital Sign     Unable to Pay for Housing in the Last Year: No     Number of Places Lived in the Last Year: 1     Unstable Housing in the Last Year: No       Objective   VITAL SIGNS  Ht 5' 4\" (1.626 m) Comment: verbal  Wt 83.5 kg (184 lb) Comment: verbal  LMP 12/05/2024   BMI 31.58 kg/m²     PHYSICAL EXAMINATION  Musculoskeletal: Right Ankle   Skin Intact               Swelling/ecchymosis over Sinus Tarsi              TTP: ATF and Cuboid   Range of motion and strength testing limited due to pain   Sensation intact throughout Superficial peroneal, Deep " peroneal, Tibial, Sural, Saphenous distributions              EHL/TA/PF motor function intact to testing.               BCR              Gait: Antalgic     DIAGNOSTIC IMAGING    Independent interpretation of the right ankle x-ray from 12-25-24 demonstrate an closed avulsion fracture of the right cuboid.     Assessment & Plan      ATFL tear Right ankle  Right cuboid fracture      We had a lengthy discussion regarding the diagnosis, natural history, and treatment options of the patients cuboid fracture. We discussed nonoperative treatment options, and the patient elects to proceed with nonoperative management. We discussed standard of care for fracture treatment, goals and expectations. Treatment plan discussed with patient that includes immobilization in a CAM boot WBAT with crutches/walker for assistance. To wear boot for a total of 4-6 weeks. Return to clinic in 8 weeks for repeat xrays of the right ankle to evaluate fracture healing.     Discussed with the patient of coming out of the boot when pain subsides.    Work note provided for patient.    If any issues, questions, or concerns arise between now and the next appointment, we have encouraged the patient contact our team.    Patient voiced their understanding of this plan, and they were in agreement with it. All questions answered.      If any issues, questions, or concerns arise between now and the next appointment, we have encouraged the patient contact our team.      Scribe Attestation      I,:  Shan Doran am acting as a scribe while in the presence of the attending physician.:       I,:  Randy Valentin MD personally performed the services described in this documentation    as scribed in my presence.:

## 2025-01-06 NOTE — LETTER
January 7, 2025     [facility] Antonio Villegas MD  [address] [id number]    Patient: Estella Acosta   YOB: 1990   Date of Visit: 1/6/2025       Dear Dr. Villegas:    Thank you for referring Estella Acosta to me for evaluation. Below are my notes for this consultation.    If you have questions, please do not hesitate to call me. I look forward to following your patient along with you.         Sincerely,        Randy Valentin MD        CC: No Recipients    Randy Valentin MD  1/6/2025 12:07 PM  Signed    Fracture / Dislocation Treatment    Date/Time: 1/6/2025 8:00 AM    Performed by: Randy Valentin MD  Authorized by: Randy Valentin MD    Patient Location:  Clinic    Injury location:  Foot  Location details:  Right foot  Injury type:  Fracture  Fracture type: cuboid    Neurovascular status: Neurovascularly intact    Distal perfusion: normal    Neurological function: normal    Range of motion: reduced    Manipulation performed?: No    Immobilization:  Brace  Neurovascular status: Neurovascularly intact    Distal perfusion: normal    Neurological function: normal    Range of motion: unchanged    Patient tolerance:  Patient tolerated the procedure well with no immediate complications       CHIEF COMPLAINT   Right foot/ankle injury    HISTORY OF PRESENT ILLNESS  Estella Acosta is a 34 y.o. female who presents for evaluation of their  right ankle. Patient fell on 12/25/24 with an inversion ankle injury. Patient presented to the ED where since obtained 4mg morphine injection, x-rays and a high tide cam boot. Today, she claims that she is in minimal pain. She does described increased pain upon palpation. 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    REVIEW OF SYSTEMS  Review of systems was performed and, outside that mentioned in the HPI, it was negative for symptomology related to the integumentary, hematologic, immunologic, allergic, neurologic,  cardiovascular, respiratory, GI or  systems.    MEDICAL HISTORY  Patient Active Problem List   Diagnosis   • Seasonal allergies   • Other constipation   • Chronic daily headache   • Right sciatic nerve pain   • Panic attacks   • Snoring   • Daytime somnolence   • Nasal congestion   • Acne vulgaris   • Encounter for surveillance of contraceptive pills   • History of tubal ligation   • Bacterial vaginosis   • RACHEL II (cervical intraepithelial neoplasia II)   • S/P LEEP (loop electrosurgical excision procedure)        SURGICAL HISTORY  Past Surgical History:   Procedure Laterality Date   • ABDOMINOPLASTY     • MI COLPOSCOPY CERVIX VAG LOOP ELTRD BX CERVIX N/A 11/15/2024    Procedure: BIOPSY LEEP CERVIX  WITH ENDOCERVICAL CURETTTGE,   AND EXAM UNDER ANESTHESIA;  Surgeon: Georgette Hernadez MD;  [facility];  Service: Gynecology   • MI HEMORRHOIDECTOMY INT & XTRNL 2/> COLUMN/SKYLAR N/A 03/10/2017    Procedure: HEMORRHOIDECTOMY ;  Surgeon: Gab Colon MD;  [facility];  Service: Colorectal   • MI HYSTEROSCOPY BX ENDOMETRIUM&/POLYPC W/WO D&C N/A 07/22/2022    Procedure: DILATATION AND CURETTAGE (D&C) WITH HYSTEROSCOPY;  Surgeon: Christopher Barragan MD;  [facility];  Service: Gynecology   • MI INSERTION INTRAUTERINE DEVICE IUD N/A 07/22/2022    Procedure: INSERTION OF INTRAUTERINE DEVICE (IUD);  Surgeon: Christopher Barragan MD;  [facility];  Service: Gynecology   • REMOVAL OF INTRAUTERINE DEVICE (IUD)     • TUBAL LIGATION         CURRENT MEDICATIONS    Current Outpatient Medications:   •  buPROPion (WELLBUTRIN XL) 300 mg 24 hr tablet, Take 300 mg by mouth every morning, Disp: , Rfl:     SOCIAL HISTORY  Social History     Socioeconomic History   • Marital status: Single     Spouse name: Not on file   • Number of children: 4   • Years of education: Not on file   • Highest education level: Not on file   Occupational History     Employer: [employer]   Tobacco Use   • Smoking status: Never    • Smokeless tobacco: Never   Vaping Use   • Vaping status: Never Used   Substance and Sexual Activity   • Alcohol use: Yes     Comment: twice monthly   • Drug use: Not Currently   • Sexual activity: Yes     Partners: Male     Birth control/protection: Female Sterilization   Other Topics Concern   • Not on file   Social History Narrative    Exercises regularly     Social Drivers of Health     Financial Resource Strain: Low Risk  (8/17/2022)    Overall Financial Resource Strain (CARDIA)    • Difficulty of Paying Living Expenses: Not hard at all   Food Insecurity: No Food Insecurity (8/17/2022)    Hunger Vital Sign    • Worried About Running Out of Food in the Last Year: Never true    • Ran Out of Food in the Last Year: Never true   Transportation Needs: No Transportation Needs (8/17/2022)    PRAPARE - Transportation    • Lack of Transportation (Medical): No    • Lack of Transportation (Non-Medical): No   Physical Activity: Insufficiently Active (7/28/2020)    Exercise Vital Sign    • Days of Exercise per Week: 3 days    • Minutes of Exercise per Session: 40 min   Stress: No Stress Concern Present (8/17/2022)    [employer] [place] [employer] - Occupational Stress Questionnaire    • Feeling of Stress : Not at all   Social Connections: Socially Isolated (7/28/2020)    Social Connection and Isolation Panel [NHANES]    • Frequency of Communication with Friends and Family: More than three times a week    • Frequency of Social Gatherings with Friends and Family: More than three times a week    • Attends Mosque Services: Never    • Active Member of Clubs or Organizations: No    • Attends Club or Organization Meetings: Never    • Marital Status:    Intimate Partner Violence: Not At Risk (8/17/2022)    Humiliation, Afraid, Rape, and Kick questionnaire    • Fear of Current or Ex-Partner: No    • Emotionally Abused: No    • Physically Abused: No    • Sexually Abused: No   Housing Stability: Low Risk   "(8/17/2022)    Housing Stability Vital Sign    • Unable to Pay for Housing in the Last Year: No    • Number of Places Lived in the Last Year: 1    • Unstable Housing in the Last Year: No       Objective  VITAL SIGNS  Ht 5' 4\" (1.626 m) Comment: verbal  Wt 83.5 kg (184 lb) Comment: verbal  LMP 12/05/2024   BMI 31.58 kg/m²     PHYSICAL EXAMINATION  Musculoskeletal: Right Ankle   Skin Intact               Swelling/ecchymosis over Sinus Tarsi              TTP: ATF and Cuboid   Range of motion and strength testing limited due to pain   Sensation intact throughout Superficial peroneal, Deep peroneal, Tibial, Sural, Saphenous distributions              EHL/TA/PF motor function intact to testing.               BCR              Gait: Antalgic     DIAGNOSTIC IMAGING    Independent interpretation of the right ankle x-ray from 12-25-24 demonstrate an closed avulsion fracture of the right cuboid.     Assessment & Plan     ATFL tear Right ankle  Right cuboid fracture        We had a lengthy discussion regarding the diagnosis, natural history, and treatment options of the patients cuboid fracture. We discussed nonoperative treatment options, and the patient elects to proceed with nonoperative management. We discussed standard of care for fracture treatment, goals and expectations. Treatment plan discussed with patient that includes immobilization in a CAM boot WBAT with crutches/walker for assistance. To wear boot for a total of 4-6 weeks. Return to clinic in 8 weeks for repeat xrays of the right ankle to evaluate fracture healing.     Discussed with the patient of coming out of the boot when pain subsides.    Work note provided for patient.    If any issues, questions, or concerns arise between now and the next appointment, we have encouraged the patient contact our team.    Patient voiced their understanding of this plan, and they were in agreement with it. All questions answered.      If any issues, questions, or concerns " arise between now and the next appointment, we have encouraged the patient contact our team.      Scribe Attestation      I,:  Shan Doran am acting as a scribe while in the presence of the attending physician.:       I,:  Randy Valentin MD personally performed the services described in this documentation    as scribed in my presence.:

## 2025-01-06 NOTE — LETTER
January 6, 2025     Patient: Estella Acosta  YOB: 1990  Date of Visit: 1/6/2025      To Whom it May Concern:    Estella Acosta is under my professional care. Estella was seen in my office on 1/6/2025. Recommends light duty for the next 6-8 weeks. Please allow for frequent sitting breaks due to orthopedic injury.     If you have any questions or concerns, please don't hesitate to call.         Sincerely,          Randy Valentin MD        CC: No Recipients

## 2025-01-10 ENCOUNTER — TELEPHONE (OUTPATIENT)
Dept: OBGYN CLINIC | Facility: CLINIC | Age: 35
End: 2025-01-10

## 2025-01-10 NOTE — TELEPHONE ENCOUNTER
Patient stopped and requested urgent appointment to discuss not being light duty with her job and scheduled her for an appointment form 01/13/25 at 8:15am.

## 2025-01-13 ENCOUNTER — APPOINTMENT (OUTPATIENT)
Dept: RADIOLOGY | Age: 35
End: 2025-01-13
Payer: COMMERCIAL

## 2025-01-13 ENCOUNTER — OFFICE VISIT (OUTPATIENT)
Dept: OBGYN CLINIC | Facility: CLINIC | Age: 35
End: 2025-01-13

## 2025-01-13 VITALS — WEIGHT: 184 LBS | HEIGHT: 64 IN | BODY MASS INDEX: 31.41 KG/M2

## 2025-01-13 DIAGNOSIS — S92.214A CLOSED NONDISPLACED FRACTURE OF CUBOID OF RIGHT FOOT, INITIAL ENCOUNTER: Primary | ICD-10-CM

## 2025-01-13 DIAGNOSIS — S92.214A CLOSED NONDISPLACED FRACTURE OF CUBOID OF RIGHT FOOT, INITIAL ENCOUNTER: ICD-10-CM

## 2025-01-13 PROCEDURE — 73630 X-RAY EXAM OF FOOT: CPT

## 2025-01-13 PROCEDURE — 99024 POSTOP FOLLOW-UP VISIT: CPT | Performed by: ORTHOPAEDIC SURGERY

## 2025-01-13 NOTE — LETTER
January 13, 2025     Patient: Estella Acosta  YOB: 1990  Date of Visit: 1/13/2025      To Whom it May Concern:    Estella Acosta is under my professional care. Patient suffered an orthopedic injury on 12/26/24. She should be excused from work from 12/26/24 to 1/6/25 as she recovered from orthopedic injury. She may return to work 1/13/25 with no restrictions.      26-6th    If you have any questions or concerns, please don't hesitate to call.         Sincerely,          Randy Valentin MD        CC: No Recipients

## 2025-01-13 NOTE — PROGRESS NOTES
REASON FOR FOLLOW-UP  Estella Acosta is a 34 y.o. female who presents for follow-up of the right  foot    HISTORY OF PRESENT ILLNESS  Following our last visit, we decided to initially treat her  foot  symptoms non-operatively. Our plan was to manage their symptoms conservatively. Today, patient comes in for further evaluation. Patient said she has pain when walking.  She reports some swelling in ecchymosis on the lateral portion of her ankle.  She said she is not consistent with the CAM boot because she can't drive with it.      Musculoskeletal: Right Ankle   Skin Intact               Swelling/ecchymosis over lateral ankle              TTP: ATFL, cuboid   Range of motion and strength imited due to pain and stiffness   Sensation intact throughout Superficial peroneal, Deep peroneal, Tibial, Sural, Saphenous distributions              EHL/TA/PF motor function intact to testing.               BCR              Gait: Antalgic      DIAGNOSTIC IMAGING:  Independent interpretation of right foot x-rays show unchanged appearance of avulsion fracture of cuboid      IMPRESSION/REPORT/PLAN  ATFL tear Right ankle  Right cuboid fracture    Recommend cam boot for the following couple weeks and then to transition out of this  Recommended RICE and anti-inflammatories as needed  We will plan to see the patient back on an as-needed basis  Work note created for the patient today to allow her to go back to work with no restrictions per patient request  Patient is understanding and agreed above plan.  She is to call with any other questions or concerns.      Scribe Attestation      I,:  Aba Isaacs PA-C am acting as a scribe while in the presence of the attending physician.:       I,:  Randy Valentin MD personally performed the services described in this documentation    as scribed in my presence.:

## 2025-01-20 ENCOUNTER — OFFICE VISIT (OUTPATIENT)
Dept: OBGYN CLINIC | Facility: CLINIC | Age: 35
End: 2025-01-20

## 2025-01-20 VITALS — WEIGHT: 184 LBS | HEIGHT: 64 IN | BODY MASS INDEX: 31.41 KG/M2

## 2025-01-20 DIAGNOSIS — S92.214A CLOSED NONDISPLACED FRACTURE OF CUBOID OF RIGHT FOOT, INITIAL ENCOUNTER: Primary | ICD-10-CM

## 2025-01-20 PROCEDURE — 99024 POSTOP FOLLOW-UP VISIT: CPT | Performed by: ORTHOPAEDIC SURGERY

## 2025-01-20 RX ORDER — NORGESTIMATE AND ETHINYL ESTRADIOL 0.25-0.035
KIT ORAL
COMMUNITY
Start: 2025-01-18

## 2025-01-20 RX ORDER — TOPIRAMATE 25 MG/1
1 TABLET, FILM COATED ORAL 2 TIMES DAILY
COMMUNITY
Start: 2025-01-13

## 2025-01-20 NOTE — LETTER
January 20, 2025     Patient: Estella Acosta  YOB: 1990  Date of Visit: 1/20/2025      To Whom it May Concern:    Estella Acosta is under my professional care. Estella was seen in my office on 1/20/2025. Estella should be on light duty. She should avoid standing for more than 2 hours at a time without an hour break. She should avoid lifting heavier than 10 pounds. She should be allowed to use the CAM boot while at work. These restrictions should last 4-6 weeks.    If you have any questions or concerns, please don't hesitate to call.         Sincerely,          Randy Valentin MD        CC: No Recipients

## 2025-01-20 NOTE — PROGRESS NOTES
REASON FOR FOLLOW-UP  Estella Acosta is a 34 y.o. female who presents for follow-up of the right  foot    HISTORY OF PRESENT ILLNESS  Following our last visit, we decided to initially treat her  foot  symptoms non-operatively. Our plan was to manage their symptoms conservatively. Today, patient comes in for further evaluation. Patient said she tried to go back to work and she had a hard time. She after being on her feet for 2-3 hours she was limping.    PHYSICAL EXAM  Musculoskeletal: Right Ankle   Skin Intact               Swelling/ecchymosis over lateral ankle              TTP: ATFL, cuboid   Range of motion and strength imited due to pain and stiffness   Sensation intact throughout Superficial peroneal, Deep peroneal, Tibial, Sural, Saphenous distributions              EHL/TA/PF motor function intact to testing.               BCR              Gait: Antalgic      DIAGNOSTIC IMAGING:  Independent interpretation of right foot x-rays show unchanged appearance of avulsion fracture of cuboid      IMPRESSION/REPORT/PLAN  ATFL tear Right ankle  Right cuboid fracture DOI 12/25/25    Recommended RICE and anti-inflammatories as needed  It appears she is not fully healed from her injury as she continued to experience pain on her feet  Work note created excusing patient from work for the next several weeks  Recommended using the CAM boot as needed  Patient is understanding and agreeable to the above plan  She is to call with any other questions or concerns    Scribe Attestation      I,:  Aba Isaacs PA-C am acting as a scribe while in the presence of the attending physician.:       I,:  Randy Valentin MD personally performed the services described in this documentation    as scribed in my presence.:

## 2025-02-10 ENCOUNTER — OFFICE VISIT (OUTPATIENT)
Dept: OBGYN CLINIC | Facility: CLINIC | Age: 35
End: 2025-02-10

## 2025-02-10 VITALS — WEIGHT: 184 LBS | HEIGHT: 64 IN | BODY MASS INDEX: 31.41 KG/M2

## 2025-02-10 DIAGNOSIS — S92.214A CLOSED NONDISPLACED FRACTURE OF CUBOID OF RIGHT FOOT, INITIAL ENCOUNTER: Primary | ICD-10-CM

## 2025-02-10 PROCEDURE — 99024 POSTOP FOLLOW-UP VISIT: CPT | Performed by: ORTHOPAEDIC SURGERY

## 2025-02-10 NOTE — PROGRESS NOTES
REASON FOR FOLLOW-UP  Estella Acosta is a 34 y.o. female who presents for follow-up of the right  foot  cuboid fracture    HISTORY OF PRESENT ILLNESS  Following our last visit, we decided to initially treat her  right cuboid  symptoms non-operatively. Our plan was to manage their symptoms conservatively with Rest, Ice, Compression, Elevation, Activity Modification, Anti-inflammatory Medication, and HEP . Today, patient comes in for further evaluation. Patient is feeling better and would like to return to work      PHYSICAL EXAM  Musculoskeletal: Right Ankle            Skin Intact               Swelling/ecchymosis over lateral ankle              TTP: ATFL, cuboid            Range of motion and strength imited due to pain and stiffness            Sensation intact throughout Superficial peroneal, Deep peroneal, Tibial, Sural, Saphenous distributions              EHL/TA/PF motor function intact to testing.               BCR              Gait: Normal      DIAGNOSTIC IMAGING:  Procedure: XR foot 3+ vw right  Result Date: 1/13/2025  Narrative: RIGHT FOOT INDICATION:   Nondisplaced fracture of cuboid bone of right foot, initial encounter for closed fracture.  COMPARISON:  None. VIEWS:  XR FOOT 3+ VW RIGHT Images: 3 FINDINGS: Punctate ossific fragment adjacent to the proximal lateral aspect of the cuboid most consistent with a small avulsion fracture as per the history. No significant degenerative changes. No lytic or blastic osseous lesion. Soft tissues are unremarkable.     Impression: Proximal lateral cuboid avulsion fracture. Electronically signed: 01/13/2025 09:20 AM Jac Tan MPH,MD    Procedure: XR wrist 3+ views LEFT  Result Date: 12/25/2024  Narrative: XR WRIST 3+ VW LEFT INDICATION: pain post fall. COMPARISON: Left wrist radiographs 1/27/2016 FINDINGS: No acute fracture or dislocation. No significant degenerative changes. No lytic or blastic osseous lesion. Unremarkable soft tissues.     Impression:  No acute osseous abnormality. Computerized Assisted Algorithm (CAA) may have been used to analyze all applicable images. Workstation performed: [id number]     Procedure: XR foot 3+ views RIGHT  Result Date: 12/25/2024  Narrative: XR FOOT 3+ VW RIGHT INDICATION: pain post fall. COMPARISON: None FINDINGS: Cortical step-off involving the lateral aspect of the cuboid suspicious for avulsion fracture. No significant degenerative changes. No lytic or blastic osseous lesion. Soft tissue swelling about the lateral hindfoot/ankle.     Impression: Cortical step-off involving the lateral aspect of the cuboid suspicious for avulsion fracture. Computerized Assisted Algorithm (CAA) may have been used to analyze all applicable images. Workstation performed: [id number]     Procedure: XR ankle 3+ views RIGHT  Result Date: 12/25/2024  Narrative: XR ANKLE 3+ VW RIGHT INDICATION: pain post fall. COMPARISON: Ankle radiographs 6/29/2020 FINDINGS: No acute fracture or dislocation. No significant degenerative changes. No lytic or blastic osseous lesion. Unremarkable soft tissues.     Impression: No acute osseous abnormality. Computerized Assisted Algorithm (CAA) may have been used to analyze all applicable images. Workstation performed: [id number]         IMPRESSION/REPORT/PLAN  F/U right cuboid fracture  Discussed with the patient that she can return to work without restrictions  Recommends supportive shoe for when returning to work  F/U as needed. Please call with any questions or concerns          Scribe Attestation      I,:  Shan Doran am acting as a scribe while in the presence of the attending physician.:       I,:  Randy Valentin MD personally performed the services described in this documentation    as scribed in my presence.:

## 2025-02-10 NOTE — LETTER
February 10, 2025     Patient: Estella Acosta  YOB: 1990  Date of Visit: 2/10/2025      To Whom it May Concern:    Estella Acosta is under my professional care. Estella was seen in my office on 2/10/2025. Estella may return to work on 2/12/25 without any restrictions .    If you have any questions or concerns, please don't hesitate to call.         Sincerely,          Randy Valentin MD        CC: No Recipients

## 2025-03-09 NOTE — PROGRESS NOTES
Name: Estella Acosta      : 1990      MRN: [id number]  Encounter Provider: APURVA Escalera  Encounter Date: 3/10/2025   Encounter department: [facility]  :  Assessment & Plan  Chlamydia         Screening for STDs (sexually transmitted diseases)    Orders:    Chlamydia/GC amplified DNA by PCR      2025 repap patient aware of appointment  Annual scheduled for 25  History of Present Illness   HPI  Estella Acosta is a 34 y.o. female who presents for TIFFANY for + chlamydia 12/3/24.  She was treated.  denies any vaginal discharge or odor.  She denies any pelvic pain. She denies any concerns today.  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   Respiratory: Negative.     Cardiovascular: Negative.      Pertinent Medical History     Past Medical History:   Diagnosis Date    Anemia     Last assessed 4/10/2015    Anxiety     Class 1 obesity due to excess calories without serious comorbidity with body mass index (BMI) of 33.0 to 33.9 in adult 2014    Dyspareunia in female 2022    Encounter for annual routine gynecological examination 2019    Encounter for IUD removal 11/10/2022    Removed per pt request 11/10/22    Heart murmur     Hemorrhoids     Intermenstrual bleeding 2022    Irregular menses 2016    IUD (intrauterine device) in place 2022    Placed 2022 for heavy, irregular menses. Has tubal.     Menorrhagia with irregular cycle 2022    Menorrhagia with regular cycle 2018    Migraine     Obesity     Last assessed 2013    Possible exposure to STD 2022    Postcoital bleeding 2022    Screen for STD (sexually transmitted disease) 2019    Seasonal allergies         Past Surgical History:   Procedure Laterality Date    ABDOMINOPLASTY      DE  "COLPOSCOPY CERVIX VAG LOOP ELTRD BX CERVIX N/A 11/15/2024    Procedure: BIOPSY LEEP CERVIX  WITH ENDOCERVICAL CURETTTGE,   AND EXAM UNDER ANESTHESIA;  Surgeon: Georgette Hernadez MD;  [facility];  Service: Gynecology    GA HEMORRHOIDECTOMY INT & XTRNL 2/> COLUMN/SKYLAR N/A 03/10/2017    Procedure: HEMORRHOIDECTOMY ;  Surgeon: Gab Colon MD;  [facility];  Service: Colorectal    GA HYSTEROSCOPY BX ENDOMETRIUM&/POLYPC W/WO D&C N/A 07/22/2022    Procedure: DILATATION AND CURETTAGE (D&C) WITH HYSTEROSCOPY;  Surgeon: Christopher Barragan MD;  [facility];  Service: Gynecology    GA INSERTION INTRAUTERINE DEVICE IUD N/A 07/22/2022    Procedure: INSERTION OF INTRAUTERINE DEVICE (IUD);  Surgeon: Christopher Barragan MD;  [facility];  Service: Gynecology    REMOVAL OF INTRAUTERINE DEVICE (IUD)      TUBAL LIGATION               Objective   /94 (BP Location: Left arm, Patient Position: Sitting, Cuff Size: Adult)   Pulse 81   Ht 5' 4\" (1.626 m)   Wt 85.5 kg (188 lb 9.6 oz)   LMP 03/05/2025   BMI 32.37 kg/m²      Physical Exam  Constitutional:       Appearance: Normal appearance.   Cardiovascular:      Rate and Rhythm: Normal rate.   Pulmonary:      Effort: Pulmonary effort is normal.   Abdominal:      Palpations: Abdomen is soft.      Tenderness: There is no abdominal tenderness.     External genitalia-no lesions or erythema  Vagina-normal white discharge  Cervix-negative CMT no lesions, LEEP appearance  Uterus-anteverted, nontender  Adnexa-no masses nontender        "

## 2025-03-10 ENCOUNTER — OFFICE VISIT (OUTPATIENT)
Dept: OBGYN CLINIC | Facility: CLINIC | Age: 35
End: 2025-03-10

## 2025-03-10 VITALS
BODY MASS INDEX: 32.2 KG/M2 | HEIGHT: 64 IN | HEART RATE: 81 BPM | DIASTOLIC BLOOD PRESSURE: 94 MMHG | SYSTOLIC BLOOD PRESSURE: 135 MMHG | WEIGHT: 188.6 LBS

## 2025-03-10 DIAGNOSIS — A74.9 CHLAMYDIA: ICD-10-CM

## 2025-03-10 DIAGNOSIS — Z11.3 SCREENING FOR STDS (SEXUALLY TRANSMITTED DISEASES): Primary | ICD-10-CM

## 2025-03-10 PROCEDURE — 99213 OFFICE O/P EST LOW 20 MIN: CPT | Performed by: NURSE PRACTITIONER

## 2025-03-10 PROCEDURE — 87491 CHLMYD TRACH DNA AMP PROBE: CPT | Performed by: NURSE PRACTITIONER

## 2025-03-10 PROCEDURE — 87591 N.GONORRHOEAE DNA AMP PROB: CPT | Performed by: NURSE PRACTITIONER

## 2025-03-11 ENCOUNTER — RESULTS FOLLOW-UP (OUTPATIENT)
Dept: OBGYN CLINIC | Facility: CLINIC | Age: 35
End: 2025-03-11

## 2025-03-11 LAB
C TRACH DNA SPEC QL NAA+PROBE: NEGATIVE
N GONORRHOEA DNA SPEC QL NAA+PROBE: NEGATIVE

## 2025-06-02 ENCOUNTER — OFFICE VISIT (OUTPATIENT)
Dept: OBGYN CLINIC | Facility: CLINIC | Age: 35
End: 2025-06-02

## 2025-06-02 VITALS
HEIGHT: 64 IN | WEIGHT: 186.4 LBS | BODY MASS INDEX: 31.82 KG/M2 | HEART RATE: 80 BPM | SYSTOLIC BLOOD PRESSURE: 121 MMHG | DIASTOLIC BLOOD PRESSURE: 87 MMHG

## 2025-06-02 DIAGNOSIS — Z98.890 S/P LEEP (LOOP ELECTROSURGICAL EXCISION PROCEDURE): Primary | ICD-10-CM

## 2025-06-02 DIAGNOSIS — N87.1 CIN II (CERVICAL INTRAEPITHELIAL NEOPLASIA II): ICD-10-CM

## 2025-06-02 PROCEDURE — G0476 HPV COMBO ASSAY CA SCREEN: HCPCS | Performed by: NURSE PRACTITIONER

## 2025-06-02 PROCEDURE — G0145 SCR C/V CYTO,THINLAYER,RESCR: HCPCS | Performed by: NURSE PRACTITIONER

## 2025-06-02 NOTE — PROGRESS NOTES
Name: Estella Acosta      : 1990      MRN: [id number]  Encounter Provider: APURVA Escalera  Encounter Date: 2025   Encounter department: [facility]  :  Assessment & Plan  S/P LEEP (loop electrosurgical excision procedure)         RACHEL II (cervical intraepithelial neoplasia II)           Annual  due 25, 1 year out from LEEP  History of Present Illness   HPI  Estella Acosta is a 35 y.o. female who presents for pap today, she is scheduled for her annual 25, will change to 25  She has hx of LEEP 11/15/24 showed HSL CIN2 with negative margins and due for 6 month f/u  She has hx of + chlamydia 12/3/24 and had negative TIFFANY 3/10/25.  She had negative STD blood work testing on 12/3/25.  She denies any concerns today.  LMP was 25  History obtained from: patient    Review of Systems   Constitutional:  Negative for chills and fever.   Respiratory: Negative.     Cardiovascular: Negative.    Genitourinary:  Negative for menstrual problem.     Pertinent Medical History       Past Medical History:   Diagnosis Date    Anemia     Last assessed 4/10/2015    Anxiety     Class 1 obesity due to excess calories without serious comorbidity with body mass index (BMI) of 33.0 to 33.9 in adult 2014    Dyspareunia in female 2022    Encounter for annual routine gynecological examination 2019    Encounter for IUD removal 11/10/2022    Removed per pt request 11/10/22    Heart murmur     Hemorrhoids     Intermenstrual bleeding 2022    Irregular menses 2016    IUD (intrauterine device) in place 2022    Placed 2022 for heavy, irregular menses. Has tubal.     Menorrhagia with irregular cycle 2022    Menorrhagia with regular cycle 2018    Migraine     Obesity     Last assessed 2013    Possible exposure to STD 2022    Postcoital bleeding 2022    Screen for STD (sexually transmitted disease)  07/25/2019    Seasonal allergies         Past Surgical History:   Procedure Laterality Date    ABDOMINOPLASTY      NE COLPOSCOPY CERVIX VAG LOOP ELTRD BX CERVIX N/A 11/15/2024    Procedure: BIOPSY LEEP CERVIX  WITH ENDOCERVICAL CURETTTGE,   AND EXAM UNDER ANESTHESIA;  Surgeon: Georgette Hernadez MD;  [facility];  Service: Gynecology    NE HEMORRHOIDECTOMY INT & XTRNL 2/> COLUMN/SKYLAR N/A 03/10/2017    Procedure: HEMORRHOIDECTOMY ;  Surgeon: Gab Colon MD;  Location: [facility];  Service: Colorectal    NE HYSTEROSCOPY BX ENDOMETRIUM&/POLYPC W/WO D&C N/A 07/22/2022    Procedure: DILATATION AND CURETTAGE (D&C) WITH HYSTEROSCOPY;  Surgeon: Christopher Barragan MD;  [facility];  Service: Gynecology    NE INSERTION INTRAUTERINE DEVICE IUD N/A 07/22/2022    Procedure: INSERTION OF INTRAUTERINE DEVICE (IUD);  Surgeon: Christopher Barragan MD;  Location[facility];  Service: Gynecology    REMOVAL OF INTRAUTERINE DEVICE (IUD)      TUBAL LIGATION                 Objective   There were no vitals taken for this visit.     Physical Exam  Constitutional:       Appearance: Normal appearance.     Cardiovascular:      Rate and Rhythm: Normal rate.   Pulmonary:      Effort: Pulmonary effort is normal.   Abdominal:      Palpations: Abdomen is soft.      Tenderness: There is no abdominal tenderness.     Skin:     General: Skin is warm and dry.     External genitalia-no lesions or erythema  Vagina-small amount normal white discharge  Cervix-negative CMT no lesions, LEEP appearance  Uterus-anteverted, nontender  Adnexa-no masses nontender

## 2025-06-03 LAB
HPV HR 12 DNA CVX QL NAA+PROBE: NEGATIVE
HPV16 DNA CVX QL NAA+PROBE: NEGATIVE
HPV18 DNA CVX QL NAA+PROBE: NEGATIVE

## 2025-06-06 LAB
LAB AP GYN PRIMARY INTERPRETATION: NORMAL
Lab: NORMAL

## 2025-06-10 ENCOUNTER — RESULTS FOLLOW-UP (OUTPATIENT)
Dept: OBGYN CLINIC | Facility: CLINIC | Age: 35
End: 2025-06-10

## 2025-07-21 ENCOUNTER — OFFICE VISIT (OUTPATIENT)
Dept: URGENT CARE | Age: 35
End: 2025-07-21
Payer: COMMERCIAL

## 2025-07-21 VITALS
OXYGEN SATURATION: 99 % | TEMPERATURE: 98 F | SYSTOLIC BLOOD PRESSURE: 117 MMHG | HEART RATE: 84 BPM | DIASTOLIC BLOOD PRESSURE: 73 MMHG | RESPIRATION RATE: 18 BRPM

## 2025-07-21 DIAGNOSIS — R21 RASH: Primary | ICD-10-CM

## 2025-07-21 PROCEDURE — S9083 URGENT CARE CENTER GLOBAL: HCPCS | Performed by: NURSE PRACTITIONER

## 2025-07-21 PROCEDURE — G0382 LEV 3 HOSP TYPE B ED VISIT: HCPCS | Performed by: NURSE PRACTITIONER

## 2025-07-21 RX ORDER — TRIAMCINOLONE ACETONIDE 5 MG/G
CREAM TOPICAL 2 TIMES DAILY
Qty: 15 G | Refills: 0 | Status: SHIPPED | OUTPATIENT
Start: 2025-07-21

## 2025-07-21 NOTE — PATIENT INSTRUCTIONS
Apply Kenalog cream 2-3 times per day   Cool compresses to the area   You may use benadryl as needed   Follow up with your PCP    Patient Education     Skin Rash ED   General Information   You came to the Emergency Department (ED) for a skin rash. The medical name for this is dermatitis. Many things can cause your rash. You may have a rash if something is irritating your skin. An allergy can cause a rash and so can plants, soaps, and some kinds of metal. The doctors may not know what is causing your rash.  What care is needed at home?   Call your regular doctor to let them know you were in the ED. Make a follow-up appointment if you were told to.  Use an unscented cream or lotion to keep your skin moist.  Drink plenty of fluids to keep your body hydrated.  Bathe with cool or warm water. Do not use hot water. Pat yourself dry with a clean, thick, soft towel. Use mild and unscented soap, moisturizers, and deodorants.  At-home care to help with scratching:  Wear gloves to protect skin on your hands. Try wearing cotton gloves under plastic gloves. Remove both sets of gloves from time to time to prevent sweating.  Keep nails short and clean.  If you scratch in your sleep, wear white cotton gloves to bed.  Try using cool compresses on the skin. They may help with swelling and itching. Dip a cloth in cold water and put it right on your itchy skin.  When do I need to get emergency help?   Call for an ambulance right away if:   You start to have severe trouble breathing or swallowing (for example, you cannot speak in full sentences).  Return to the ED if:   The rash spreads over large parts of your body and most of your skin becomes red.  It is becoming hard to breathe, but you can still talk in full sentences.  When do I need to call the doctor?   You have a fever of 100.4°F (38°C) or higher or chills.  You have signs of a wound infection like swelling, redness, warmth, pain, or drainage from the wound.  You have new or  worsening symptoms.  Last Reviewed Date   2021-09-09  Consumer Information Use and Disclaimer   This generalized information is a limited summary of diagnosis, treatment, and/or medication information. It is not meant to be comprehensive and should be used as a tool to help the user understand and/or assess potential diagnostic and treatment options. It does NOT include all information about conditions, treatments, medications, side effects, or risks that may apply to a specific patient. It is not intended to be medical advice or a substitute for the medical advice, diagnosis, or treatment of a health care provider based on the health care provider's examination and assessment of a patient’s specific and unique circumstances. Patients must speak with a health care provider for complete information about their health, medical questions, and treatment options, including any risks or benefits regarding use of medications. This information does not endorse any treatments or medications as safe, effective, or approved for treating a specific patient. [employer] and its affiliates disclaim any warranty or liability relating to this information or the use thereof. The use of this information is governed by the Terms of Use, available at https://www.wolterskluwer.com/en/know/clinical-effectiveness-terms   Copyright   Copyright © 2024 [employer] and its affiliates and/or licensors. All rights reserved.

## 2025-07-21 NOTE — PROGRESS NOTES
[facility]  Name: Estella Acosta      : 1990      MRN: [id number]  Encounter Provider: APURVA Farr  Encounter Date: 2025   Encounter department: [facility] [place]  :  Assessment & Plan  Rash    Orders:  •  triamcinolone (KENALOG) 0.5 % cream; Apply topically 2 (two) times a day        Patient Instructions  Apply Kenalog cream 2-3 times per day   Cool compresses to the area   You may use benadryl as needed   Follow up with your PCP  Follow up with PCP in 3-5 days.  Proceed to  ER if symptoms worsen.    If tests are performed, our office will contact you with results only if changes need to made to the care plan discussed with you at the visit. You can review your full results on [facility].    Chief Complaint:   Chief Complaint   Patient presents with   • Rash     Rash on right ankle that she noticed this morning. Rash burns and itches. No OTC medication/creams used.      History of Present Illness   Patient is a 35 year old female presenting with rash on the right lower leg that she noticed yesterday morning. The area it itchy. Denies pain, fever, or chills. Denies the use of any new medications or hygiene products. No OTC medications/treatments attempted.           Review of Systems   Constitutional:  Negative for activity change, chills and fever.   Respiratory:  Negative for cough.    Musculoskeletal:  Negative for arthralgias and joint swelling.   Skin:  Positive for rash.     Past Medical History   Past Medical History[1]  Past Surgical History[1]  Family History[1]  she reports that she has never smoked. She has never used smokeless tobacco. She reports current alcohol use. She reports that she does not currently use drugs.  Current Outpatient Medications   Medication Instructions   • Estephania 0.25-35 MG-MCG per tablet    • topiramate (TOPAMAX) 25 mg tablet 1 tablet, 2 times daily   • triamcinolone (KENALOG) 0.5 % cream Topical, 2 times daily  "  Allergies[1]     Objective   /73   Pulse 84   Temp 98 °F (36.7 °C)   Resp 18   SpO2 99%      Physical Exam  Vitals reviewed.   Constitutional:       General: She is awake. She is not in acute distress.     Appearance: Normal appearance.     Cardiovascular:      Rate and Rhythm: Normal rate.   Pulmonary:      Effort: Pulmonary effort is normal.     Skin:     General: Skin is warm and moist.      Findings: Rash present. Rash is macular.      Comments: Macular rash to right medial lower leg.      Neurological:      General: No focal deficit present.      Mental Status: She is alert and oriented to person, place, and time.     Psychiatric:         Behavior: Behavior is cooperative.         Portions of the record may have been created with voice recognition software.  Occasional wrong word or \"sound a like\" substitutions may have occurred due to the inherent limitations of voice recognition software.  Read the chart carefully and recognize, using context, where substitutions have occurred.         [1]  Past Medical History:  Diagnosis Date   • Anemia     Last assessed 4/10/2015   • Anxiety    • Class 1 obesity due to excess calories without serious comorbidity with body mass index (BMI) of 33.0 to 33.9 in adult 06/18/2014   • Dyspareunia in female 04/06/2022   • Encounter for annual routine gynecological examination 07/25/2019   • Encounter for IUD removal 11/10/2022    Removed per pt request 11/10/22   • Heart murmur    • Hemorrhoids    • Intermenstrual bleeding 05/19/2022   • Irregular menses 11/01/2016   • IUD (intrauterine device) in place 08/17/2022    Placed 7/22/2022 for heavy, irregular menses. Has tubal.    • Menorrhagia with irregular cycle 04/06/2022   • Menorrhagia with regular cycle 06/01/2018   • Migraine    • Obesity     Last assessed 12/30/2013   • Possible exposure to STD 04/06/2022   • Postcoital bleeding 04/20/2022   • Screen for STD (sexually transmitted disease) 07/25/2019   • Seasonal " allergies    [1]  Past Surgical History:  Procedure Laterality Date   • ABDOMINOPLASTY     • OH COLPOSCOPY CERVIX VAG LOOP ELTRD BX CERVIX N/A 11/15/2024    Procedure: BIOPSY LEEP CERVIX  WITH ENDOCERVICAL CURETTTGE,   AND EXAM UNDER ANESTHESIA;  Surgeon: Georgette Hernadez MD;  [facility];  Service: Gynecology   • OH HEMORRHOIDECTOMY INT & XTRNL 2/> COLUMN/SKYLAR N/A 03/10/2017    Procedure: HEMORRHOIDECTOMY ;  Surgeon: Gab Colon MD;  [facility];  Service: Colorectal   • OH HYSTEROSCOPY BX ENDOMETRIUM&/POLYPC W/WO D&C N/A 07/22/2022    Procedure: DILATATION AND CURETTAGE (D&C) WITH HYSTEROSCOPY;  Surgeon: Christopher Barragan MD;  [facility];  Service: Gynecology   • OH INSERTION INTRAUTERINE DEVICE IUD N/A 07/22/2022    Procedure: INSERTION OF INTRAUTERINE DEVICE (IUD);  Surgeon: Christopher Barragan MD;  [facility];  Service: Gynecology   • REMOVAL OF INTRAUTERINE DEVICE (IUD)     • TUBAL LIGATION     [1]  Family History  Problem Relation Name Age of Onset   • Anemia Mother     • Hypertension Mother     • Diabetes Mother     • No Known Problems Father     • Thyroid disease Sister     • No Known Problems Sister     • Down syndrome Brother     • No Known Problems Daughter     • No Known Problems Daughter     • No Known Problems Son     • No Known Problems Son     • Diabetes Maternal Grandmother     • Alzheimer's disease Paternal Grandmother     • Breast cancer Maternal Aunt     • Colon cancer Neg Hx     • Ovarian cancer Neg Hx     [1]  Allergies  Allergen Reactions   • Pollen Extract

## (undated) DEVICE — STERILE 8 INCH PROCTO SWAB: Brand: CARDINAL HEALTH

## (undated) DEVICE — SUT MONOCRYL 5-0 RB-1 27 IN Y213H

## (undated) DEVICE — GLOVE PI ULTRA TOUCH SZ.7.0

## (undated) DEVICE — BASIC SINGLE BASIN 2-LF: Brand: MEDLINE INDUSTRIES, INC.

## (undated) DEVICE — BETHLEHEM UNIVERSAL MINOR VAG: Brand: CARDINAL HEALTH

## (undated) DEVICE — ELECTRODE BALL E-Z CLEAN 2IN -0015

## (undated) DEVICE — REM POLYHESIVE ADULT PATIENT RETURN ELECTRODE: Brand: VALLEYLAB

## (undated) DEVICE — STRL ALLENTOWN HYSTEROSCOPY PK: Brand: CARDINAL HEALTH

## (undated) DEVICE — INTENDED FOR TISSUE SEPARATION, AND OTHER PROCEDURES THAT REQUIRE A SHARP SURGICAL BLADE TO PUNCTURE OR CUT.: Brand: BARD-PARKER SAFETY BLADES SIZE 15, STERILE

## (undated) DEVICE — LEEP LOOP ELECTRODE WIDE 20 X 15

## (undated) DEVICE — TUBING SUCTION 5MM X 12 FT

## (undated) DEVICE — PVC URETHRAL CATHETER: Brand: DOVER

## (undated) DEVICE — PAD GROUNDING DUAL ADULT

## (undated) DEVICE — LUBRICANT SURGILUBE TUBE 4 OZ  FLIP TOP

## (undated) DEVICE — GLOVE INDICATOR PI UNDERGLOVE SZ 6.5 BLUE

## (undated) DEVICE — STRL UNIVERSAL MINOR GENERAL: Brand: CARDINAL HEALTH

## (undated) DEVICE — SYRINGE 10ML LL

## (undated) DEVICE — SUT SILK 2-0 SH 30 IN K833H

## (undated) DEVICE — SPECIMEN CONTAINER STERILE PEEL PACK

## (undated) DEVICE — NEPTUNE E-SEP SMOKE EVACUATION PENCIL, COATED, 70MM BLADE, PUSH BUTTON SWITCH: Brand: NEPTUNE E-SEP

## (undated) DEVICE — UNDER BUTTOCKS DRAPE W/FLUID CONTROL POUCH: Brand: CONVERTORS

## (undated) DEVICE — SPONGE STICK WITH PVP-I: Brand: KENDALL

## (undated) DEVICE — POOLE SUCTION HANDLE: Brand: CARDINAL HEALTH

## (undated) DEVICE — NEEDLE 25G X 1 1/2

## (undated) DEVICE — 1820 FOAM BLOCK NEEDLE COUNTER: Brand: DEVON

## (undated) DEVICE — GLOVE INDICATOR PI UNDERGLOVE SZ 7.5 BLUE

## (undated) DEVICE — SPONGE 4 X 4 XRAY 16 PLY STRL LF RFD

## (undated) DEVICE — NEEDLE SPINAL18G X 3.5 IN QUINCKE

## (undated) DEVICE — CYSTO TUBING SINGLE IRRIGATION

## (undated) DEVICE — GLOVE SRG BIOGEL 8

## (undated) DEVICE — SCD SEQUENTIAL COMPRESSION COMFORT SLEEVE MEDIUM KNEE LENGTH: Brand: KENDALL SCD